# Patient Record
Sex: MALE | Race: BLACK OR AFRICAN AMERICAN
[De-identification: names, ages, dates, MRNs, and addresses within clinical notes are randomized per-mention and may not be internally consistent; named-entity substitution may affect disease eponyms.]

---

## 2017-01-02 ENCOUNTER — HOSPITAL ENCOUNTER (INPATIENT)
Dept: HOSPITAL 72 - EMR | Age: 47
LOS: 23 days | Discharge: HOME | DRG: 710 | End: 2017-01-25
Payer: MEDICAID

## 2017-01-02 VITALS — DIASTOLIC BLOOD PRESSURE: 71 MMHG | SYSTOLIC BLOOD PRESSURE: 154 MMHG

## 2017-01-02 VITALS — WEIGHT: 200 LBS | BODY MASS INDEX: 26.51 KG/M2 | HEIGHT: 73 IN

## 2017-01-02 DIAGNOSIS — F11.90: ICD-10-CM

## 2017-01-02 DIAGNOSIS — F15.90: ICD-10-CM

## 2017-01-02 DIAGNOSIS — R65.20: ICD-10-CM

## 2017-01-02 DIAGNOSIS — N17.0: ICD-10-CM

## 2017-01-02 DIAGNOSIS — F12.90: ICD-10-CM

## 2017-01-02 DIAGNOSIS — Z72.89: ICD-10-CM

## 2017-01-02 DIAGNOSIS — L03.116: ICD-10-CM

## 2017-01-02 DIAGNOSIS — I27.2: ICD-10-CM

## 2017-01-02 DIAGNOSIS — N39.0: ICD-10-CM

## 2017-01-02 DIAGNOSIS — N18.9: ICD-10-CM

## 2017-01-02 DIAGNOSIS — I12.9: ICD-10-CM

## 2017-01-02 DIAGNOSIS — D64.9: ICD-10-CM

## 2017-01-02 DIAGNOSIS — Z59.0: ICD-10-CM

## 2017-01-02 DIAGNOSIS — E88.09: ICD-10-CM

## 2017-01-02 DIAGNOSIS — A41.9: Primary | ICD-10-CM

## 2017-01-02 LAB
ALBUMIN/GLOB SERPL: 1 {RATIO} (ref 1–2.7)
ALT SERPL-CCNC: 13 U/L (ref 3–41)
ANION GAP SERPL CALC-SCNC: 17 MMOL/L (ref 5–15)
AST SERPL-CCNC: 23 U/L (ref 5–40)
BASOPHILS NFR BLD MANUAL: 0 % (ref 0–2)
CALCIUM SERPL-MCNC: 8.5 MG/DL (ref 8.6–10.2)
CHLORIDE SERPL-SCNC: 91 MEQ/L (ref 98–107)
CO2 SERPL-SCNC: 23 MEQ/L (ref 20–30)
CREAT SERPL-MCNC: 1.2 MG/DL (ref 0.7–1.2)
EOSINOPHIL NFR BLD MANUAL: 0 % (ref 0–3)
ERYTHROCYTE [DISTWIDTH] IN BLOOD BY AUTOMATED COUNT: 11.7 % (ref 11.6–14.8)
GFR SERPLBLD BASED ON 1.73 SQ M-ARVRAT: > 60 ML/MIN (ref 60–?)
GLOBULIN SER-MCNC: 3.4 G/DL
HEMOLYSIS: 7
MCH RBC QN AUTO: 29.7 PG (ref 27–31)
MCHC RBC AUTO-ENTMCNC: 32 G/DL (ref 32–36)
MCV RBC AUTO: 93 FL (ref 80–99)
NEUTS BAND NFR BLD MANUAL: 0 % (ref 0–8)
NEUTS BAND NFR BLD MANUAL: 92 % (ref 45–75)
PLAT MORPH BLD: NORMAL
PLATELET # BLD EST: ADEQUATE 10*3/UL
PLATELET # BLD: 211 K/UL (ref 150–450)
PMV BLD AUTO: 5.3 FL (ref 6.5–10.1)
POTASSIUM SERPL-SCNC: 3.5 MEQ/L (ref 3.4–4.9)
PROT SERPL-MCNC: 6.8 G/DL (ref 6.6–8.7)
RBC # BLD AUTO: 4.21 M/UL (ref 4.7–6.1)
RBC MORPH BLD: NORMAL
SODIUM SERPL-SCNC: 131 MEQ/L (ref 135–145)
TOTAL CELLS COUNTED BLD: 100
TROPONIN I SERPL-MCNC: < 0.3 NG/ML (ref ?–0.3)
VARIANT LYMPHS NFR BLD MANUAL: 7 % (ref 20–45)
WBC # BLD AUTO: 21.8 K/UL (ref 4.8–10.8)

## 2017-01-02 PROCEDURE — 76775 US EXAM ABDO BACK WALL LIM: CPT

## 2017-01-02 PROCEDURE — 36589 REMOVAL TUNNELED CV CATH: CPT

## 2017-01-02 PROCEDURE — 80150 ASSAY OF AMIKACIN: CPT

## 2017-01-02 PROCEDURE — 85025 COMPLETE CBC W/AUTO DIFF WBC: CPT

## 2017-01-02 PROCEDURE — 84300 ASSAY OF URINE SODIUM: CPT

## 2017-01-02 PROCEDURE — 84481 FREE ASSAY (FT-3): CPT

## 2017-01-02 PROCEDURE — 86705 HEP B CORE ANTIBODY IGM: CPT

## 2017-01-02 PROCEDURE — 85730 THROMBOPLASTIN TIME PARTIAL: CPT

## 2017-01-02 PROCEDURE — 80061 LIPID PANEL: CPT

## 2017-01-02 PROCEDURE — 87340 HEPATITIS B SURFACE AG IA: CPT

## 2017-01-02 PROCEDURE — 84484 ASSAY OF TROPONIN QUANT: CPT

## 2017-01-02 PROCEDURE — 82550 ASSAY OF CK (CPK): CPT

## 2017-01-02 PROCEDURE — 82728 ASSAY OF FERRITIN: CPT

## 2017-01-02 PROCEDURE — 86709 HEPATITIS A IGM ANTIBODY: CPT

## 2017-01-02 PROCEDURE — 93970 EXTREMITY STUDY: CPT

## 2017-01-02 PROCEDURE — 86803 HEPATITIS C AB TEST: CPT

## 2017-01-02 PROCEDURE — 87040 BLOOD CULTURE FOR BACTERIA: CPT

## 2017-01-02 PROCEDURE — 36569 INSJ PICC 5 YR+ W/O IMAGING: CPT

## 2017-01-02 PROCEDURE — 71010: CPT

## 2017-01-02 PROCEDURE — 83880 ASSAY OF NATRIURETIC PEPTIDE: CPT

## 2017-01-02 PROCEDURE — 85007 BL SMEAR W/DIFF WBC COUNT: CPT

## 2017-01-02 PROCEDURE — 84443 ASSAY THYROID STIM HORMONE: CPT

## 2017-01-02 PROCEDURE — 81050 URINALYSIS VOLUME MEASURE: CPT

## 2017-01-02 PROCEDURE — 86140 C-REACTIVE PROTEIN: CPT

## 2017-01-02 PROCEDURE — 84550 ASSAY OF BLOOD/URIC ACID: CPT

## 2017-01-02 PROCEDURE — 84100 ASSAY OF PHOSPHORUS: CPT

## 2017-01-02 PROCEDURE — 83930 ASSAY OF BLOOD OSMOLALITY: CPT

## 2017-01-02 PROCEDURE — 83550 IRON BINDING TEST: CPT

## 2017-01-02 PROCEDURE — 89050 BODY FLUID CELL COUNT: CPT

## 2017-01-02 PROCEDURE — 82607 VITAMIN B-12: CPT

## 2017-01-02 PROCEDURE — 84439 ASSAY OF FREE THYROXINE: CPT

## 2017-01-02 PROCEDURE — 76000 FLUOROSCOPY <1 HR PHYS/QHP: CPT

## 2017-01-02 PROCEDURE — 81001 URINALYSIS AUTO W/SCOPE: CPT

## 2017-01-02 PROCEDURE — 82746 ASSAY OF FOLIC ACID SERUM: CPT

## 2017-01-02 PROCEDURE — 83605 ASSAY OF LACTIC ACID: CPT

## 2017-01-02 PROCEDURE — 75827 VEIN X-RAY CHEST: CPT

## 2017-01-02 PROCEDURE — 82533 TOTAL CORTISOL: CPT

## 2017-01-02 PROCEDURE — 83735 ASSAY OF MAGNESIUM: CPT

## 2017-01-02 PROCEDURE — 83540 ASSAY OF IRON: CPT

## 2017-01-02 PROCEDURE — 85610 PROTHROMBIN TIME: CPT

## 2017-01-02 PROCEDURE — 84156 ASSAY OF PROTEIN URINE: CPT

## 2017-01-02 PROCEDURE — 76937 US GUIDE VASCULAR ACCESS: CPT

## 2017-01-02 PROCEDURE — 83036 HEMOGLOBIN GLYCOSYLATED A1C: CPT

## 2017-01-02 PROCEDURE — 80053 COMPREHEN METABOLIC PANEL: CPT

## 2017-01-02 PROCEDURE — 82977 ASSAY OF GGT: CPT

## 2017-01-02 PROCEDURE — 94760 N-INVAS EAR/PLS OXIMETRY 1: CPT

## 2017-01-02 PROCEDURE — 87086 URINE CULTURE/COLONY COUNT: CPT

## 2017-01-02 PROCEDURE — 36415 COLL VENOUS BLD VENIPUNCTURE: CPT

## 2017-01-02 PROCEDURE — 93306 TTE W/DOPPLER COMPLETE: CPT

## 2017-01-02 PROCEDURE — 80202 ASSAY OF VANCOMYCIN: CPT

## 2017-01-02 PROCEDURE — 80300: CPT

## 2017-01-02 PROCEDURE — 94664 DEMO&/EVAL PT USE INHALER: CPT

## 2017-01-02 PROCEDURE — 80048 BASIC METABOLIC PNL TOTAL CA: CPT

## 2017-01-02 SDOH — ECONOMIC STABILITY - HOUSING INSECURITY: HOMELESSNESS: Z59.0

## 2017-01-02 NOTE — EMERGENCY ROOM REPORT
History of Present Illness


General


Chief Complaint:  Pain


Source:  Patient





Present Illness


HPI


Patient is a 46-year-old male who presented after increased left leg pain and 

swelling.  The patient had noted to have increased fever.  He had been having 

increased redness and swelling to his left leg.  A gradual onset of symptoms.  

Patient states that he thought he had cellulitis.  The patient had not been 

vomiting.  He reported having pain with movement.


Allergies:  


Coded Allergies:  


     No Known Allergies (Unverified , 1/2/17)





Patient History


Past Medical History:  see triage record


Reviewed Nursing Documentation:  PMH: Agreed, PSxH: Agreed





Nursing Documentation-PMH


Past Medical History:  No Stated History





Review of Systems


All Other Systems:  negative except mentioned in HPI





Physical Exam





Vital Signs








  Date Time  Temp Pulse Resp B/P Pulse Ox O2 Delivery O2 Flow Rate FiO2


 


1/2/17 20:52 102.7 76 18 154/71 98 Room Air  








Sp02 EP Interpretation:  reviewed, normal


General Appearance:  normal inspection, well appearing, no apparent distress, 

alert, GCS 15, Chronically Ill


Head:  atraumatic


ENT:  normal ENT inspection, hearing grossly normal, normal voice


Neck:  normal inspection, full range of motion, supple, no bony tend


Respiratory:  normal inspection, lungs clear, normal breath sounds, no 

respiratory distress, no retraction, no wheezing


Cardiovascular #1:  regular rate, rhythm, no edema


Gastrointestinal:  normal inspection, normal bowel sounds, non tender, soft, no 

guarding, no hernia


Genitourinary:  no CVA tenderness


Musculoskeletal:  normal inspection, back normal, normal range of motion


Neurologic:  normal inspection, alert, oriented x3, responsive, CNs III-XII nml 

as tested, speech normal


Psychiatric:  normal inspection, judgement/insight normal, mood/affect normal


Skin:  rash - erythema warmth, swelling to left leg consistent with cellulitis.

      Right Leg with swelling without evident infection





Medical Decision Making


Diagnostic Impression:  


 Primary Impression:  


 Cellulitis


ER Course


Patient presented for extremity pain. Differential diagnosis included but was 

not limited to fracture, contusion, vascular insufficiency, aortic aneurysm, 

cellulitis.Because of complexity of patient's case laboratory testing and 

imaging studies were ordered.


The patient was noted to have fever and tenderness to his left leg.  This is 

consistent with cellulitis.  Laboratory testing was notable for elevated white 

blood count.  The patient was given IV antibiotics.  The patient was discussed 

with Dr. peters for inpatient management





Labs








Test


  1/2/17


22:30


 


White Blood Count


  21.8 K/UL


(4.8-10.8)


 


Red Blood Count


  4.21 M/UL


(4.70-6.10)


 


Hemoglobin


  12.5 G/DL


(14.2-18.0)


 


Hematocrit


  39.1 %


(42.0-52.0)


 


Mean Corpuscular Volume 93 FL (80-99) 


 


Mean Corpuscular Hemoglobin


  29.7 PG


(27.0-31.0)


 


Mean Corpuscular Hemoglobin


Concent 32.0 G/DL


(32.0-36.0)


 


Red Cell Distribution Width


  11.7 %


(11.6-14.8)


 


Platelet Count


  211 K/UL


(150-450)


 


Mean Platelet Volume


  5.3 FL


(6.5-10.1)


 


Neutrophils (%) (Auto)  % (45.0-75.0) 


 


Lymphocytes (%) (Auto)  % (20.0-45.0) 


 


Monocytes (%) (Auto)  % (1.0-10.0) 


 


Eosinophils (%) (Auto)  % (0.0-3.0) 


 


Basophils (%) (Auto)  % (0.0-2.0) 


 


Lactic Acid Level


  1.60 mmol/L


(0.66-2.22)


 


Troponin I


  < 0.30 ng/mL


(<=0.30)











Last Vital Signs








  Date Time  Temp Pulse Resp B/P Pulse Ox O2 Delivery O2 Flow Rate FiO2


 


1/2/17 21:32 102.7 79 18 154/71 98 Room Air  








Status:  unchanged


Disposition:  ADMITTED AS INPATIENT


Condition:  Serious


Referrals:  


NOT CHOSEN [facility]/MD,REFERRING (PCP)











Fabian Romero Jan 2, 2017 23:44

## 2017-01-03 VITALS — DIASTOLIC BLOOD PRESSURE: 54 MMHG | SYSTOLIC BLOOD PRESSURE: 110 MMHG

## 2017-01-03 VITALS — SYSTOLIC BLOOD PRESSURE: 105 MMHG | DIASTOLIC BLOOD PRESSURE: 64 MMHG

## 2017-01-03 VITALS — SYSTOLIC BLOOD PRESSURE: 129 MMHG | DIASTOLIC BLOOD PRESSURE: 65 MMHG

## 2017-01-03 VITALS — SYSTOLIC BLOOD PRESSURE: 90 MMHG | DIASTOLIC BLOOD PRESSURE: 69 MMHG

## 2017-01-03 VITALS — SYSTOLIC BLOOD PRESSURE: 91 MMHG | DIASTOLIC BLOOD PRESSURE: 55 MMHG

## 2017-01-03 VITALS — DIASTOLIC BLOOD PRESSURE: 62 MMHG | SYSTOLIC BLOOD PRESSURE: 117 MMHG

## 2017-01-03 LAB
%HYPO/RBC NFR BLD AUTO: (no result) %
ALBUMIN/GLOB SERPL: 0.8 {RATIO} (ref 1–2.7)
ALT SERPL-CCNC: 11 U/L (ref 3–41)
ANION GAP SERPL CALC-SCNC: 17 MMOL/L (ref 5–15)
AST SERPL-CCNC: 23 U/L (ref 5–40)
BASOPHILS NFR BLD MANUAL: 0 % (ref 0–2)
CALCIUM SERPL-MCNC: 8.2 MG/DL (ref 8.6–10.2)
CHLORIDE SERPL-SCNC: 94 MEQ/L (ref 98–107)
CHOLEST SERPL-MCNC: 88 MG/DL (ref ?–200)
CHOLEST/HDLC SERPL: 1.8 {RATIO} (ref 3.3–4.4)
CO2 SERPL-SCNC: 23 MEQ/L (ref 20–30)
CREAT SERPL-MCNC: 1.4 MG/DL (ref 0.7–1.2)
EOSINOPHIL NFR BLD MANUAL: 0 % (ref 0–3)
ERYTHROCYTE [DISTWIDTH] IN BLOOD BY AUTOMATED COUNT: 11.8 % (ref 11.6–14.8)
GFR SERPLBLD BASED ON 1.73 SQ M-ARVRAT: > 60 ML/MIN (ref 60–?)
GLOBULIN SER-MCNC: 3.4 G/DL
HBA1C MFR BLD: 5.6 % (ref ?–6)
HEMOLYSIS: 39
LDLC SERPL ELPH-MCNC: 25 MG/DL (ref 60–99)
MCH RBC QN AUTO: 29.2 PG (ref 27–31)
MCHC RBC AUTO-ENTMCNC: 31.4 G/DL (ref 32–36)
MCV RBC AUTO: 93 FL (ref 80–99)
NEUTS BAND NFR BLD MANUAL: 3 % (ref 0–8)
NEUTS BAND NFR BLD MANUAL: 79 % (ref 45–75)
PLAT MORPH BLD: NORMAL
PLATELET # BLD EST: ADEQUATE 10*3/UL
PLATELET # BLD: 236 K/UL (ref 150–450)
PMV BLD AUTO: 5.6 FL (ref 6.5–10.1)
POTASSIUM SERPL-SCNC: 4.1 MEQ/L (ref 3.4–4.9)
PROT SERPL-MCNC: 6.2 G/DL (ref 6.6–8.7)
RBC # BLD AUTO: 4.46 M/UL (ref 4.7–6.1)
SODIUM SERPL-SCNC: 134 MEQ/L (ref 135–145)
TOTAL CELLS COUNTED BLD: 100
TSH SERPL-ACNC: 0.25 UIU/ML (ref 0.3–4.5)
VARIANT LYMPHS NFR BLD MANUAL: 13 % (ref 20–45)
WBC # BLD AUTO: 16.6 K/UL (ref 4.8–10.8)

## 2017-01-03 RX ADMIN — MORPHINE SULFATE PRN MG: 4 INJECTION INTRAVENOUS at 16:13

## 2017-01-03 RX ADMIN — HUMAN INSULIN SCH MLS/HR: 100 INJECTION, SOLUTION SUBCUTANEOUS at 18:37

## 2017-01-03 RX ADMIN — SODIUM CHLORIDE SCH MLS/HR: 9 INJECTION, SOLUTION INTRAVENOUS at 16:44

## 2017-01-03 RX ADMIN — HEPARIN SODIUM SCH UNITS: 5000 INJECTION INTRAVENOUS; SUBCUTANEOUS at 21:22

## 2017-01-03 RX ADMIN — HEPARIN SODIUM SCH UNITS: 5000 INJECTION INTRAVENOUS; SUBCUTANEOUS at 08:22

## 2017-01-03 NOTE — CONSULTATION
DATE OF CONSULTATION:



INFECTIOUS DISEASE CONSULTATION



CONSULTING PHYSICIAN:  Shailesh Bell M.D.



REFERRING PHYSICIAN:  Benjamin Salazar M.D.



REASON FOR CONSULTATION:  Left lower extremity cellulitis and

antibiotic management.



HISTORY OF PRESENT ILLNESS:  The patient is a 46-year-old male with

admitting prior past medical history of anemia who came to the hospital

with chief complaint of left lower extremity swelling and erythema.  In

the last few days that has worsened.  The patient is febrile.  Infectious

consultation requested for further evaluation of the patient and

antibiotic management.



PAST MEDICAL HISTORY:  Anemia.



MEDICATIONS:  Vancomycin and cefepime.



ALLERGIES:  No known drug allergies.



SOCIAL HISTORY:  The patient smokes cigarettes and drinks alcohol

occasionally.  The patient also occasionally uses __________.



FAMILY HISTORY:  Noncontributory.



PHYSICAL EXAMINATION:

VITAL SIGNS:  Temperature 100.4 degrees, pulse 86, respiratory rate

18, and blood pressure 105/64.

HEENT:  Mild pale conjunctivae.  No icterus.

NECK:  No lymphadenopathy.

CHEST:  Coarse breathing sounds.

HEART:  S1 and S2.

ABDOMEN:  Soft.

EXTREMITIES:  The patient has erythema over the left leg that is

tender.  The patient has left lower extremity edema.



LABORATORY AND DIAGNOSTIC DATA:  White blood cells 16.6, hemoglobin

13, and platelets 236,000.  BUN 15 and creatinine 1.4.  ALT, AST and

alkaline phosphatase within normal range.



ASSESSMENT AND PLAN:  The patient is a 46-year-old male with past

medical history significant for anemia, who came with left lower extremity

cellulitis most likely strep.  The patient is afebrile with __________ of

possibility of bacteremia.  The patient also has leukocytosis.  At this

time, the patient may benefit from broad-spectrum antibiotics.  We get

further information from cultures.



PLAN:

1. We will continue the patient on vancomycin and cefepime.

2. Monitor CBC.

3. Monitor BMP.

4. Monitor blood culture.

5. Monitor laboratories.

6. Based on the patient's clinical course, laboratories and cultures, we

will do further recommendation.



Thank you, Dr. Salazar, for allowing me to participate in the care of

this patient.  I will follow the patient with you during this

hospitalization









  ______________________________________________

  Shailesh Bell M.D.





DR:  Edwardo

D:  01/03/2017 11:47

T:  01/03/2017 12:44

JOB#:  [id number]

CC:

## 2017-01-03 NOTE — HISTORY AND PHYSICAL
History of Present Illness


General


Date patient seen:  Delbert 3, 2017


Reason for Hospitalization:  Pain





Present Illness


HPI


 46-year-old male who presented after increased left leg pain and swelling.  

The patient had noted to have fever.  He had been having increased redness and 

swelling to his left leg.  He was diagnosed to have extensive cellulitis and 

admitted for further evaluation.


Allergies:  


Coded Allergies:  


     No Known Allergies (Unverified , 1/2/17)





Patient History


Healthcare decision maker





Resuscitation status


Full Code


Advanced Directive on File


No





Past Medical/Surgical History


Past Medical/Surgical History:  


(1) Cellulitis





Review of Systems


All Other Systems:  negative except mentioned in HPI





Physical Exam


Lines, tubes and drains:  peripheral, central line


HEENT:  normocephalic, atraumatic


Neck:  non-tender, normal alignment


Respiratory/Chest:  chest wall non-tender, lungs clear


Cardiovascular/Chest:  normal peripheral pulses, normal rate


Abdomen:  normal bowel sounds, non tender


Genitourinary/Rectal:  normal genital exam, normal rectal exam





Last 24 Hour Vital Signs








  Date Time  Temp Pulse Resp B/P Pulse Ox O2 Delivery O2 Flow Rate FiO2


 


1/3/17 12:00 101.7 109 22 110/54 97 Room Air  


 


1/3/17 11:42 101.7       


 


1/3/17 10:38 100.4       


 


1/3/17 08:00 98.4 100 20 105/64 100 Room Air  


 


1/3/17 04:00 99.0 89 18 129/65 100 Room Air  


 


1/3/17 01:40 99.3 87 18 90/52 97 Room Air  


 


1/3/17 01:00  83 16 138/98 100 Room Air  


 


1/2/17 21:32 102.7 79 18 154/71 98 Room Air  


 


1/2/17 20:52 102.7 76 18 154/71 98 Room Air  

















Intake and Output  


 


 1/2/17 1/3/17





 18:59 06:59


 


Intake Total  400 ml


 


Balance  400 ml


 


  


 


Intake Oral  0 ml


 


IV Total  400 ml


 


# Voids  1











Laboratory Tests








Test


  1/2/17


22:30 1/3/17


05:25


 


White Blood Count


  21.8 K/UL


(4.8-10.8)  H 16.6 K/UL


(4.8-10.8)  H


 


Red Blood Count


  4.21 M/UL


(4.70-6.10)  L 4.46 M/UL


(4.70-6.10)  L


 


Hemoglobin


  12.5 G/DL


(14.2-18.0)  L 13.0 G/DL


(14.2-18.0)  L


 


Hematocrit


  39.1 %


(42.0-52.0)  L 41.4 %


(42.0-52.0)  L


 


Mean Corpuscular Volume 93 FL (80-99)   93 FL (80-99)  


 


Mean Corpuscular Hemoglobin


  29.7 PG


(27.0-31.0) 29.2 PG


(27.0-31.0)


 


Mean Corpuscular Hemoglobin


Concent 32.0 G/DL


(32.0-36.0) 31.4 G/DL


(32.0-36.0)  L


 


Red Cell Distribution Width


  11.7 %


(11.6-14.8) 11.8 %


(11.6-14.8)


 


Platelet Count


  211 K/UL


(150-450) 236 K/UL


(150-450)


 


Mean Platelet Volume


  5.3 FL


(6.5-10.1)  L 5.6 FL


(6.5-10.1)  L


 


Neutrophils (%) (Auto)


  % (45.0-75.0)


  % (45.0-75.0)


 


 


Lymphocytes (%) (Auto)


  % (20.0-45.0)


  % (20.0-45.0)


 


 


Monocytes (%) (Auto)  % (1.0-10.0)    % (1.0-10.0)  


 


Eosinophils (%) (Auto)  % (0.0-3.0)    % (0.0-3.0)  


 


Basophils (%) (Auto)  % (0.0-2.0)    % (0.0-2.0)  


 


Differential Total Cells


Counted 100  


  100  


 


 


Neutrophils % (Manual) 92 % (45-75)  H 79 % (45-75)  H


 


Lymphocytes % (Manual) 7 % (20-45)  L 13 % (20-45)  L


 


Monocytes % (Manual) 1 % (1-10)   5 % (1-10)  


 


Eosinophils % (Manual) 0 % (0-3)   0 % (0-3)  


 


Basophils % (Manual) 0 % (0-2)   0 % (0-2)  


 


Band Neutrophils 0 % (0-8)   3 % (0-8)  


 


Platelet Estimate Adequate   Adequate  


 


Platelet Morphology Normal   Normal  


 


Red Blood Cell Morphology Normal   


 


Sodium Level


  131 mEQ/L


(135-145)  L 134 mEQ/L


(135-145)  L


 


Potassium Level


  3.5 mEQ/L


(3.4-4.9) 4.1 mEQ/L


(3.4-4.9)


 


Chloride Level


  91 mEQ/L


()  L 94 mEQ/L


()  L


 


Carbon Dioxide Level


  23 mEQ/L


(20-30) 23 mEQ/L


(20-30)


 


Anion Gap 17 (5-15)  H 17 (5-15)  H


 


Blood Urea Nitrogen


  13 mg/dL


(7-23) 15 mg/dL


(7-23)


 


Creatinine


  1.2 mg/dL


(0.7-1.2) 1.4 mg/dL


(0.7-1.2)  H


 


Estimat Glomerular Filtration


Rate > 60 mL/min


(>60) > 60 mL/min


(>60)


 


Glucose Level


  114 mg/dL


()  H 108 mg/dL


()  H


 


Lactic Acid Level


  1.60 mmol/L


(0.66-2.22) 


 


 


Calcium Level


  8.5 mg/dL


(8.6-10.2)  L 8.2 mg/dL


(8.6-10.2)  L


 


Total Bilirubin


  0.6 mg/dL


(0.0-1.2) 0.7 mg/dL


(0.0-1.2)


 


Aspartate Amino Transf


(AST/SGOT) 23 U/L (5-40)  


  23 U/L (5-40)  


 


 


Alanine Aminotransferase


(ALT/SGPT) 13 U/L (3-41)  


  11 U/L (3-41)  


 


 


Alkaline Phosphatase


  58 U/L


() 56 U/L


()


 


Troponin I


  < 0.30 ng/mL


(<=0.30) 


 


 


Total Protein


  6.8 g/dL


(6.6-8.7) 6.2 g/dL


(6.6-8.7)  L


 


Albumin


  3.4 g/dL


(3.5-5.2)  L 2.8 g/dL


(3.5-5.2)  L


 


Globulin 3.4 g/dL   3.4 g/dL  


 


Albumin/Globulin Ratio


  1.0 (1.0-2.7)  


  0.8 (1.0-2.7)


L


 


Hypochromasia  1+  


 


Hemoglobin A1c  5.6 % (< 6.0)  


 


Triglycerides Level


  


  65 mg/dL (<


150)


 


Cholesterol Level


  


  88 mg/dL (<


200)


 


LDL Cholesterol


  


  25 mg/dL


(60-99)  L


 


HDL Cholesterol


  


  50 mg/dL (>


60)


 


Cholesterol/HDL Ratio


  


  1.8 (3.3-4.4)


L


 


Thyroid Stimulating Hormone


(TSH) 


  0.248 uIU/mL


(0.300-4.500)








Height (Feet):  6


Height (Inches):  1.00


Weight (Pounds):  200


Medications





Current Medications








 Medications


  (Trade)  Dose


 Ordered  Sig/Kathleen


 Route


 PRN Reason  Start Time


 Stop Time Status Last Admin


Dose Admin


 


 Acetaminophen


  (Tylenol)  650 mg  Q4H  PRN


 ORAL


 fever  1/2/17 23:45


 2/1/17 23:44  1/3/17 10:43


 


 


 Al Hydroxide/Mg


 Hydroxide


  (Mylanta II)  30 ml  Q6H  PRN


 ORAL


 dyspepsia  1/2/17 23:45


 2/1/17 23:44   


 


 


 Cefepime HCl 1 gm/


 Sodium Chloride  100 ml @ 


 200 mls/hr  EVERY 8  HOURS


 IVPB


   1/3/17 14:00


 1/10/17 13:59  1/3/17 13:23


 


 


 Dextrose


  (Dextrose 50%)    STAT  PRN


 IV


 Hypoglycemia  1/2/17 23:45


 2/1/17 23:44   


 


 


 Heparin Sodium


  (Porcine)


  (Heparin 5000


 units/ml)  5,000 units  EVERY 12  HOURS


 SUBQ


   1/3/17 09:00


 2/2/17 08:59  1/3/17 08:22


 


 


 Lorazepam


  (Ativan 2mg/ml


 1ml)  0.5 mg  Q4H  PRN


 IV


 For Anxiety  1/2/17 23:45


 1/9/17 23:44   


 


 


 Morphine Sulfate


  (Morphine


 Sulfate)  1 mg  EVERY 4 HOURS  PRN


 IVP


 For Pain  1/2/17 23:45


 1/9/17 23:44   


 


 


 Ondansetron HCl


  (Zofran)  4 mg  Q6H  PRN


 IVP


 Nausea & Vomiting  1/2/17 23:45


 2/1/17 23:44   


 


 


 Polyethylene


 Glycol


  (Miralax)  17 gm  HSPRN  PRN


 ORAL


 Constipation  1/2/17 23:45


 2/1/17 23:44   


 


 


 Vancomycin HCl 1


 ea  1 ea  DAILY  PRN


 MISC


 Per rx protocol  1/2/17 23:45


 2/1/17 23:44   


 


 


 Vancomycin HCl/


 Dextrose


  (Vancomycin/D5W


 250ml)  250 ml @ 


 167 mls/hr  Q12HR@0400,1600


 IVPB


   1/3/17 16:00


 1/8/17 15:59   


 


 


 Zolpidem Tartrate


  (Ambien)  5 mg  HSPRN  PRN


 ORAL


 Insomnia  1/2/17 23:45


 2/1/17 23:44   


 











Assessment/Plan


Problem List:  


(1) Sepsis


ICD Codes:  A41.9 - Sepsis, unspecified organism


SNOMED:  68976489


(2) Cellulitis


ICD Codes:  L03.90 - Cellulitis, unspecified


SNOMED:  584241879


Qualifiers:  


   Qualified Codes:  L03.116 - Cellulitis of left lower limb


Assessment/Plan


IV antibiotics


f/u wbc


morphine for pain











GE WIN Delbert 3, 2017 15:57

## 2017-01-04 VITALS — DIASTOLIC BLOOD PRESSURE: 61 MMHG | SYSTOLIC BLOOD PRESSURE: 109 MMHG

## 2017-01-04 VITALS — DIASTOLIC BLOOD PRESSURE: 63 MMHG | SYSTOLIC BLOOD PRESSURE: 111 MMHG

## 2017-01-04 VITALS — DIASTOLIC BLOOD PRESSURE: 57 MMHG | SYSTOLIC BLOOD PRESSURE: 121 MMHG

## 2017-01-04 VITALS — SYSTOLIC BLOOD PRESSURE: 118 MMHG | DIASTOLIC BLOOD PRESSURE: 72 MMHG

## 2017-01-04 VITALS — DIASTOLIC BLOOD PRESSURE: 64 MMHG | SYSTOLIC BLOOD PRESSURE: 116 MMHG

## 2017-01-04 VITALS — DIASTOLIC BLOOD PRESSURE: 68 MMHG | SYSTOLIC BLOOD PRESSURE: 130 MMHG

## 2017-01-04 RX ADMIN — HEPARIN SODIUM SCH UNITS: 5000 INJECTION INTRAVENOUS; SUBCUTANEOUS at 20:56

## 2017-01-04 RX ADMIN — HEPARIN SODIUM SCH UNITS: 5000 INJECTION INTRAVENOUS; SUBCUTANEOUS at 08:15

## 2017-01-04 RX ADMIN — HUMAN INSULIN SCH MLS/HR: 100 INJECTION, SOLUTION SUBCUTANEOUS at 05:00

## 2017-01-04 RX ADMIN — SODIUM CHLORIDE SCH MLS/HR: 9 INJECTION, SOLUTION INTRAVENOUS at 03:38

## 2017-01-04 RX ADMIN — MORPHINE SULFATE PRN MG: 4 INJECTION INTRAVENOUS at 08:14

## 2017-01-04 RX ADMIN — SODIUM CHLORIDE SCH MLS/HR: 9 INJECTION, SOLUTION INTRAVENOUS at 16:24

## 2017-01-04 RX ADMIN — HUMAN INSULIN SCH MLS/HR: 100 INJECTION, SOLUTION SUBCUTANEOUS at 13:59

## 2017-01-04 RX ADMIN — MORPHINE SULFATE PRN MG: 4 INJECTION INTRAVENOUS at 17:33

## 2017-01-04 NOTE — PULMONOLOGY PROGRESS NOTE
Assessment/Plan


Problems:  


(1) Sepsis


(2) Cellulitis


Assessment/Plan


continue antibiotics


surgical evaluation


check cultures





Subjective


ROS Limited/Unobtainable:  No


Interval Events:  less pain, still febrile


Allergies:  


Coded Allergies:  


     No Known Allergies (Unverified , 1/2/17)





Objective





Last 24 Hour Vital Signs








  Date Time  Temp Pulse Resp B/P Pulse Ox O2 Delivery O2 Flow Rate FiO2


 


1/4/17 13:00 98.8 79 20 118/72 98 Room Air  


 


1/4/17 12:10 101.8 81 22 116/64 98 Room Air  


 


1/4/17 10:31 101.8       


 


1/4/17 08:00 100.2 96 22 121/57 98 Room Air  


 


1/4/17 04:00 99.1 87 20 111/63 97 Room Air  


 


1/4/17 00:00 100.9 96 20 109/61 99 Room Air  


 


1/3/17 19:31 102.6 111 17 117/62 97 Room Air  

















Intake and Output  


 


 1/3/17 1/4/17





 19:00 07:00


 


Intake Total 1040 ml 2194 ml


 


Output Total 600 ml 700 ml


 


Balance 440 ml 1494 ml


 


  


 


Intake Oral 790 ml 740 ml


 


IV Total 250 ml 1454 ml


 


Output Urine Total 600 ml 700 ml








General Appearance:  WD/WN


HEENT:  normocephalic


Respiratory/Chest:  chest wall non-tender, lungs clear


Cardiovascular:  normal peripheral pulses, normal rate


Abdomen:  normal bowel sounds


Skin:  rash


Musculoskeletal:  other - calf is less swollen





Microbiology








 Date/Time


Source Procedure


Growth Status


 


 


 1/2/17 22:35


Blood Blood Culture - Preliminary


NO GROWTH AFTER 24 HOURS Resulted


 


 1/2/17 22:30


Blood Blood Culture - Preliminary


NO GROWTH AFTER 24 HOURS Resulted








Laboratory Tests


1/4/17 09:50: Random Amikacin Level 3.0


1/4/17 15:00: Vancomycin Level Trough 10.2





Current Medications








 Medications


  (Trade)  Dose


 Ordered  Sig/Kathleen


 Route


 PRN Reason  Start Time


 Stop Time Status Last Admin


Dose Admin


 


 Acetaminophen


  (Tylenol)  650 mg  Q4H  PRN


 ORAL


 fever  1/2/17 23:45


 2/1/17 23:44  1/4/17 09:32


 


 


 Al Hydroxide/Mg


 Hydroxide


  (Mylanta II)  30 ml  Q6H  PRN


 ORAL


 dyspepsia  1/2/17 23:45


 2/1/17 23:44   


 


 


 Cefepime HCl 1 gm/


 Sodium Chloride  100 ml @ 


 200 mls/hr  EVERY 8  HOURS


 IVPB


   1/3/17 14:00


 1/10/17 13:59  1/4/17 13:59


 


 


 Dextrose


  (Dextrose 50%)    STAT  PRN


 IV


 Hypoglycemia  1/2/17 23:45


 2/1/17 23:44   


 


 


 Dextrose/Sodium


 Chloride


  (D5ns)  1,000 ml @ 


 100 mls/hr  Q10H


 IV


   1/3/17 19:00


 2/2/17 18:59  1/4/17 13:59


 


 


 Heparin Sodium


  (Porcine)


  (Heparin 5000


 units/ml)  5,000 units  EVERY 12  HOURS


 SUBQ


   1/3/17 09:00


 2/2/17 08:59  1/4/17 08:15


 


 


 Lorazepam


  (Ativan 2mg/ml


 1ml)  0.5 mg  Q4H  PRN


 IV


 For Anxiety  1/2/17 23:45


 1/9/17 23:44   


 


 


 Morphine Sulfate


 4 mg  4 mg  EVERY 4 HOURS  PRN


 IVP


 For Pain  1/3/17 16:00


 1/10/17 15:59  1/4/17 17:33


 


 


 Ondansetron HCl


  (Zofran)  4 mg  Q6H  PRN


 IVP


 Nausea & Vomiting  1/2/17 23:45


 2/1/17 23:44   


 


 


 Polyethylene


 Glycol


  (Miralax)  17 gm  HSPRN  PRN


 ORAL


 Constipation  1/2/17 23:45


 2/1/17 23:44   


 


 


 Vancomycin HCl 1


 ea  1 ea  DAILY  PRN


 MISC


 Per rx protocol  1/2/17 23:45


 2/1/17 23:44   


 


 


 Vancomycin HCl/


 Dextrose


  (Vancomycin/D5W


 250ml)  250 ml @ 


 167 mls/hr  Q12HR@0400,1600


 IVPB


   1/3/17 16:00


 1/8/17 15:59  1/4/17 16:24


 


 


 Zolpidem Tartrate


  (Ambien)  5 mg  HSPRN  PRN


 ORAL


 Insomnia  1/2/17 23:45


 2/1/17 23:44   


 

















GE WIN Jan 4, 2017 17:46

## 2017-01-04 NOTE — INFECTIOUS DISEASES PROG NOTE
Assessment/Plan


Assessment/Plan


A:








  The patient is a 46-year-old male with





Fever


leucocytosis 


Left lower extremity cellulitis 


   Doppler : NO DVT 


Ro bacteremia





Hx of Anemia.


smoker 














PLAN:





Cont  on vancomycin and cefepime # 3


Monitor CBC.


Monitor BMP.


Monitor blood culture.


Monitor laboratories.





Subjective


Allergies:  


Coded Allergies:  


     No Known Allergies (Unverified , 1/2/17)





Objective


Vital Signs





Last 24 Hour Vital Signs








  Date Time  Temp Pulse Resp B/P Pulse Ox O2 Delivery O2 Flow Rate FiO2


 


1/4/17 08:00 100.2 96 22 121/57 98 Room Air  


 


1/4/17 04:00 99.1 87 20 111/63 97 Room Air  


 


1/4/17 02:00 99.6       


 


1/4/17 00:00 100.9 96 20 109/61 99 Room Air  


 


1/3/17 19:31 102.6 111 17 117/62 97 Room Air  


 


1/3/17 15:55 99.9 44 17 91/55 90 Room Air  


 


1/3/17 12:00 101.7 109 22 110/54 97 Room Air  


 


1/3/17 10:38 100.4       








Height (Feet):  6


Height (Inches):  1.00


Weight (Pounds):  200





Microbiology








 Date/Time


Source Procedure


Growth Status


 


 


 1/2/17 22:35


Blood Blood Culture - Preliminary


NO GROWTH AFTER 24 HOURS Resulted


 


 1/2/17 22:30


Blood Blood Culture - Preliminary


NO GROWTH AFTER 24 HOURS Resulted











Current Medications








 Medications


  (Trade)  Dose


 Ordered  Sig/Kathleen


 Route


 PRN Reason  Start Time


 Stop Time Status Last Admin


Dose Admin


 


 Acetaminophen


  (Tylenol)  650 mg  Q4H  PRN


 ORAL


 fever  1/2/17 23:45


 2/1/17 23:44  1/4/17 01:00


 


 


 Al Hydroxide/Mg


 Hydroxide


  (Mylanta II)  30 ml  Q6H  PRN


 ORAL


 dyspepsia  1/2/17 23:45


 2/1/17 23:44   


 


 


 Amikacin Protocol


 1 ea  1 ea  DAILY  PRN


 MISC


 Per rx protocol  1/3/17 16:00


 2/2/17 15:59   


 


 


 Amikacin Sulfate


 1000 mg/Sodium


 Chloride  104 ml @ 


 200 mls/hr  Q24H


 IV


   1/3/17 18:00


 1/10/17 17:59  1/3/17 18:38


 


 


 Cefepime HCl 1 gm/


 Sodium Chloride  100 ml @ 


 200 mls/hr  EVERY 8  HOURS


 IVPB


   1/3/17 14:00


 1/10/17 13:59  1/4/17 05:00


 


 


 Dextrose


  (Dextrose 50%)    STAT  PRN


 IV


 Hypoglycemia  1/2/17 23:45


 2/1/17 23:44   


 


 


 Dextrose/Sodium


 Chloride


  (D5ns)  1,000 ml @ 


 100 mls/hr  Q10H


 IV


   1/3/17 19:00


 2/2/17 18:59  1/4/17 05:00


 


 


 Heparin Sodium


  (Porcine)


  (Heparin 5000


 units/ml)  5,000 units  EVERY 12  HOURS


 SUBQ


   1/3/17 09:00


 2/2/17 08:59  1/4/17 08:15


 


 


 Lorazepam


  (Ativan 2mg/ml


 1ml)  0.5 mg  Q4H  PRN


 IV


 For Anxiety  1/2/17 23:45


 1/9/17 23:44   


 


 


 Morphine Sulfate


  (Morphine


 Sulfate)  4 mg  EVERY 4 HOURS  PRN


 IVP


 For Pain  1/3/17 16:00


 1/10/17 15:59  1/4/17 08:14


 


 


 Ondansetron HCl


  (Zofran)  4 mg  Q6H  PRN


 IVP


 Nausea & Vomiting  1/2/17 23:45


 2/1/17 23:44   


 


 


 Polyethylene


 Glycol


  (Miralax)  17 gm  HSPRN  PRN


 ORAL


 Constipation  1/2/17 23:45


 2/1/17 23:44   


 


 


 Vancomycin HCl 1


 ea  1 ea  DAILY  PRN


 MISC


 Per rx protocol  1/2/17 23:45


 2/1/17 23:44   


 


 


 Vancomycin HCl/


 Dextrose


  (Vancomycin/D5W


 250ml)  250 ml @ 


 167 mls/hr  Q12HR@0400,1600


 IVPB


   1/3/17 16:00


 1/8/17 15:59  1/4/17 03:38


 


 


 Zolpidem Tartrate


  (Ambien)  5 mg  HSPRN  PRN


 ORAL


 Insomnia  1/2/17 23:45


 2/1/17 23:44   


 

















SKYLAR RICH M.D. Jan 4, 2017 08:58

## 2017-01-05 VITALS — DIASTOLIC BLOOD PRESSURE: 67 MMHG | SYSTOLIC BLOOD PRESSURE: 127 MMHG

## 2017-01-05 VITALS — DIASTOLIC BLOOD PRESSURE: 70 MMHG | SYSTOLIC BLOOD PRESSURE: 136 MMHG

## 2017-01-05 VITALS — SYSTOLIC BLOOD PRESSURE: 122 MMHG | DIASTOLIC BLOOD PRESSURE: 72 MMHG

## 2017-01-05 VITALS — DIASTOLIC BLOOD PRESSURE: 60 MMHG | SYSTOLIC BLOOD PRESSURE: 106 MMHG

## 2017-01-05 LAB
ALBUMIN/GLOB SERPL: 0.6 {RATIO} (ref 1–2.7)
ALBUMIN/GLOB SERPL: 0.6 {RATIO} (ref 1–2.7)
ALT SERPL-CCNC: 8 U/L (ref 3–41)
ALT SERPL-CCNC: 8 U/L (ref 3–41)
ANION GAP SERPL CALC-SCNC: 13 MMOL/L (ref 5–15)
ANION GAP SERPL CALC-SCNC: 9 MMOL/L (ref 5–15)
AST SERPL-CCNC: 14 U/L (ref 5–40)
AST SERPL-CCNC: 14 U/L (ref 5–40)
BASOPHILS NFR BLD AUTO: 0.3 % (ref 0–2)
CALCIUM SERPL-MCNC: 7.6 MG/DL (ref 8.6–10.2)
CALCIUM SERPL-MCNC: 7.8 MG/DL (ref 8.6–10.2)
CHLORIDE SERPL-SCNC: 102 MEQ/L (ref 98–107)
CHLORIDE SERPL-SCNC: 104 MEQ/L (ref 98–107)
CO2 SERPL-SCNC: 23 MEQ/L (ref 20–30)
CO2 SERPL-SCNC: 24 MEQ/L (ref 20–30)
CORTISOL: 16.4 UG/DL
CREAT SERPL-MCNC: 2.1 MG/DL (ref 0.7–1.2)
CREAT SERPL-MCNC: 2.5 MG/DL (ref 0.7–1.2)
EOSINOPHIL NFR BLD AUTO: 0.5 % (ref 0–3)
ERYTHROCYTE [DISTWIDTH] IN BLOOD BY AUTOMATED COUNT: 11.6 % (ref 11.6–14.8)
GFR SERPLBLD BASED ON 1.73 SQ M-ARVRAT: 33.8 ML/MIN (ref 60–?)
GFR SERPLBLD BASED ON 1.73 SQ M-ARVRAT: 41.5 ML/MIN (ref 60–?)
GLOBULIN SER-MCNC: 3.4 G/DL
GLOBULIN SER-MCNC: 3.4 G/DL
HEMOLYSIS: 0
HEMOLYSIS: 2
LYMPHOCYTES NFR BLD AUTO: 9.5 % (ref 20–45)
MAGNESIUM SERPL-MCNC: 1.7 MG/DL (ref 1.7–2.5)
MCH RBC QN AUTO: 30.6 PG (ref 27–31)
MCHC RBC AUTO-ENTMCNC: 32.9 G/DL (ref 32–36)
MCV RBC AUTO: 93 FL (ref 80–99)
MONOCYTES NFR BLD AUTO: 9.5 % (ref 1–10)
NEUTROPHILS NFR BLD AUTO: 80.2 % (ref 45–75)
PHOSPHATE SERPL-MCNC: 2.2 MG/DL (ref 2.5–4.8)
PLATELET # BLD: 162 K/UL (ref 150–450)
PMV BLD AUTO: 6 FL (ref 6.5–10.1)
POTASSIUM SERPL-SCNC: 3.6 MEQ/L (ref 3.4–4.9)
POTASSIUM SERPL-SCNC: 3.7 MEQ/L (ref 3.4–4.9)
PROT SERPL-MCNC: 5.6 G/DL (ref 6.6–8.7)
PROT SERPL-MCNC: 5.7 G/DL (ref 6.6–8.7)
RBC # BLD AUTO: 3.53 M/UL (ref 4.7–6.1)
SODIUM SERPL-SCNC: 137 MEQ/L (ref 135–145)
SODIUM SERPL-SCNC: 138 MEQ/L (ref 135–145)
T3FREE SERPL-MCNC: 2.1 PG/ML (ref 2.3–4.2)
TSH SERPL-ACNC: 0.95 UIU/ML (ref 0.3–4.5)
URATE SERPL-MCNC: 6.5 MG/DL (ref 3–7.5)
WBC # BLD AUTO: 12.2 K/UL (ref 4.8–10.8)

## 2017-01-05 PROCEDURE — B548ZZA ULTRASONOGRAPHY OF SUPERIOR VENA CAVA, GUIDANCE: ICD-10-PCS

## 2017-01-05 PROCEDURE — 02HV33Z INSERTION OF INFUSION DEVICE INTO SUPERIOR VENA CAVA, PERCUTANEOUS APPROACH: ICD-10-PCS

## 2017-01-05 RX ADMIN — HEPARIN SODIUM SCH UNITS: 5000 INJECTION INTRAVENOUS; SUBCUTANEOUS at 10:12

## 2017-01-05 RX ADMIN — DEXTROSE MONOHYDRATE SCH MLS/HR: 50 INJECTION, SOLUTION INTRAVENOUS at 21:00

## 2017-01-05 RX ADMIN — SODIUM CHLORIDE SCH MLS/HR: 9 INJECTION, SOLUTION INTRAVENOUS at 16:00

## 2017-01-05 RX ADMIN — DEXTROSE AND SODIUM CHLORIDE SCH MLS/HR: 5; .45 INJECTION, SOLUTION INTRAVENOUS at 22:19

## 2017-01-05 RX ADMIN — DEXTROSE AND SODIUM CHLORIDE SCH MLS/HR: 5; .45 INJECTION, SOLUTION INTRAVENOUS at 10:12

## 2017-01-05 RX ADMIN — HUMAN INSULIN SCH MLS/HR: 100 INJECTION, SOLUTION SUBCUTANEOUS at 03:08

## 2017-01-05 RX ADMIN — SODIUM CHLORIDE SCH MLS/HR: 9 INJECTION, SOLUTION INTRAVENOUS at 03:58

## 2017-01-05 RX ADMIN — HEPARIN SODIUM SCH UNITS: 5000 INJECTION INTRAVENOUS; SUBCUTANEOUS at 21:00

## 2017-01-05 RX ADMIN — DEXTROSE AND SODIUM CHLORIDE SCH MLS/HR: 5; .45 INJECTION, SOLUTION INTRAVENOUS at 16:25

## 2017-01-05 NOTE — DIAGNOSTIC IMAGING REPORT
Indications:  Pain, swelling, erythema left lower leg



Technique:  Coronal, sagittal, and axial T1 weighted and STIR sequences of the left

leg and ankle performed without IV gadolinium administration. Skin marker placed

over area of greatest clinical concern. No IV gadolinium administered due to

elevated creatinine level.



Findings:



Comparison:  None



The subcutaneous soft tissues of the left leg are diffusely edematous, most

prominent anteromedially. A circumscribed plaque-like area of fluid signal cyst atop

or between layers of the skin of the anteromedial distal leg, atop which the glide

skin marker sits. This measures approximately 4 x 4 cm in cross-sectional diameter

and 2 mm in thickness. No subcutaneous circumscribed mass or fluid collection

identified. There is suggestion of minimal edema within the intramuscular fascial

planes. No intra-muscular edema, mass or fluid collection identified. Tibia and

fibula demonstrate normal configuration and marrow signal characteristics. No

evidence of marrow edema or overlying cortical destruction.



IMPRESSION:



Soft tissue edema largely confined to the subcutaneous soft tissues of the leg 

as

described, with overlying blister. No evidence of associated abscess.



Questionable minimal edema within intramuscular fascial planes without overt

evidence of fasciitis or myositis



No evidence of underlying osteomyelitis

## 2017-01-05 NOTE — PULMONOLOGY PROGRESS NOTE
Assessment/Plan


Problems:  


(1) Sepsis


(2) Cellulitis


(3) ATN (acute tubular necrosis)


Assessment/Plan


continue antibiotics


surgical evaluation appreciated, 


MRI reviewed


pt doens't have and line, wanted to take a brake, will order a PICC line for am


Renal consult called


check cultures





Subjective


ROS Limited/Unobtainable:  No


Constitutional:  Reports: no symptoms


HEENT:  Repors: no symptoms


Respiratory:  Reports: no symptoms


Allergies:  


Coded Allergies:  


     No Known Allergies (Unverified , 1/2/17)





Objective





Last 24 Hour Vital Signs








  Date Time  Temp Pulse Resp B/P Pulse Ox O2 Delivery O2 Flow Rate FiO2


 


1/5/17 11:44 98.2 61 21 122/72 97 Room Air  


 


1/5/17 07:13 98.4       


 


1/5/17 04:00 99.5 79 18 127/67 96 Room Air  


 


1/4/17 21:07 99.1       


 


1/4/17 19:59 102.0 91 20 130/68 98 Room Air  

















Intake and Output  


 


 1/4/17 1/5/17





 19:00 07:00


 


Intake Total 1150 ml 1657 ml


 


Output Total  475 ml


 


Balance 1150 ml 1182 ml


 


  


 


Intake Oral  340 ml


 


IV Total 1150 ml 1317 ml


 


Output Urine Total  475 ml


 


# Voids 2 3








General Appearance:  WD/WN, no acute distress


Respiratory/Chest:  chest wall non-tender


Cardiovascular:  normal peripheral pulses, regular rhythm


Skin:  rash - improving


Neurologic/Psychiatric:  CNs II-XII grossly normal





Microbiology








 Date/Time


Source Procedure


Growth Status


 


 


 1/3/17 14:30


Blood Blood Culture - Preliminary


NO GROWTH AFTER 24 HOURS Resulted


 


 1/3/17 14:15


Blood Blood Culture - Preliminary


NO GROWTH AFTER 24 HOURS Resulted


 


 1/2/17 22:35


Blood Blood Culture - Preliminary


NO GROWTH AFTER 48 HOURS Resulted


 


 1/2/17 22:30


Blood Blood Culture - Preliminary


NO GROWTH AFTER 48 HOURS Resulted








Laboratory Tests


1/5/17 06:10: 


White Blood Count 12.2H, Red Blood Count 3.53L, Hemoglobin 10.8L, Hematocrit 

32.8L, Mean Corpuscular Volume 93, Mean Corpuscular Hemoglobin 30.6, Mean 

Corpuscular Hemoglobin Concent 32.9, Red Cell Distribution Width 11.6, Platelet 

Count 162, Mean Platelet Volume 6.0L, Neutrophils (%) (Auto) 80.2H, Lymphocytes 

(%) (Auto) 9.5L, Monocytes (%) (Auto) 9.5, Eosinophils (%) (Auto) 0.5, 

Basophils (%) (Auto) 0.3, Sodium Level 137, Potassium Level 3.7, Chloride Level 

104, Carbon Dioxide Level 24, Anion Gap 9, Blood Urea Nitrogen 22, Creatinine 

2.1H, Estimat Glomerular Filtration Rate 41.5, Glucose Level 110H, Calcium 

Level 7.6L, Total Bilirubin 0.3, Aspartate Amino Transf (AST/SGOT) 14, Alanine 

Aminotransferase (ALT/SGPT) 8, Alkaline Phosphatase 68, Total Creatine Kinase 70

, Total Protein 5.6L, Albumin 2.2L, Globulin 3.4, Albumin/Globulin Ratio 0.6L





Current Medications








 Medications


  (Trade)  Dose


 Ordered  Sig/Kathleen


 Route


 PRN Reason  Start Time


 Stop Time Status Last Admin


Dose Admin


 


 Acetaminophen


  (Tylenol)  650 mg  Q4H  PRN


 ORAL


 fever  1/2/17 23:45


 2/1/17 23:44  1/5/17 16:26


 


 


 Al Hydroxide/Mg


 Hydroxide


  (Mylanta II)  30 ml  Q6H  PRN


 ORAL


 dyspepsia  1/2/17 23:45


 2/1/17 23:44   


 


 


 Cefepime HCl 1 gm/


 Sodium Chloride  100 ml @ 


 200 mls/hr  EVERY 8  HOURS


 IVPB


   1/3/17 14:00


 1/10/17 13:59  1/5/17 05:28


 


 


 Dextrose


  (Dextrose 50%)    STAT  PRN


 IV


 Hypoglycemia  1/2/17 23:45


 2/1/17 23:44   


 


 


 Dextrose/Sodium


 Chloride


  (D5 0.45% NS)  1,000 ml @ 


 150 mls/hr  Q6H40M


 IV


   1/5/17 09:45


 2/4/17 09:44  1/5/17 10:12


 


 


 Heparin Sodium


  (Porcine)


  (Heparin 5000


 units/ml)  5,000 units  EVERY 12  HOURS


 SUBQ


   1/3/17 09:00


 2/2/17 08:59  1/5/17 10:12


 


 


 Heparin Sodium/


 Sodium Chloride


  (Heparin 2000


 units/Ns 1000ml


 premix)  2,000 unit  ONCE  ONCE


 INJ


   1/5/17 17:15


 1/5/17 17:16 UNV  


 


 


 Lidocaine HCl


  (Xylocaine 1%


 30ml)  30 ml  ONCE  ONCE


 INJ


   1/5/17 17:15


 1/5/17 17:16 UNV  


 


 


 Lorazepam


  (Ativan 2mg/ml


 1ml)  0.5 mg  Q4H  PRN


 IV


 For Anxiety  1/2/17 23:45


 1/9/17 23:44   


 


 


 Morphine Sulfate


 4 mg  4 mg  EVERY 4 HOURS  PRN


 IVP


 For Pain  1/3/17 16:00


 1/10/17 15:59  1/4/17 17:33


 


 


 Ondansetron HCl


  (Zofran)  4 mg  Q6H  PRN


 IVP


 Nausea & Vomiting  1/2/17 23:45


 2/1/17 23:44   


 


 


 Polyethylene


 Glycol


  (Miralax)  17 gm  HSPRN  PRN


 ORAL


 Constipation  1/2/17 23:45


 2/1/17 23:44   


 


 


 Sodium Bicarbonate


  (Sodium


 Bicarbonate)  50 ml  ONCE  ONCE


 IV


   1/5/17 17:15


 1/5/17 17:16 UNV  


 


 


 Vancomycin HCl 1


 ea  1 ea  DAILY  PRN


 MISC


 Per rx protocol  1/2/17 23:45


 2/1/17 23:44   


 


 


 Vancomycin HCl/


 Dextrose


  (Vancomycin/D5W


 250ml)  250 ml @ 


 167 mls/hr  Q12HR@0400,1600


 IVPB


   1/3/17 16:00


 1/8/17 15:59  1/5/17 03:58


 


 


 Zolpidem Tartrate


  (Ambien)  5 mg  HSPRN  PRN


 ORAL


 Insomnia  1/2/17 23:45


 2/1/17 23:44   


 

















GE WIN Jan 5, 2017 17:31

## 2017-01-05 NOTE — CONSULTATION
History of Present Illness


General


Date patient seen:  Jan 5, 2017


Chief Complaint:  Left lower extremity cellulitis and pain


Reason for Consultation:  LLE Cellulitis





Present Illness


HPI


46 year old male with left lower extremity cellulitis.  states that he was in 

his normal state of health until day of admission when he had acute onset of 

left lower extremity pain.  he does not recall prior trauma or incident.  

states he had just left the [place] when he noted it.  called EMS because of pain 

which restricted ambulation.  presented with edema, erythema, tenderness, fever

, and leukocytosis.  was admitted and placed on IV Abx.  since has been stable 

but given pain and edema in extremity there were some concerns for potential 

fascitis.  Surgery called for evaluation.  when seen patient states that edema 

is similar but erythema improved a bit.  has some skin changes overlying the 

area of edema.  no blisters or evidence of trauma noted.  compartments soft.


Allergies:  


Coded Allergies:  


     No Known Allergies (Unverified , 1/2/17)





Patient History


History Provided By:  Patient


Healthcare decision maker





Resuscitation status


Full Code


Advanced Directive on File


No





Review of Systems


Constitutional:  Denies: chills, fever, malaise, no symptoms, other, see HPI, 

sweats, weakness


Eye:  Denies: acuity changes, blurred vision, discharge, double vision, eye pain

, no symptoms, nose congestion, nose pain, other, see HPI, tearing


ENT:  Denies: ear discharge, ear pain, hearing loss, mouth pain, nasal discharge

, no symptoms, nose congestion, nose pain, other, see HPI, throat pain, throat 

swelling


Respiratory:  Denies: WING, cough, no symptoms, orthopnea, other, see HPI, 

shortness of breath, sputum, stridor, wheezing


Cardiovascular:  Denies: PND, chest pain, edema, no symptoms, other, 

palpitations, see HPI, syncope


Gastrointestinal:  Denies: abdominal pain, constipation, diarrhea, hematemesis, 

melena, nausea, no symptoms, other, see HPI, vomiting


Genitourinary:  Denies: discharge, dysuria, frequency, hematuria, incontinence, 

no symptoms, other, pain, retention, see HPI, urgency, vag bleed/dc


Musculoskeletal:  Denies: back pain, gout, joint pain, joint swelling, muscle 

pain, muscle stiffness, no symptoms, other - lower extermity pain , see HPI


Skin:  Denies: change in color, change in hair/nails, dryness, lesions, no 

symptoms, other, rash, see HPI


Psychiatric:  Denies: HI, SI, anxiety, depressed feelings, emotional problems, 

hallucinations, no symptoms, other, prior hx, see HPI


Neurological:  Denies: dizziness, focal weakness, headache, no symptoms, 

numbness, other, paresthesia, see HPI, seizure, syncope, tingling, tremors


Endocrine:  Denies: excessive sweating, flushing, increased thirst, increased 

urine, intolerance to temperature, no symptoms, other, see HPI, unexplained 

weight loss


Hematologic/Lymphatic:  Denies: anemia, blood clots, diathesis, easy bleeding, 

easy bruising, no symptoms, other, see HPI, swollen glands


All Other Systems:  negative except mentioned in HPI





Physical Exam


General Appearance:  WD/WN, no apparent distress, alert


Lines, tubes and drains:  peripheral


HEENT:  normocephalic, atraumatic


Neck:  non-tender, normal alignment


Respiratory/Chest:  chest wall non-tender, lungs clear, normal breath sounds, 

no respiratory distress


Cardiovascular/Chest:  normal peripheral pulses, normal rate, regular rhythm


Abdomen:  normal bowel sounds, non tender, soft, no organomegaly


Extremities:  normal range of motion, other - left foot and distal calf with 

edema and erythema.  compartments soft.  pulses present.  range of montion in 

ankle limited from edema.


Neurologic:  CNs II-XII grossly normal, no motor/sensory deficits, alert, 

oriented x 3, responsive





Last 24 Hour Vital Signs








  Date Time  Temp Pulse Resp B/P Pulse Ox O2 Delivery O2 Flow Rate FiO2


 


1/5/17 07:13 98.4       


 


1/5/17 04:00 99.5 79 18 127/67 96 Room Air  


 


1/4/17 21:07 99.1       


 


1/4/17 19:59 102.0 91 20 130/68 98 Room Air  


 


1/4/17 13:00 98.8 79 20 118/72 98 Room Air  


 


1/4/17 12:10 101.8 81 22 116/64 98 Room Air  

















Intake and Output  


 


 1/4/17 1/5/17





 19:00 07:00


 


Intake Total 1150 ml 1657 ml


 


Output Total  475 ml


 


Balance 1150 ml 1182 ml


 


  


 


Intake Oral  340 ml


 


IV Total 1150 ml 1317 ml


 


Output Urine Total  475 ml


 


# Voids 2 3











Laboratory Tests








Test


  1/4/17


09:50 1/4/17


15:00 1/5/17


06:10


 


Random Amikacin Level 3.0 ug/mL    


 


Vancomycin Level Trough


  


  10.2 ug/mL


(5.0-12.0) 


 


 


White Blood Count


  


  


  12.2 K/UL


(4.8-10.8)  H


 


Red Blood Count


  


  


  3.53 M/UL


(4.70-6.10)  L


 


Hemoglobin


  


  


  10.8 G/DL


(14.2-18.0)  L


 


Hematocrit


  


  


  32.8 %


(42.0-52.0)  L


 


Mean Corpuscular Volume   93 FL (80-99)  


 


Mean Corpuscular Hemoglobin


  


  


  30.6 PG


(27.0-31.0)


 


Mean Corpuscular Hemoglobin


Concent 


  


  32.9 G/DL


(32.0-36.0)


 


Red Cell Distribution Width


  


  


  11.6 %


(11.6-14.8)


 


Platelet Count


  


  


  162 K/UL


(150-450)


 


Mean Platelet Volume


  


  


  6.0 FL


(6.5-10.1)  L


 


Neutrophils (%) (Auto)


  


  


  80.2 %


(45.0-75.0)  H


 


Lymphocytes (%) (Auto)


  


  


  9.5 %


(20.0-45.0)  L


 


Monocytes (%) (Auto)


  


  


  9.5 %


(1.0-10.0)


 


Eosinophils (%) (Auto)


  


  


  0.5 %


(0.0-3.0)


 


Basophils (%) (Auto)


  


  


  0.3 %


(0.0-2.0)


 


Sodium Level


  


  


  137 mEQ/L


(135-145)


 


Potassium Level


  


  


  3.7 mEQ/L


(3.4-4.9)


 


Chloride Level


  


  


  104 mEQ/L


()


 


Carbon Dioxide Level


  


  


  24 mEQ/L


(20-30)


 


Anion Gap   9 (5-15)  


 


Blood Urea Nitrogen


  


  


  22 mg/dL


(7-23)


 


Creatinine


  


  


  2.1 mg/dL


(0.7-1.2)  H


 


Estimat Glomerular Filtration


Rate 


  


  41.5 mL/min


(>60)


 


Glucose Level


  


  


  110 mg/dL


()  H


 


Calcium Level


  


  


  7.6 mg/dL


(8.6-10.2)  L


 


Total Bilirubin


  


  


  0.3 mg/dL


(0.0-1.2)


 


Aspartate Amino Transf


(AST/SGOT) 


  


  14 U/L (5-40)  


 


 


Alanine Aminotransferase


(ALT/SGPT) 


  


  8 U/L (3-41)  


 


 


Alkaline Phosphatase


  


  


  68 U/L


()


 


Total Creatine Kinase


  


  


  70 U/L


()


 


Total Protein


  


  


  5.6 g/dL


(6.6-8.7)  L


 


Albumin


  


  


  2.2 g/dL


(3.5-5.2)  L


 


Globulin   3.4 g/dL  


 


Albumin/Globulin Ratio


  


  


  0.6 (1.0-2.7)


L








Height (Feet):  6


Height (Inches):  1.00


Weight (Pounds):  200


Medications





Current Medications








 Medications


  (Trade)  Dose


 Ordered  Sig/Kathleen


 Route


 PRN Reason  Start Time


 Stop Time Status Last Admin


Dose Admin


 


 Acetaminophen


  (Tylenol)  650 mg  Q4H  PRN


 ORAL


 fever  1/2/17 23:45


 2/1/17 23:44  1/5/17 06:14


 


 


 Al Hydroxide/Mg


 Hydroxide


  (Mylanta II)  30 ml  Q6H  PRN


 ORAL


 dyspepsia  1/2/17 23:45


 2/1/17 23:44   


 


 


 Cefepime HCl 1 gm/


 Sodium Chloride  100 ml @ 


 200 mls/hr  EVERY 8  HOURS


 IVPB


   1/3/17 14:00


 1/10/17 13:59  1/5/17 05:28


 


 


 Dextrose


  (Dextrose 50%)    STAT  PRN


 IV


 Hypoglycemia  1/2/17 23:45


 2/1/17 23:44   


 


 


 Dextrose/Sodium


 Chloride


  (D5ns)  1,000 ml @ 


 100 mls/hr  Q10H


 IV


   1/3/17 19:00


 2/2/17 18:59  1/5/17 03:08


 


 


 Heparin Sodium


  (Porcine)


  (Heparin 5000


 units/ml)  5,000 units  EVERY 12  HOURS


 SUBQ


   1/3/17 09:00


 2/2/17 08:59  1/4/17 20:56


 


 


 Lorazepam


  (Ativan 2mg/ml


 1ml)  0.5 mg  Q4H  PRN


 IV


 For Anxiety  1/2/17 23:45


 1/9/17 23:44   


 


 


 Morphine Sulfate


 4 mg  4 mg  EVERY 4 HOURS  PRN


 IVP


 For Pain  1/3/17 16:00


 1/10/17 15:59  1/4/17 17:33


 


 


 Ondansetron HCl


  (Zofran)  4 mg  Q6H  PRN


 IVP


 Nausea & Vomiting  1/2/17 23:45


 2/1/17 23:44   


 


 


 Polyethylene


 Glycol


  (Miralax)  17 gm  HSPRN  PRN


 ORAL


 Constipation  1/2/17 23:45


 2/1/17 23:44   


 


 


 Vancomycin HCl 1


 ea  1 ea  DAILY  PRN


 MISC


 Per rx protocol  1/2/17 23:45


 2/1/17 23:44   


 


 


 Vancomycin HCl/


 Dextrose


  (Vancomycin/D5W


 250ml)  250 ml @ 


 167 mls/hr  Q12HR@0400,1600


 IVPB


   1/3/17 16:00


 1/8/17 15:59  1/5/17 03:58


 


 


 Zolpidem Tartrate


  (Ambien)  5 mg  HSPRN  PRN


 ORAL


 Insomnia  1/2/17 23:45


 2/1/17 23:44   


 











Assessment/Plan


Problem List:  


(1) Cellulitis


ICD Codes:  L03.90 - Cellulitis, unspecified


SNOMED:  777467099


Qualifiers:  


   Qualified Codes:  L03.116 - Cellulitis of left lower limb


(2) Sepsis


ICD Codes:  A41.9 - Sepsis, unspecified organism


SNOMED:  23933649


Assessment/Plan


46 year old male with left lower extremity cellulitis.  Febrile T max 102, 

leukocytosis improved to 12k from 22k, exam stable.  No clinical signs of 

compartment.  





Continue current care and management 


IV Abx


Elevated affected extremity 


Will monitor with serial exams


Recommend CT of Left lower extremity (below knee).











Jaiden Patel MD Jan 5, 2017 09:21

## 2017-01-05 NOTE — INFECTIOUS DISEASES PROG NOTE
Assessment/Plan


Assessment/Plan


A:








  The patient is a 46-year-old male with





Fever  improving 


leucocytosis  improving 


Left lower extremity cellulitis 


   MRI : no evidence of associated abscess.


   Doppler : NO DVT 


Ro bacteremia








ALINE   worsen ( ? Vanco contributing )


Hx of Anemia.


smoker 














PLAN:





Cont  on  cefepime # 4  and change vancomycin d# 4  to Dapto d# 1


Monitor CBC.


Monitor BMP.


Monitor blood culture.


Monitor laboratories.





Subjective


Allergies:  


Coded Allergies:  


     No Known Allergies (Unverified , 1/2/17)


Subjective


feeling  better





Objective


Vital Signs





Last 24 Hour Vital Signs








  Date Time  Temp Pulse Resp B/P Pulse Ox O2 Delivery O2 Flow Rate FiO2


 


1/5/17 16:00 97.7 83 18 106/60 96 Room Air  


 


1/5/17 11:44 98.2 61 21 122/72 97 Room Air  


 


1/5/17 07:13 98.4       


 


1/5/17 04:00 99.5 79 18 127/67 96 Room Air  


 


1/4/17 21:07 99.1       


 


1/4/17 19:59 102.0 91 20 130/68 98 Room Air  








Height (Feet):  6


Height (Inches):  1.00


Weight (Pounds):  200


HEENT:  anicteric


Respiratory/Chest:  normal breath sounds


Cardiovascular:  regularly irregular


Abdomen:  soft, non tender





Microbiology








 Date/Time


Source Procedure


Growth Status


 


 


 1/3/17 14:30


Blood Blood Culture - Preliminary


NO GROWTH AFTER 24 HOURS Resulted


 


 1/3/17 14:15


Blood Blood Culture - Preliminary


NO GROWTH AFTER 24 HOURS Resulted


 


 1/2/17 22:35


Blood Blood Culture - Preliminary


NO GROWTH AFTER 48 HOURS Resulted


 


 1/2/17 22:30


Blood Blood Culture - Preliminary


NO GROWTH AFTER 48 HOURS Resulted











Laboratory Tests








Test


  1/5/17


06:10 1/5/17


17:45


 


White Blood Count


  12.2 K/UL


(4.8-10.8)  H 


 


 


Red Blood Count


  3.53 M/UL


(4.70-6.10)  L 


 


 


Hemoglobin


  10.8 G/DL


(14.2-18.0)  L 


 


 


Hematocrit


  32.8 %


(42.0-52.0)  L 


 


 


Mean Corpuscular Volume 93 FL (80-99)   


 


Mean Corpuscular Hemoglobin


  30.6 PG


(27.0-31.0) 


 


 


Mean Corpuscular Hemoglobin


Concent 32.9 G/DL


(32.0-36.0) 


 


 


Red Cell Distribution Width


  11.6 %


(11.6-14.8) 


 


 


Platelet Count


  162 K/UL


(150-450) 


 


 


Mean Platelet Volume


  6.0 FL


(6.5-10.1)  L 


 


 


Neutrophils (%) (Auto)


  80.2 %


(45.0-75.0)  H 


 


 


Lymphocytes (%) (Auto)


  9.5 %


(20.0-45.0)  L 


 


 


Monocytes (%) (Auto)


  9.5 %


(1.0-10.0) 


 


 


Eosinophils (%) (Auto)


  0.5 %


(0.0-3.0) 


 


 


Basophils (%) (Auto)


  0.3 %


(0.0-2.0) 


 


 


Sodium Level


  137 mEQ/L


(135-145) 138 mEQ/L


(135-145)


 


Potassium Level


  3.7 mEQ/L


(3.4-4.9) 3.6 mEQ/L


(3.4-4.9)


 


Chloride Level


  104 mEQ/L


() 102 mEQ/L


()


 


Carbon Dioxide Level


  24 mEQ/L


(20-30) 23 mEQ/L


(20-30)


 


Anion Gap 9 (5-15)   13 (5-15)  


 


Blood Urea Nitrogen


  22 mg/dL


(7-23) 27 mg/dL


(7-23)  H


 


Creatinine


  2.1 mg/dL


(0.7-1.2)  H 2.5 mg/dL


(0.7-1.2)  H


 


Estimat Glomerular Filtration


Rate 41.5 mL/min


(>60) 33.8 mL/min


(>60)


 


Glucose Level


  110 mg/dL


()  H 149 mg/dL


()  H


 


Calcium Level


  7.6 mg/dL


(8.6-10.2)  L 7.8 mg/dL


(8.6-10.2)  L


 


Total Bilirubin


  0.3 mg/dL


(0.0-1.2) 0.3 mg/dL


(0.0-1.2)


 


Aspartate Amino Transf


(AST/SGOT) 14 U/L (5-40)  


  14 U/L (5-40)  


 


 


Alanine Aminotransferase


(ALT/SGPT) 8 U/L (3-41)  


  8 U/L (3-41)  


 


 


Alkaline Phosphatase


  68 U/L


() 50 U/L


()


 


Total Creatine Kinase


  70 U/L


() 68 U/L


()


 


Total Protein


  5.6 g/dL


(6.6-8.7)  L 5.7 g/dL


(6.6-8.7)  L


 


Albumin


  2.2 g/dL


(3.5-5.2)  L 2.3 g/dL


(3.5-5.2)  L


 


Globulin 3.4 g/dL   3.4 g/dL  


 


Albumin/Globulin Ratio


  0.6 (1.0-2.7)


L 0.6 (1.0-2.7)


L


 


Osmolality  Pending  


 


Uric Acid


  


  6.5 mg/dL


(3.0-7.5)


 


Phosphorus Level


  


  2.2 mg/dL


(2.5-4.8)  L


 


Magnesium Level


  


  1.7 mg/dL


(1.7-2.5)


 


Thyroid Stimulating Hormone


(TSH) 


  0.955 uIU/mL


(0.300-4.500)


 


Free Thyroxine


  


  0.74 ng/dL


(0.86-1.85)  L


 


Free Triiodothyronine


  


  2.1 pg/mL


(2.3-4.2)  L


 


Cortisol  16.4 ug/dL  











Current Medications








 Medications


  (Trade)  Dose


 Ordered  Sig/Kathleen


 Route


 PRN Reason  Start Time


 Stop Time Status Last Admin


Dose Admin


 


 Acetaminophen


  (Tylenol)  650 mg  Q4H  PRN


 ORAL


 fever  1/2/17 23:45


 2/1/17 23:44  1/5/17 16:26


 


 


 Al Hydroxide/Mg


 Hydroxide


  (Mylanta II)  30 ml  Q6H  PRN


 ORAL


 dyspepsia  1/2/17 23:45


 2/1/17 23:44   


 


 


 Cefepime HCl 1 gm/


 Sodium Chloride  100 ml @ 


 200 mls/hr  EVERY 8  HOURS


 IVPB


   1/3/17 14:00


 1/10/17 13:59  1/5/17 05:28


 


 


 Dextrose


  (Dextrose 50%)    STAT  PRN


 IV


 Hypoglycemia  1/2/17 23:45


 2/1/17 23:44   


 


 


 Dextrose/Sodium


 Chloride


  (D5 0.45% NS)  1,000 ml @ 


 150 mls/hr  Q6H40M


 IV


   1/5/17 09:45


 2/4/17 09:44  1/5/17 10:12


 


 


 Heparin Sodium


  (Porcine)


  (Heparin 5000


 units/ml)  5,000 units  EVERY 12  HOURS


 SUBQ


   1/3/17 09:00


 2/2/17 08:59  1/5/17 10:12


 


 


 Heparin Sodium/


 Sodium Chloride


  (Heparin 2000


 units/Ns 1000ml


 premix)  2,000 unit  ONCE


 INJ


   1/5/17 17:15


 1/6/17 23:59   


 


 


 Lidocaine HCl


  (Xylocaine 1%


 30ml)  30 ml  ONCE


 INJ


   1/5/17 17:15


 1/6/17 23:59   


 


 


 Lorazepam


  (Ativan 2mg/ml


 1ml)  0.5 mg  Q4H  PRN


 IV


 For Anxiety  1/2/17 23:45


 1/9/17 23:44   


 


 


 Morphine Sulfate


 4 mg  4 mg  EVERY 4 HOURS  PRN


 IVP


 For Pain  1/3/17 16:00


 1/10/17 15:59  1/4/17 17:33


 


 


 Ondansetron HCl


  (Zofran)  4 mg  Q6H  PRN


 IVP


 Nausea & Vomiting  1/2/17 23:45


 2/1/17 23:44   


 


 


 Polyethylene


 Glycol


  (Miralax)  17 gm  HSPRN  PRN


 ORAL


 Constipation  1/2/17 23:45


 2/1/17 23:44   


 


 


 Sodium Bicarbonate


  (Sodium


 Bicarbonate)  50 ml  ONCE


 IV


   1/5/17 17:15


 1/6/17 23:59   


 


 


 Vancomycin HCl 1


 ea  1 ea  DAILY  PRN


 MISC


 Per rx protocol  1/2/17 23:45


 2/1/17 23:44   


 


 


 Vancomycin HCl/


 Dextrose


  (Vancomycin/D5W


 250ml)  250 ml @ 


 167 mls/hr  Q12HR@0400,1600


 IVPB


   1/3/17 16:00


 1/8/17 15:59  1/5/17 03:58


 


 


 Zolpidem Tartrate


  (Ambien)  5 mg  HSPRN  PRN


 ORAL


 Insomnia  1/2/17 23:45


 2/1/17 23:44   


 

















SKYLAR RICH M.D. Jan 5, 2017 19:37

## 2017-01-06 VITALS — DIASTOLIC BLOOD PRESSURE: 71 MMHG | SYSTOLIC BLOOD PRESSURE: 154 MMHG

## 2017-01-06 VITALS — DIASTOLIC BLOOD PRESSURE: 72 MMHG | SYSTOLIC BLOOD PRESSURE: 138 MMHG

## 2017-01-06 VITALS — DIASTOLIC BLOOD PRESSURE: 87 MMHG | SYSTOLIC BLOOD PRESSURE: 139 MMHG

## 2017-01-06 VITALS — DIASTOLIC BLOOD PRESSURE: 66 MMHG | SYSTOLIC BLOOD PRESSURE: 125 MMHG

## 2017-01-06 VITALS — DIASTOLIC BLOOD PRESSURE: 73 MMHG | SYSTOLIC BLOOD PRESSURE: 131 MMHG

## 2017-01-06 VITALS — DIASTOLIC BLOOD PRESSURE: 77 MMHG | SYSTOLIC BLOOD PRESSURE: 150 MMHG

## 2017-01-06 LAB
ANION GAP SERPL CALC-SCNC: 11 MMOL/L (ref 5–15)
APPEARANCE UR: (no result)
BACTERIA #/AREA URNS HPF: (no result) /HPF
BASOPHILS NFR BLD AUTO: 0.3 % (ref 0–2)
CALCIUM SERPL-MCNC: 7.8 MG/DL (ref 8.6–10.2)
CHLORIDE SERPL-SCNC: 101 MEQ/L (ref 98–107)
CO2 SERPL-SCNC: 24 MEQ/L (ref 20–30)
CREAT SERPL-MCNC: 3.2 MG/DL (ref 0.7–1.2)
EOSINOPHIL NFR BLD AUTO: 1 % (ref 0–3)
ERYTHROCYTE [DISTWIDTH] IN BLOOD BY AUTOMATED COUNT: 11.7 % (ref 11.6–14.8)
GFR SERPLBLD BASED ON 1.73 SQ M-ARVRAT: 25.5 ML/MIN (ref 60–?)
HEMOLYSIS: 0
KETONES UR QL STRIP: (no result)
LEUKOCYTE ESTERASE UR QL STRIP: (no result)
LYMPHOCYTES NFR BLD AUTO: 11.7 % (ref 20–45)
MCH RBC QN AUTO: 29.1 PG (ref 27–31)
MCHC RBC AUTO-ENTMCNC: 32.4 G/DL (ref 32–36)
MCV RBC AUTO: 90 FL (ref 80–99)
MONOCYTES NFR BLD AUTO: 10.3 % (ref 1–10)
NEUTROPHILS NFR BLD AUTO: 76.7 % (ref 45–75)
NITRITE UR QL STRIP: NEGATIVE
PH UR STRIP: 6.5 [PH] (ref 4.5–8)
PLATELET # BLD: 214 K/UL (ref 150–450)
PMV BLD AUTO: 6.2 FL (ref 6.5–10.1)
POTASSIUM SERPL-SCNC: 3.9 MEQ/L (ref 3.4–4.9)
PROT UR QL STRIP: (no result)
RBC # BLD AUTO: 4.3 M/UL (ref 4.7–6.1)
RBC #/AREA URNS HPF: (no result) /HPF (ref 0–0)
SODIUM SERPL-SCNC: 136 MEQ/L (ref 135–145)
SP GR UR STRIP: 1.01 (ref 1–1.03)
SQUAMOUS #/AREA URNS LPF: (no result) /LPF
UROBILINOGEN UR-MCNC: 1 MG/DL (ref 0–1)
WBC # BLD AUTO: 10.9 K/UL (ref 4.8–10.8)
WBC #/AREA URNS HPF: (no result) /HPF (ref 0–0)

## 2017-01-06 RX ADMIN — DEXTROSE AND SODIUM CHLORIDE SCH MLS/HR: 5; .45 INJECTION, SOLUTION INTRAVENOUS at 19:24

## 2017-01-06 RX ADMIN — DEXTROSE AND SODIUM CHLORIDE SCH MLS/HR: 5; .45 INJECTION, SOLUTION INTRAVENOUS at 05:45

## 2017-01-06 RX ADMIN — HEPARIN SODIUM SCH UNIT: 200 INJECTION, SOLUTION INTRAVENOUS at 09:00

## 2017-01-06 RX ADMIN — HEPARIN SODIUM SCH UNITS: 5000 INJECTION INTRAVENOUS; SUBCUTANEOUS at 21:00

## 2017-01-06 RX ADMIN — HEPARIN SODIUM SCH UNITS: 5000 INJECTION INTRAVENOUS; SUBCUTANEOUS at 08:49

## 2017-01-06 RX ADMIN — DEXTROSE MONOHYDRATE SCH MLS/HR: 50 INJECTION, SOLUTION INTRAVENOUS at 08:49

## 2017-01-06 RX ADMIN — LIDOCAINE HYDROCHLORIDE SCH ML: 10 INJECTION, SOLUTION EPIDURAL; INFILTRATION; INTRACAUDAL; PERINEURAL at 09:00

## 2017-01-06 RX ADMIN — DEXTROSE MONOHYDRATE SCH MLS/HR: 50 INJECTION, SOLUTION INTRAVENOUS at 21:47

## 2017-01-06 RX ADMIN — DEXTROSE AND SODIUM CHLORIDE SCH MLS/HR: 5; .45 INJECTION, SOLUTION INTRAVENOUS at 14:10

## 2017-01-06 NOTE — DIAGNOSTIC IMAGING REPORT
Indications: Needs long-term IV access



Technique: Ultrasound confirms patent compressible left basilic vein. Total 

sterile

technique, including  sterile probe cover and sterile gel, hat, mask,, sterile 

gown,

large sterile drape, and preparation with 2% chlorhexidine utilized. Local

anesthesia with 1% lidocaine. Under real-time ultrasound guidance, puncture basilic

vein using 21-gauge needle, documented and archived, passage 0.018 guidewire under

direct fluoroscopy, which was used to determine appropriate catheter length,

exchange for 5 Danish peel-away sheath. 5 Danish Bard  dual-lumen power PICC cut to

49 cm. It was inserted through the peel-away sheath. Peel-away sheath and 

guidewire

removed. Catheter fixed to the skin. Both catheter ports aspirated and flushed.

Patient tolerated procedure well, without immediate complication. Digital 

radiograph

documents satisfactory catheter tip position, at the cavoatrial junction.



Total fluoroscopy time 0.2 minutes.

Total dose area product  4.1 dGycm2





Impression: Successful placement of     PICC under sonographic and fluoroscopic

guidance, as described above.

## 2017-01-06 NOTE — CONSULTATION
Consult Note


Consult Note


asked to eval for renal failure-





Patient is a 46-year-old male who presented after increased left leg pain and 

swelling.  The patient had noted to have increased fever.  He had been having 

increased redness and swelling to his left leg.  A gradual onset of symptoms.  

Patient states that he thought he had cellulitis.  The patient had not been 

vomiting.  He reported having pain with movement.





Patient uncoapporative when interviewed and examined


Assessment/Plan





Status:





Acute renal failure:


Sepsis , Amikacin , Vancomycin....


Cellulitis Left leg


Now on Ceftaroline


Anemia


Smoker





Plan:





Hydrate-


Vanco level-


Adjust Cetaroline dose for CrCl 25


Urine studies- Urine Tox screen-


Monitor renal parameters-


Per orders











ALISON ROTHMAN Jan 6, 2017 14:49

## 2017-01-06 NOTE — PULMONOLOGY PROGRESS NOTE
Assessment/Plan


Assessment/Plan


ASSESSMENT


cellulitis LLE 


pain LLE 


anemia 


ATN





PLAN OF CARE  


[facility] floor  


abx, ID follows , blood cx preliminary negative 


MRI  tibia/fibula - no evidence of OM or abscess, no evidence of fasciitis or 

myositis, 


surgery eval appreciated  


no need for surgical intervention  


venous Duplex bLE negative  


pain management  


IVF at 150


labs pending for this am 


Vanco level stable 


renal US pending 


nephro consult today 


ATN possibly 2 to SE of abx- per ID to change abx? 


DVT prophylaxis


PT/OT 


bowel regimen  


case discussed and evaluated by supervising physician





Subjective


Allergies:  


Coded Allergies:  


     No Known Allergies (Unverified , 1/2/17)


Subjective


afebrile, no leukocytosis


no labs for today yet 


creat up to 2. 5 yesterday





Objective





Last 24 Hour Vital Signs








  Date Time  Temp Pulse Resp B/P Pulse Ox O2 Delivery O2 Flow Rate FiO2


 


1/6/17 08:34 98.4 68 14 139/87 98 Room Air  


 


1/6/17 07:01 98.5       


 


1/6/17 04:00 99.5 65 20 125/66 95 Room Air  


 


1/6/17 00:00 98.6 72 20 138/72 96 Room Air  


 


1/5/17 20:00 98.1 80 18 136/70 97 Room Air  


 


1/5/17 16:00 97.7 83 18 106/60 96 Room Air  


 


1/5/17 11:44 98.2 61 21 122/72 97 Room Air  

















Intake and Output  


 


 1/5/17 1/6/17





 19:00 07:00


 


Intake Total 200 ml 300 ml


 


Output Total  400 ml


 


Balance 200 ml -100 ml


 


  


 


Intake Oral  300 ml


 


IV Total 200 ml 


 


Output Urine Total  400 ml








Respiratory/Chest:  chest wall non-tender, lungs clear, no respiratory distress

, no accessory muscle use


Cardiovascular:  normal peripheral pulses, normal rate, regular rhythm


Abdomen:  normal bowel sounds, soft, non tender, non distended


Extremities:  no cyanosis, no clubbing, other -  left leg edema and erythema 

improved.  blister drained.


Skin:  other -  left leg edema and erythema improved.  blister drained., 

multiple all over the body tattoos


Neurologic/Psychiatric:  CNs II-XII grossly normal, no motor/sensory deficits, 

alert, oriented x 3, responsive


Lymphatic:  no neck adenopathy


Musculoskeletal:  normal muscle bulk





Microbiology








 Date/Time


Source Procedure


Growth Status


 


 


 1/3/17 14:30


Blood Blood Culture - Preliminary


NO GROWTH AFTER 48 HOURS Resulted


 


 1/3/17 14:15


Blood Blood Culture - Preliminary


NO GROWTH AFTER 48 HOURS Resulted








Laboratory Tests


1/5/17 17:45: 


Sodium Level 138, Potassium Level 3.6, Chloride Level 102, Carbon Dioxide Level 

23, Anion Gap 13, Blood Urea Nitrogen 27H, Creatinine 2.5H, Estimat Glomerular 

Filtration Rate 33.8, Glucose Level 149H, Osmolality [Pending], Uric Acid 6.5, 

Calcium Level 7.8L, Phosphorus Level 2.2L, Magnesium Level 1.7, Total Bilirubin 

0.3, Aspartate Amino Transf (AST/SGOT) 14, Alanine Aminotransferase (ALT/SGPT) 8

, Alkaline Phosphatase 50, Total Creatine Kinase 68, Total Protein 5.7L, 

Albumin 2.3L, Globulin 3.4, Albumin/Globulin Ratio 0.6L, Thyroid Stimulating 

Hormone (TSH) 0.955, Free Thyroxine 0.74L, Free Triiodothyronine 2.1L, Cortisol 

16.4


1/6/17 05:00: 


Urine Color Red, Urine Appearance Turbid, Urine pH 6.5, Urine Specific Gravity 

1.015, Urine Protein 4+H, Urine Glucose (UA) Negative, Urine Ketones 1+H, Urine 

Occult Blood 5+H, Urine Nitrite Negative, Urine Bilirubin Negative, Urine 

Urobilinogen 1H, Urine Leukocyte Esterase 2+H, Urine RBC TntcH, Urine WBC TntcH

, Urine Squamous Epithelial Cells Few, Urine Bacteria ModerateH, Urine 

Eosinophils None seen, Urine Random Sodium 11





Current Medications








 Medications


  (Trade)  Dose


 Ordered  Sig/Kathleen


 Route


 PRN Reason  Start Time


 Stop Time Status Last Admin


Dose Admin


 


 Acetaminophen


  (Tylenol)  650 mg  Q4H  PRN


 ORAL


 fever  1/2/17 23:45


 2/1/17 23:44  1/6/17 06:02


 


 


 Al Hydroxide/Mg


 Hydroxide


  (Mylanta II)  30 ml  Q6H  PRN


 ORAL


 dyspepsia  1/2/17 23:45


 2/1/17 23:44   


 


 


 Ceftaroline


 Fosamil/Sodium


 Chloride


  (Teflaro/Sodium


 Chloride 50ml bag)  50 ml @ 50


 mls/hr  Q12HR


 IV


   1/5/17 21:00


 1/12/17 20:59   


 


 


 Dextrose


  (Dextrose 50%)    STAT  PRN


 IV


 Hypoglycemia  1/2/17 23:45


 2/1/17 23:44   


 


 


 Dextrose/Sodium


 Chloride  1,000 ml @ 


 150 mls/hr  Q6H40M


 IV


   1/5/17 09:45


 2/4/17 09:44  1/5/17 10:12


 


 


 Heparin Sodium


  (Porcine)


  (Heparin 5000


 units/ml)  5,000 units  EVERY 12  HOURS


 SUBQ


   1/3/17 09:00


 2/2/17 08:59  1/5/17 10:12


 


 


 Heparin Sodium/


 Sodium Chloride


  (Heparin 2000


 units/Ns 1000ml


 premix)  2,000 unit  ONCE


 INJ


   1/6/17 09:00


 2/5/17 08:59   


 


 


 Lidocaine HCl


  (Xylocaine 1%


 30ml)  30 ml  ONCE


 INJ


   1/6/17 09:00


 2/5/17 08:59   


 


 


 Lorazepam


  (Ativan 2mg/ml


 1ml)  0.5 mg  Q4H  PRN


 IV


 For Anxiety  1/2/17 23:45


 1/9/17 23:44   


 


 


 Morphine Sulfate


 4 mg  4 mg  EVERY 4 HOURS  PRN


 IVP


 For Pain  1/3/17 16:00


 1/10/17 15:59  1/4/17 17:33


 


 


 Ondansetron HCl


  (Zofran)  4 mg  Q6H  PRN


 IVP


 Nausea & Vomiting  1/2/17 23:45


 2/1/17 23:44   


 


 


 Polyethylene


 Glycol


  (Miralax)  17 gm  HSPRN  PRN


 ORAL


 Constipation  1/2/17 23:45


 2/1/17 23:44   


 


 


 Sodium Bicarbonate


  (Sodium


 Bicarbonate)  50 ml  ONCE


 IV


   1/6/17 09:00


 2/5/17 08:59   


 


 


 Zolpidem Tartrate


  (Ambien)  5 mg  HSPRN  PRN


 ORAL


 Insomnia  1/2/17 23:45


 2/1/17 23:44   


 

















Margarette Miranda NP (Vanchtein) Jan 6, 2017 08:44

## 2017-01-06 NOTE — GENERAL SURGERY PROGRESS NOTE
General Surgery-Progress Note


Subjective


Reason for Consult


left lower extremity cellulitis


Chief Complaint:  left leg pain


Symptoms:  improved


Additional Comments


patient seen and examined at bedside.  doing well.  pain improved.  edema 

improved.  states he feels better.  is somewhat non compliant with care (leg 

not elevated when seen this AM and refused to have labs drawn this AM).





Objective





Last 24 Hour Vital Signs








  Date Time  Temp Pulse Resp B/P Pulse Ox O2 Delivery O2 Flow Rate FiO2


 


1/6/17 08:34 98.4 68 14 139/87 98 Room Air  


 


1/6/17 07:01 98.5       


 


1/6/17 04:00 99.5 65 20 125/66 95 Room Air  


 


1/6/17 00:00 98.6 72 20 138/72 96 Room Air  


 


1/5/17 20:00 98.1 80 18 136/70 97 Room Air  


 


1/5/17 16:00 97.7 83 18 106/60 96 Room Air  


 


1/5/17 11:44 98.2 61 21 122/72 97 Room Air  








I&O











Intake and Output  


 


 1/5/17 1/6/17





 19:00 07:00


 


Intake Total 200 ml 300 ml


 


Output Total  400 ml


 


Balance 200 ml -100 ml


 


  


 


Intake Oral  300 ml


 


IV Total 200 ml 


 


Output Urine Total  400 ml








Dressing:  dry


Wound:  clean


Drains:  none


Cardiovascular:  RSR


Respiratory:  clear


Abdomen:  soft, non-tender


Extremities:  no edema, no tenderness, other - left leg edema and erythema 

improved.  blister drained.  much improved from prior exam





Laboratory Tests








Test


  1/5/17


17:45 1/6/17


05:00


 


Sodium Level


  138 mEQ/L


(135-145) 


 


 


Potassium Level


  3.6 mEQ/L


(3.4-4.9) 


 


 


Chloride Level


  102 mEQ/L


() 


 


 


Carbon Dioxide Level


  23 mEQ/L


(20-30) 


 


 


Anion Gap 13 (5-15)   


 


Blood Urea Nitrogen


  27 mg/dL


(7-23)  H 


 


 


Creatinine


  2.5 mg/dL


(0.7-1.2)  H 


 


 


Estimat Glomerular Filtration


Rate 33.8 mL/min


(>60) 


 


 


Glucose Level


  149 mg/dL


()  H 


 


 


Osmolality Pending   


 


Uric Acid


  6.5 mg/dL


(3.0-7.5) 


 


 


Calcium Level


  7.8 mg/dL


(8.6-10.2)  L 


 


 


Phosphorus Level


  2.2 mg/dL


(2.5-4.8)  L 


 


 


Magnesium Level


  1.7 mg/dL


(1.7-2.5) 


 


 


Total Bilirubin


  0.3 mg/dL


(0.0-1.2) 


 


 


Aspartate Amino Transf


(AST/SGOT) 14 U/L (5-40)  


  


 


 


Alanine Aminotransferase


(ALT/SGPT) 8 U/L (3-41)  


  


 


 


Alkaline Phosphatase


  50 U/L


() 


 


 


Total Creatine Kinase


  68 U/L


() 


 


 


Total Protein


  5.7 g/dL


(6.6-8.7)  L 


 


 


Albumin


  2.3 g/dL


(3.5-5.2)  L 


 


 


Globulin 3.4 g/dL   


 


Albumin/Globulin Ratio


  0.6 (1.0-2.7)


L 


 


 


Thyroid Stimulating Hormone


(TSH) 0.955 uIU/mL


(0.300-4.500) 


 


 


Free Thyroxine


  0.74 ng/dL


(0.86-1.85)  L 


 


 


Free Triiodothyronine


  2.1 pg/mL


(2.3-4.2)  L 


 


 


Cortisol 16.4 ug/dL   


 


Urine Color  Red  


 


Urine Appearance  Turbid  


 


Urine pH  6.5 (4.5-8.0)  


 


Urine Specific Gravity


  


  1.015


(1.005-1.035)


 


Urine Protein


  


  4+ (NEGATIVE)


H


 


Urine Glucose (UA)


  


  Negative


(NEGATIVE)


 


Urine Ketones


  


  1+ (NEGATIVE)


H


 


Urine Occult Blood


  


  5+ (NEGATIVE)


H


 


Urine Nitrite


  


  Negative


(NEGATIVE)


 


Urine Bilirubin


  


  Negative


(NEGATIVE)


 


Urine Urobilinogen


  


  1 MG/DL


(0.0-1.0)  H


 


Urine Leukocyte Esterase


  


  2+ (NEGATIVE)


H


 


Urine RBC


  


  Tntc /HPF (0 -


0)  H


 


Urine WBC


  


  Tntc /HPF (0 -


0)  H


 


Urine Squamous Epithelial


Cells 


  Few /LPF


(NONE/OCC)


 


Urine Bacteria


  


  Moderate /HPF


(NONE)  H


 


Urine Eosinophils  None seen  


 


Urine Random Sodium  11 mmol/L  











Assessment


Post-op Diagnosis


46 year old male with left lower extremity cellulitis.  currently afebrile, HD 

stable, and exam much improved.  patient refused labs this morning so cannot 

assess renal function or leukocytosis.  i spoke to him about this and he will 

have labs drawn now.  will follow up labs to ensure renal function improved and 

as well leukocytosis.  MRI demonstrated soft tissue edema but no abscess or 

significant fascial distortion.





Plan


Problems:  


(1) Cellulitis


(2) Sepsis


Additional Comments


continue with IV Abx


diet as tolerated


ambulate and oob


left leg elevated when in bed or chair


follow up labs (trend wbc/renal)











Jaiden Patel MD Jan 6, 2017 10:46

## 2017-01-06 NOTE — INFECTIOUS DISEASES PROG NOTE
Assessment/Plan


Assessment/Plan


A:








  The patient is a 46-year-old male with





Fever  improving 


leucocytosis  improving 


Left lower extremity cellulitis 


   MRI : no evidence of associated abscess.


   Doppler : NO DVT 


Ro bacteremia








ALINE   worsen ( ? Vanco contributing )


Hx of Anemia.


smoker 














PLAN:





Cont  Ceftaroline  d# 1


 ( 1/5 SP on  cefepime and  vancomycin d# 4 ) 


Monitor CBC.  ( refused labs)


Monitor BMP. ( refused labs ) 


Monitor blood culture.


US of Kid





Subjective


Constitutional:  Denies: anorexia, chills, drenching sweats, fatigue, fever, no 

symptoms, other


Allergies:  


Coded Allergies:  


     No Known Allergies (Unverified , 1/2/17)





Objective


Vital Signs





Last 24 Hour Vital Signs








  Date Time  Temp Pulse Resp B/P Pulse Ox O2 Delivery O2 Flow Rate FiO2


 


1/6/17 08:34 98.4 68 14 139/87 98 Room Air  


 


1/6/17 07:01 98.5       


 


1/6/17 04:00 99.5 65 20 125/66 95 Room Air  


 


1/6/17 00:00 98.6 72 20 138/72 96 Room Air  


 


1/5/17 20:00 98.1 80 18 136/70 97 Room Air  


 


1/5/17 16:00 97.7 83 18 106/60 96 Room Air  


 


1/5/17 11:44 98.2 61 21 122/72 97 Room Air  








Height (Feet):  6


Height (Inches):  1.00


Weight (Pounds):  200


HEENT:  atraumatic


Respiratory/Chest:  lungs clear


Cardiovascular:  regular rhythm


Abdomen:  soft, non tender, no mass





Microbiology








 Date/Time


Source Procedure


Growth Status


 


 


 1/3/17 14:30


Blood Blood Culture - Preliminary


NO GROWTH AFTER 48 HOURS Resulted


 


 1/3/17 14:15


Blood Blood Culture - Preliminary


NO GROWTH AFTER 48 HOURS Resulted











Laboratory Tests








Test


  1/5/17


17:45 1/6/17


05:00


 


Sodium Level


  138 mEQ/L


(135-145) 


 


 


Potassium Level


  3.6 mEQ/L


(3.4-4.9) 


 


 


Chloride Level


  102 mEQ/L


() 


 


 


Carbon Dioxide Level


  23 mEQ/L


(20-30) 


 


 


Anion Gap 13 (5-15)   


 


Blood Urea Nitrogen


  27 mg/dL


(7-23)  H 


 


 


Creatinine


  2.5 mg/dL


(0.7-1.2)  H 


 


 


Estimat Glomerular Filtration


Rate 33.8 mL/min


(>60) 


 


 


Glucose Level


  149 mg/dL


()  H 


 


 


Osmolality Pending   


 


Uric Acid


  6.5 mg/dL


(3.0-7.5) 


 


 


Calcium Level


  7.8 mg/dL


(8.6-10.2)  L 


 


 


Phosphorus Level


  2.2 mg/dL


(2.5-4.8)  L 


 


 


Magnesium Level


  1.7 mg/dL


(1.7-2.5) 


 


 


Total Bilirubin


  0.3 mg/dL


(0.0-1.2) 


 


 


Aspartate Amino Transf


(AST/SGOT) 14 U/L (5-40)  


  


 


 


Alanine Aminotransferase


(ALT/SGPT) 8 U/L (3-41)  


  


 


 


Alkaline Phosphatase


  50 U/L


() 


 


 


Total Creatine Kinase


  68 U/L


() 


 


 


Total Protein


  5.7 g/dL


(6.6-8.7)  L 


 


 


Albumin


  2.3 g/dL


(3.5-5.2)  L 


 


 


Globulin 3.4 g/dL   


 


Albumin/Globulin Ratio


  0.6 (1.0-2.7)


L 


 


 


Thyroid Stimulating Hormone


(TSH) 0.955 uIU/mL


(0.300-4.500) 


 


 


Free Thyroxine


  0.74 ng/dL


(0.86-1.85)  L 


 


 


Free Triiodothyronine


  2.1 pg/mL


(2.3-4.2)  L 


 


 


Cortisol 16.4 ug/dL   


 


Urine Color  Red  


 


Urine Appearance  Turbid  


 


Urine pH  6.5 (4.5-8.0)  


 


Urine Specific Gravity


  


  1.015


(1.005-1.035)


 


Urine Protein


  


  4+ (NEGATIVE)


H


 


Urine Glucose (UA)


  


  Negative


(NEGATIVE)


 


Urine Ketones


  


  1+ (NEGATIVE)


H


 


Urine Occult Blood


  


  5+ (NEGATIVE)


H


 


Urine Nitrite


  


  Negative


(NEGATIVE)


 


Urine Bilirubin


  


  Negative


(NEGATIVE)


 


Urine Urobilinogen


  


  1 MG/DL


(0.0-1.0)  H


 


Urine Leukocyte Esterase


  


  2+ (NEGATIVE)


H


 


Urine RBC


  


  Tntc /HPF (0 -


0)  H


 


Urine WBC


  


  Tntc /HPF (0 -


0)  H


 


Urine Squamous Epithelial


Cells 


  Few /LPF


(NONE/OCC)


 


Urine Bacteria


  


  Moderate /HPF


(NONE)  H


 


Urine Eosinophils  None seen  


 


Urine Random Sodium  11 mmol/L  











Current Medications








 Medications


  (Trade)  Dose


 Ordered  Sig/Kathleen


 Route


 PRN Reason  Start Time


 Stop Time Status Last Admin


Dose Admin


 


 Acetaminophen


  (Tylenol)  650 mg  Q4H  PRN


 ORAL


 fever  1/2/17 23:45


 2/1/17 23:44  1/6/17 06:02


 


 


 Al Hydroxide/Mg


 Hydroxide


  (Mylanta II)  30 ml  Q6H  PRN


 ORAL


 dyspepsia  1/2/17 23:45


 2/1/17 23:44   


 


 


 Ceftaroline


 Fosamil/Sodium


 Chloride


  (Teflaro/Sodium


 Chloride 50ml bag)  50 ml @ 50


 mls/hr  Q12HR


 IV


   1/5/17 21:00


 1/12/17 20:59   


 


 


 Dextrose


  (Dextrose 50%)    STAT  PRN


 IV


 Hypoglycemia  1/2/17 23:45


 2/1/17 23:44   


 


 


 Dextrose/Sodium


 Chloride  1,000 ml @ 


 150 mls/hr  Q6H40M


 IV


   1/5/17 09:45


 2/4/17 09:44  1/5/17 10:12


 


 


 Heparin Sodium


  (Porcine)


  (Heparin 5000


 units/ml)  5,000 units  EVERY 12  HOURS


 SUBQ


   1/3/17 09:00


 2/2/17 08:59  1/5/17 10:12


 


 


 Heparin Sodium/


 Sodium Chloride


  (Heparin 2000


 units/Ns 1000ml


 premix)  2,000 unit  ONCE


 INJ


   1/6/17 09:00


 2/5/17 08:59   


 


 


 Lidocaine HCl


  (Xylocaine 1%


 30ml)  30 ml  ONCE


 INJ


   1/6/17 09:00


 2/5/17 08:59   


 


 


 Lorazepam


  (Ativan 2mg/ml


 1ml)  0.5 mg  Q4H  PRN


 IV


 For Anxiety  1/2/17 23:45


 1/9/17 23:44   


 


 


 Morphine Sulfate


 4 mg  4 mg  EVERY 4 HOURS  PRN


 IVP


 For Pain  1/3/17 16:00


 1/10/17 15:59  1/4/17 17:33


 


 


 Ondansetron HCl


  (Zofran)  4 mg  Q6H  PRN


 IVP


 Nausea & Vomiting  1/2/17 23:45


 2/1/17 23:44   


 


 


 Polyethylene


 Glycol


  (Miralax)  17 gm  HSPRN  PRN


 ORAL


 Constipation  1/2/17 23:45


 2/1/17 23:44   


 


 


 Sodium Bicarbonate


  (Sodium


 Bicarbonate)  50 ml  ONCE


 IV


   1/6/17 09:00


 2/5/17 08:59   


 


 


 Zolpidem Tartrate


  (Ambien)  5 mg  HSPRN  PRN


 ORAL


 Insomnia  1/2/17 23:45


 2/1/17 23:44   


 

















SKYLAR RICH M.D. Jan 6, 2017 11:00

## 2017-01-07 VITALS — DIASTOLIC BLOOD PRESSURE: 80 MMHG | SYSTOLIC BLOOD PRESSURE: 139 MMHG

## 2017-01-07 VITALS — SYSTOLIC BLOOD PRESSURE: 134 MMHG | DIASTOLIC BLOOD PRESSURE: 70 MMHG

## 2017-01-07 VITALS — SYSTOLIC BLOOD PRESSURE: 151 MMHG | DIASTOLIC BLOOD PRESSURE: 78 MMHG

## 2017-01-07 LAB
ALBUMIN/GLOB SERPL: 0.6 {RATIO} (ref 1–2.7)
ALT SERPL-CCNC: 10 U/L (ref 3–41)
ANION GAP SERPL CALC-SCNC: 15 MMOL/L (ref 5–15)
AST SERPL-CCNC: 20 U/L (ref 5–40)
BASOPHILS NFR BLD AUTO: 0.8 % (ref 0–2)
CALCIUM SERPL-MCNC: 7.7 MG/DL (ref 8.6–10.2)
CHLORIDE SERPL-SCNC: 101 MEQ/L (ref 98–107)
CHOLEST SERPL-MCNC: 113 MG/DL (ref ?–200)
CHOLEST/HDLC SERPL: 6.6 {RATIO} (ref 3.3–4.4)
CO2 SERPL-SCNC: 22 MEQ/L (ref 20–30)
CREAT SERPL-MCNC: 4.2 MG/DL (ref 0.7–1.2)
CRP SERPL-MCNC: 8.5 MG/DL (ref ?–0.5)
EOSINOPHIL NFR BLD AUTO: 1.7 % (ref 0–3)
ERYTHROCYTE [DISTWIDTH] IN BLOOD BY AUTOMATED COUNT: 11.2 % (ref 11.6–14.8)
GFR SERPLBLD BASED ON 1.73 SQ M-ARVRAT: 18.7 ML/MIN (ref 60–?)
GLOBULIN SER-MCNC: 3.4 G/DL
HBA1C MFR BLD: 6 % (ref ?–6)
HEMOLYSIS: 3
HEMOLYSIS: 4
LDLC SERPL ELPH-MCNC: 67 MG/DL (ref 60–99)
LYMPHOCYTES NFR BLD AUTO: 12.9 % (ref 20–45)
MAGNESIUM SERPL-MCNC: 1.9 MG/DL (ref 1.7–2.5)
MCH RBC QN AUTO: 29.4 PG (ref 27–31)
MCHC RBC AUTO-ENTMCNC: 32.1 G/DL (ref 32–36)
MCV RBC AUTO: 91 FL (ref 80–99)
MONOCYTES NFR BLD AUTO: 13.3 % (ref 1–10)
NEUTROPHILS NFR BLD AUTO: 71.3 % (ref 45–75)
PHOSPHATE SERPL-MCNC: 2.8 MG/DL (ref 2.5–4.8)
PLATELET # BLD: 213 K/UL (ref 150–450)
PMV BLD AUTO: 6 FL (ref 6.5–10.1)
POTASSIUM SERPL-SCNC: 3.9 MEQ/L (ref 3.4–4.9)
PROT SERPL-MCNC: 5.6 G/DL (ref 6.6–8.7)
RBC # BLD AUTO: 3.71 M/UL (ref 4.7–6.1)
SODIUM SERPL-SCNC: 138 MEQ/L (ref 135–145)
TSH SERPL-ACNC: 0.78 UIU/ML (ref 0.3–4.5)
URATE SERPL-MCNC: 8 MG/DL (ref 3–7.5)
WBC # BLD AUTO: 12 K/UL (ref 4.8–10.8)

## 2017-01-07 RX ADMIN — TAMSULOSIN HYDROCHLORIDE SCH MG: 0.4 CAPSULE ORAL at 17:21

## 2017-01-07 RX ADMIN — TAMSULOSIN HYDROCHLORIDE SCH MG: 0.4 CAPSULE ORAL at 11:52

## 2017-01-07 RX ADMIN — DEXTROSE AND SODIUM CHLORIDE SCH MLS/HR: 5; .45 INJECTION, SOLUTION INTRAVENOUS at 01:21

## 2017-01-07 RX ADMIN — DEXTROSE AND SODIUM CHLORIDE SCH MLS/HR: 5; .45 INJECTION, SOLUTION INTRAVENOUS at 08:33

## 2017-01-07 RX ADMIN — DEXTROSE AND SODIUM CHLORIDE SCH MLS/HR: 5; .45 INJECTION, SOLUTION INTRAVENOUS at 14:56

## 2017-01-07 RX ADMIN — HEPARIN SODIUM SCH UNITS: 5000 INJECTION INTRAVENOUS; SUBCUTANEOUS at 20:08

## 2017-01-07 RX ADMIN — LIDOCAINE HYDROCHLORIDE SCH ML: 10 INJECTION, SOLUTION EPIDURAL; INFILTRATION; INTRACAUDAL; PERINEURAL at 08:20

## 2017-01-07 RX ADMIN — DEXTROSE AND SODIUM CHLORIDE SCH MLS/HR: 5; .45 INJECTION, SOLUTION INTRAVENOUS at 21:47

## 2017-01-07 RX ADMIN — HEPARIN SODIUM SCH UNIT: 200 INJECTION, SOLUTION INTRAVENOUS at 08:19

## 2017-01-07 RX ADMIN — DEXTROSE MONOHYDRATE SCH MLS/HR: 50 INJECTION, SOLUTION INTRAVENOUS at 08:35

## 2017-01-07 RX ADMIN — HEPARIN SODIUM SCH UNITS: 5000 INJECTION INTRAVENOUS; SUBCUTANEOUS at 08:35

## 2017-01-07 RX ADMIN — SODIUM CHLORIDE SCH MLS/HR: 0.9 INJECTION INTRAVENOUS at 20:14

## 2017-01-07 NOTE — DIAGNOSTIC IMAGING REPORT
Indication:Elevated Bun and Creatinine.



Technique: Grayscale and duplex Doppler imaging of the kidneys performed.



Comparison: None



Findings:



Both kidneys are echogenic but normal in size. There is no hydronephrosis. The 

right

kidney measures between 12 and 13 cm and the left between 13 and 14 cm in length.

IVC is unremarkable. Bladder is unremarkable.



Impression:



Echogenic kidneys. Medical renal disease suspected.

## 2017-01-07 NOTE — GENERAL PROGRESS NOTE
Assessment/Plan


Status:  unchanged, deteriorating


Status Narrative


Cr higher


Assessment/Plan


Status:





Cellulitis left leg- Sepsis, received Amikacin and Vanco in the past.


Acute renal failure-


HypoAlbuminemia , Proteinuria , ? NS


Multi drug abuse








Plan:





Hydrate-


check 24 h urine for protein


Add flomax


Vanco level- noted


DC antibiotics until dose adjusted


Urine studies- Urine Tox screen- MJ and Amphetamines


Monitor renal parameters- worsening


Per orders





Subjective


ROS Limited/Unobtainable:  No


Constitutional:  Reports: malaise, weakness


Allergies:  


Coded Allergies:  


     SULFUR (Unverified  Allergy, Unknown, unk, 1/7/17)


 Per patient allergic to sulfur





Objective





Last 24 Hour Vital Signs








  Date Time  Temp Pulse Resp B/P Pulse Ox O2 Delivery O2 Flow Rate FiO2


 


1/6/17 20:00 99.7 81 18 150/77 95 Room Air  


 


1/6/17 16:00 98.1 68 18 131/73 98 Room Air  


 


1/6/17 11:56 97.9 65 16 154/71 99 Room Air  

















Intake and Output  


 


 1/6/17 1/7/17





 19:00 07:00


 


Intake Total 1600 ml 1750 ml


 


Output Total 500 ml 


 


Balance 1100 ml 1750 ml


 


  


 


Intake Oral 1000 ml 250 ml


 


IV Total 600 ml 1500 ml


 


Output Urine Total 500 ml 








Laboratory Tests


1/6/17 11:40: 


White Blood Count 10.9H, Red Blood Count 4.30L, Hemoglobin 12.5L, Hematocrit 

38.7L, Mean Corpuscular Volume 90, Mean Corpuscular Hemoglobin 29.1, Mean 

Corpuscular Hemoglobin Concent 32.4, Red Cell Distribution Width 11.7, Platelet 

Count 214, Mean Platelet Volume 6.2L, Neutrophils (%) (Auto) 76.7H, Lymphocytes 

(%) (Auto) 11.7L, Monocytes (%) (Auto) 10.3H, Eosinophils (%) (Auto) 1.0, 

Basophils (%) (Auto) 0.3, Sodium Level 136, Potassium Level 3.9, Chloride Level 

101, Carbon Dioxide Level 24, Anion Gap 11, Blood Urea Nitrogen 34H, Creatinine 

3.2H, Estimat Glomerular Filtration Rate 25.5, Glucose Level 107H, Calcium 

Level 7.8L, Random Vancomycin Level 9.6


1/6/17 15:00: Vancomycin Level Trough 8.4


1/6/17 19:00: 


Urine Eosinophils None seen, Urine Opiates Screen PositiveH, Urine Barbiturates 

Screen Negative, Phencyclidine (PCP) Screen Negative, Urine Amphetamines Screen 

PositiveH, Urine Benzodiazepines Screen Negative, Urine Cocaine Screen Negative

, Urine Marijuana (THC) Screen PositiveH


1/7/17 06:12: 


White Blood Count 12.0H, Red Blood Count 3.71L, Hemoglobin 10.9L, Hematocrit 

33.9L, Mean Corpuscular Volume 91, Mean Corpuscular Hemoglobin 29.4, Mean 

Corpuscular Hemoglobin Concent 32.1, Red Cell Distribution Width 11.2L, 

Platelet Count 213, Mean Platelet Volume 6.0L, Neutrophils (%) (Auto) 71.3, 

Lymphocytes (%) (Auto) 12.9L, Monocytes (%) (Auto) 13.3H, Eosinophils (%) (Auto

) 1.7, Basophils (%) (Auto) 0.8, Sodium Level 138, Potassium Level 3.9, 

Chloride Level 101, Carbon Dioxide Level 22, Anion Gap 15, Blood Urea Nitrogen 

41H, Creatinine 4.2H, Estimat Glomerular Filtration Rate 18.7, Glucose Level 

109H, Calcium Level 7.7L, Random Vancomycin Level 7.7, Hemoglobin A1c 6.0, Uric 

Acid 8.0H, Phosphorus Level 2.8, Magnesium Level 1.9, Total Bilirubin 0.4, 

Gamma Glutamyl Transpeptidase 24, Aspartate Amino Transf (AST/SGOT) 20, Alanine 

Aminotransferase (ALT/SGPT) 10, Alkaline Phosphatase 55, Total Creatine Kinase 

44, C-Reactive Protein, Quantitative 8.5H, Pro-B-Type Natriuretic Peptide 38435T

, Total Protein 5.6L, Albumin 2.2L, Globulin 3.4, Albumin/Globulin Ratio 0.6L, 

Triglycerides Level 143, Cholesterol Level 113, LDL Cholesterol 67, HDL 

Cholesterol 17, Cholesterol/HDL Ratio 6.6H, Thyroid Stimulating Hormone (TSH) 

0.780


Height (Feet):  6


Height (Inches):  1.00


Weight (Pounds):  200


General Appearance:  no apparent distress, lethargic


Cardiovascular:  regular rhythm


Abdomen:  soft


Objective


no change in PE











ALISON ROTHMAN Jan 7, 2017 10:06

## 2017-01-07 NOTE — PULMONOLOGY PROGRESS NOTE
Assessment/Plan


Assessment/Plan


ASSESSMENT


cellulitis LLE 


pain LLE 


anemia 


ATN on chronic renal disease 


possible UTI 


elevated pro BNP 





PLAN OF CARE  


[facility] floor  


abx, ID follows , blood cx preliminary negative , get urine cx stat 


MRI  tibia/fibula - no evidence of OM or abscess, no evidence of fasciitis or 

myositis, 


surgery  follows


no need for surgical intervention  


venous Duplex bLE negative  


pain management  


IVF at 150


nephro consult appreciated, avoid nephrotoxic 


abx regimen to be adjusted as per ID for renal function 


Vanco level stable 


renal US  - no hydro, echogenic kidneys, c/w medical renal disease 


urine tox screen + marijuana, amphetamines, opiates  


recheck pro BNP in am 


ECHO  


DVT prophylaxis


PT/OT 


bowel regimen  





case discussed and evaluated by supervising physician





Subjective


Allergies:  


Coded Allergies:  


     SULFUR (Unverified  Allergy, Unknown, unk, 1/7/17)


 Per patient allergic to sulfur


Subjective


afebrile, leukocytosis today 


creat up to 4.2 


pro [id number]





Objective





Last 24 Hour Vital Signs








  Date Time  Temp Pulse Resp B/P Pulse Ox O2 Delivery O2 Flow Rate FiO2


 


1/7/17 12:05 98.7 66 18 151/78 94 Room Air  


 


1/6/17 20:00 99.7 81 18 150/77 95 Room Air  


 


1/6/17 16:00 98.1 68 18 131/73 98 Room Air  

















Intake and Output  


 


 1/6/17 1/7/17





 19:00 07:00


 


Intake Total 1600 ml 1750 ml


 


Output Total 500 ml 


 


Balance 1100 ml 1750 ml


 


  


 


Intake Oral 1000 ml 250 ml


 


IV Total 600 ml 1500 ml


 


Output Urine Total 500 ml 








Objective


General: NAD, A/A/O x 3 male 


HEENT: NC/AT, EOMI PERRLA, OP moist


Respiratory/Chest:  chest wall non-tender, lungs clear, no respiratory distress

, no accessory muscle use


Cardiovascular:  normal peripheral pulses, normal rate, regular rhythm


Abdomen:  normal bowel sounds, soft, non tender, non distended


Extremities:  no cyanosis, no clubbing, other -  left leg edema and erythema 

improved.  blister drained.


Skin: left leg edema and erythema improved.  blister drained., multiple all 

over the body tattoos


Neurologic/Psychiatric:  CNs II-XII grossly normal, no motor/sensory deficits, 

alert, oriented x 3, responsive


Lymphatic:  no neck adenopathy


Musculoskeletal:  normal muscle bulk


Laboratory Tests


1/6/17 19:00: 


Urine Eosinophils None seen, Urine Opiates Screen PositiveH, Urine Barbiturates 

Screen Negative, Phencyclidine (PCP) Screen Negative, Urine Amphetamines Screen 

PositiveH, Urine Benzodiazepines Screen Negative, Urine Cocaine Screen Negative

, Urine Marijuana (THC) Screen PositiveH


1/7/17 06:12: 


White Blood Count 12.0H, Red Blood Count 3.71L, Hemoglobin 10.9L, Hematocrit 

33.9L, Mean Corpuscular Volume 91, Mean Corpuscular Hemoglobin 29.4, Mean 

Corpuscular Hemoglobin Concent 32.1, Red Cell Distribution Width 11.2L, 

Platelet Count 213, Mean Platelet Volume 6.0L, Neutrophils (%) (Auto) 71.3, 

Lymphocytes (%) (Auto) 12.9L, Monocytes (%) (Auto) 13.3H, Eosinophils (%) (Auto

) 1.7, Basophils (%) (Auto) 0.8, Sodium Level 138, Potassium Level 3.9, 

Chloride Level 101, Carbon Dioxide Level 22, Anion Gap 15, Blood Urea Nitrogen 

41H, Creatinine 4.2H, Estimat Glomerular Filtration Rate 18.7, Glucose Level 

109H, Hemoglobin A1c 6.0, Uric Acid 8.0H, Calcium Level 7.7L, Phosphorus Level 

2.8, Magnesium Level 1.9, Total Bilirubin 0.4, Gamma Glutamyl Transpeptidase 24

, Aspartate Amino Transf (AST/SGOT) 20, Alanine Aminotransferase (ALT/SGPT) 10, 

Alkaline Phosphatase 55, Total Creatine Kinase 44, C-Reactive Protein, 

Quantitative 8.5H, Pro-B-Type Natriuretic Peptide 26732A, Total Protein 5.6L, 

Albumin 2.2L, Globulin 3.4, Albumin/Globulin Ratio 0.6L, Triglycerides Level 143

, Cholesterol Level 113, LDL Cholesterol 67, HDL Cholesterol 17, Cholesterol/

HDL Ratio 6.6H, Thyroid Stimulating Hormone (TSH) 0.780, Random Vancomycin 

Level 7.7





Current Medications








 Medications


  (Trade)  Dose


 Ordered  Sig/Kathleen


 Route


 PRN Reason  Start Time


 Stop Time Status Last Admin


Dose Admin


 


 Acetaminophen


  (Tylenol)  650 mg  Q4H  PRN


 ORAL


 fever  1/2/17 23:45


 2/1/17 23:44  1/7/17 02:46


 


 


 Ceftaroline


 Fosamil/Sodium


 Chloride


  (Teflaro/Sodium


 Chloride 50ml bag)  50 ml @ 50


 mls/hr  Q12HR


 IV


   1/7/17 21:00


 1/14/17 20:59   


 


 


 Dextrose


  (Dextrose 50%)    STAT  PRN


 IV


 Hypoglycemia  1/2/17 23:45


 2/1/17 23:44   


 


 


 Dextrose/Sodium


 Chloride


  (D5 0.45% NS)  1,000 ml @ 


 150 mls/hr  Q6H40M


 IV


   1/5/17 09:45


 2/4/17 09:44  1/7/17 14:56


 


 


 Heparin Sodium


  (Porcine)


  (Heparin 5000


 units/ml)  5,000 units  EVERY 12  HOURS


 SUBQ


   1/3/17 09:00


 2/2/17 08:59  1/5/17 10:12


 


 


 Heparin Sodium/


 Sodium Chloride


  (Heparin 2000


 units/Ns 1000ml


 premix)  2,000 unit  ONCE


 INJ


   1/6/17 09:00


 2/5/17 08:59   


 


 


 Lidocaine HCl


  (Xylocaine 1%


 30ml)  30 ml  ONCE


 INJ


   1/6/17 09:00


 2/5/17 08:59   


 


 


 Lorazepam


  (Ativan 2mg/ml


 1ml)  0.5 mg  Q4H  PRN


 IV


 For Anxiety  1/2/17 23:45


 1/9/17 23:44   


 


 


 Morphine Sulfate


 4 mg  4 mg  EVERY 4 HOURS  PRN


 IVP


 For Pain  1/3/17 16:00


 1/10/17 15:59  1/4/17 17:33


 


 


 Ondansetron HCl


  (Zofran)  4 mg  Q6H  PRN


 IVP


 Nausea & Vomiting  1/2/17 23:45


 2/1/17 23:44   


 


 


 Polyethylene


 Glycol


  (Miralax)  17 gm  HSPRN  PRN


 ORAL


 Constipation  1/2/17 23:45


 2/1/17 23:44   


 


 


 Tamsulosin HCl


 0.4 mg  0.4 mg  BID


 ORAL


   1/7/17 11:30


 2/6/17 11:29  1/7/17 11:52


 


 


 Zolpidem Tartrate


  (Ambien)  5 mg  HSPRN  PRN


 ORAL


 Insomnia  1/2/17 23:45


 2/1/17 23:44   


 

















Margarette Miranda NP (Vanchtein) Jan 7, 2017 15:07

## 2017-01-07 NOTE — GENERAL SURGERY PROGRESS NOTE
General Surgery-Progress Note


Subjective


Chief Complaint:  feels better, less pain and leg inflammation, urine less 

dark. Hx UTIs


Symptoms:  worse, improved


Additional Comments


BUN/Creatinine rising!!  UA: 2+ leukocyte esterase, proteinuria, TNTC bacteria, 

(Frequent anal sex without protection)





Objective





Last 24 Hour Vital Signs








  Date Time  Temp Pulse Resp B/P Pulse Ox O2 Delivery O2 Flow Rate FiO2


 


1/7/17 12:05 98.7 66 18 151/78 94 Room Air  


 


1/6/17 20:00 99.7 81 18 150/77 95 Room Air  


 


1/6/17 16:00 98.1 68 18 131/73 98 Room Air  








I&O











Intake and Output  


 


 1/6/17 1/7/17





 19:00 07:00


 


Intake Total 1600 ml 1750 ml


 


Output Total 500 ml 


 


Balance 1100 ml 1750 ml


 


  


 


Intake Oral 1000 ml 250 ml


 


IV Total 600 ml 1500 ml


 


Output Urine Total 500 ml 








Wound:  clean - some bleisters medial lower leg, much less inflammation and 

erythema


Cardiovascular:  RSR


Respiratory:  clear


Abdomen:  soft, non-tender


Extremities:  edema - decreased in left lower leg, clean ruptured bulla





Laboratory Tests








Test


  1/6/17


15:00 1/6/17


19:00 1/7/17


06:12


 


Vancomycin Level Trough


  8.4 ug/mL


(5.0-12.0) 


  


 


 


Urine Eosinophils  None seen   


 


Urine Opiates Screen


  


  Positive


(NEGATIVE)  H 


 


 


Urine Barbiturates Screen


  


  Negative


(NEGATIVE) 


 


 


Phencyclidine (PCP) Screen


  


  Negative


(NEGATIVE) 


 


 


Urine Amphetamines Screen


  


  Positive


(NEGATIVE)  H 


 


 


Urine Benzodiazepines Screen


  


  Negative


(NEGATIVE) 


 


 


Urine Cocaine Screen


  


  Negative


(NEGATIVE) 


 


 


Urine Marijuana (THC) Screen


  


  Positive


(NEGATIVE)  H 


 


 


White Blood Count


  


  


  12.0 K/UL


(4.8-10.8)  H


 


Red Blood Count


  


  


  3.71 M/UL


(4.70-6.10)  L


 


Hemoglobin


  


  


  10.9 G/DL


(14.2-18.0)  L


 


Hematocrit


  


  


  33.9 %


(42.0-52.0)  L


 


Mean Corpuscular Volume   91 FL (80-99)  


 


Mean Corpuscular Hemoglobin


  


  


  29.4 PG


(27.0-31.0)


 


Mean Corpuscular Hemoglobin


Concent 


  


  32.1 G/DL


(32.0-36.0)


 


Red Cell Distribution Width


  


  


  11.2 %


(11.6-14.8)  L


 


Platelet Count


  


  


  213 K/UL


(150-450)


 


Mean Platelet Volume


  


  


  6.0 FL


(6.5-10.1)  L


 


Neutrophils (%) (Auto)


  


  


  71.3 %


(45.0-75.0)


 


Lymphocytes (%) (Auto)


  


  


  12.9 %


(20.0-45.0)  L


 


Monocytes (%) (Auto)


  


  


  13.3 %


(1.0-10.0)  H


 


Eosinophils (%) (Auto)


  


  


  1.7 %


(0.0-3.0)


 


Basophils (%) (Auto)


  


  


  0.8 %


(0.0-2.0)


 


Sodium Level


  


  


  138 mEQ/L


(135-145)


 


Potassium Level


  


  


  3.9 mEQ/L


(3.4-4.9)


 


Chloride Level


  


  


  101 mEQ/L


()


 


Carbon Dioxide Level


  


  


  22 mEQ/L


(20-30)


 


Anion Gap   15 (5-15)  


 


Blood Urea Nitrogen


  


  


  41 mg/dL


(7-23)  H


 


Creatinine


  


  


  4.2 mg/dL


(0.7-1.2)  H


 


Estimat Glomerular Filtration


Rate 


  


  18.7 mL/min


(>60)


 


Glucose Level


  


  


  109 mg/dL


()  H


 


Hemoglobin A1c   6.0 % (< 6.0)  


 


Uric Acid


  


  


  8.0 mg/dL


(3.0-7.5)  H


 


Calcium Level


  


  


  7.7 mg/dL


(8.6-10.2)  L


 


Phosphorus Level


  


  


  2.8 mg/dL


(2.5-4.8)


 


Magnesium Level


  


  


  1.9 mg/dL


(1.7-2.5)


 


Total Bilirubin


  


  


  0.4 mg/dL


(0.0-1.2)


 


Gamma Glutamyl Transpeptidase   24 U/L (8-61)  


 


Aspartate Amino Transf


(AST/SGOT) 


  


  20 U/L (5-40)  


 


 


Alanine Aminotransferase


(ALT/SGPT) 


  


  10 U/L (3-41)  


 


 


Alkaline Phosphatase


  


  


  55 U/L


()


 


Total Creatine Kinase


  


  


  44 U/L


()


 


C-Reactive Protein,


Quantitative 


  


  8.5 mg/dL (<


0.5)  H


 


Pro-B-Type Natriuretic Peptide


  


  


  19523 pg/mL


(0-125)  H


 


Total Protein


  


  


  5.6 g/dL


(6.6-8.7)  L


 


Albumin


  


  


  2.2 g/dL


(3.5-5.2)  L


 


Globulin   3.4 g/dL  


 


Albumin/Globulin Ratio


  


  


  0.6 (1.0-2.7)


L


 


Triglycerides Level


  


  


  143 mg/dL (<


150)


 


Cholesterol Level


  


  


  113 mg/dL (<


200)


 


LDL Cholesterol


  


  


  67 mg/dL


(60-99)


 


HDL Cholesterol


  


  


  17 mg/dL (>


60)


 


Cholesterol/HDL Ratio


  


  


  6.6 (3.3-4.4)


H


 


Thyroid Stimulating Hormone


(TSH) 


  


  0.780 uIU/mL


(0.300-4.500)


 


Random Vancomycin Level   7.7 ug/mL  








Imaging


MRI.lower left leg: soft tissue and skin edema, no obvious compartment syndrome 

suggestion.


Additional Comments


Very concerning acute renal function, Elevated beta natruiretic peptide, hx 

dark urine.  ARF, cause undetermined:  infection?, cardiac malfunction, toxic 

injury?





Assessment


Additional Comments


needs cardiac eval, nephrology in case, check urine C&S.


Continue leg elevation and antibiotics for leg cellulitis.











CARLI EWING Jan 7, 2017 14:37

## 2017-01-08 VITALS — DIASTOLIC BLOOD PRESSURE: 78 MMHG | SYSTOLIC BLOOD PRESSURE: 146 MMHG

## 2017-01-08 VITALS — DIASTOLIC BLOOD PRESSURE: 64 MMHG | SYSTOLIC BLOOD PRESSURE: 109 MMHG

## 2017-01-08 VITALS — DIASTOLIC BLOOD PRESSURE: 76 MMHG | SYSTOLIC BLOOD PRESSURE: 137 MMHG

## 2017-01-08 VITALS — DIASTOLIC BLOOD PRESSURE: 75 MMHG | SYSTOLIC BLOOD PRESSURE: 150 MMHG

## 2017-01-08 VITALS — SYSTOLIC BLOOD PRESSURE: 160 MMHG | DIASTOLIC BLOOD PRESSURE: 78 MMHG

## 2017-01-08 VITALS — SYSTOLIC BLOOD PRESSURE: 145 MMHG | DIASTOLIC BLOOD PRESSURE: 80 MMHG

## 2017-01-08 LAB
ALBUMIN/GLOB SERPL: 0.6 {RATIO} (ref 1–2.7)
ALT SERPL-CCNC: 12 U/L (ref 3–41)
ANION GAP SERPL CALC-SCNC: 14 MMOL/L (ref 5–15)
AST SERPL-CCNC: 22 U/L (ref 5–40)
BASOPHILS NFR BLD AUTO: 1.4 % (ref 0–2)
CALCIUM SERPL-MCNC: 7.5 MG/DL (ref 8.6–10.2)
CHLORIDE SERPL-SCNC: 96 MEQ/L (ref 98–107)
CO2 SERPL-SCNC: 21 MEQ/L (ref 20–30)
CREAT SERPL-MCNC: 5.1 MG/DL (ref 0.7–1.2)
CRP SERPL-MCNC: 6.5 MG/DL (ref ?–0.5)
EOSINOPHIL NFR BLD AUTO: 3 % (ref 0–3)
ERYTHROCYTE [DISTWIDTH] IN BLOOD BY AUTOMATED COUNT: 11.3 % (ref 11.6–14.8)
GFR SERPLBLD BASED ON 1.73 SQ M-ARVRAT: 14.9 ML/MIN (ref 60–?)
GLOBULIN SER-MCNC: 3.2 G/DL
HEMOLYSIS: 2
LYMPHOCYTES NFR BLD AUTO: 12.3 % (ref 20–45)
MAGNESIUM SERPL-MCNC: 1.9 MG/DL (ref 1.7–2.5)
MCH RBC QN AUTO: 28.9 PG (ref 27–31)
MCHC RBC AUTO-ENTMCNC: 32.3 G/DL (ref 32–36)
MCV RBC AUTO: 90 FL (ref 80–99)
MONOCYTES NFR BLD AUTO: 12.3 % (ref 1–10)
NEUTROPHILS NFR BLD AUTO: 70.9 % (ref 45–75)
PHOSPHATE SERPL-MCNC: 3.6 MG/DL (ref 2.5–4.8)
PLATELET # BLD: 252 K/UL (ref 150–450)
PMV BLD AUTO: 6.1 FL (ref 6.5–10.1)
POTASSIUM SERPL-SCNC: 3.6 MEQ/L (ref 3.4–4.9)
PROT 24H UR-MCNC: 959.5 MG/24HR (ref ?–150)
PROT SERPL-MCNC: 5.3 G/DL (ref 6.6–8.7)
PROT UR STRIP.AUTO-MCNC: 202 MG/DL
RBC # BLD AUTO: 3.89 M/UL (ref 4.7–6.1)
SODIUM SERPL-SCNC: 131 MEQ/L (ref 135–145)
URATE SERPL-MCNC: 8.3 MG/DL (ref 3–7.5)
WBC # BLD AUTO: 10.5 K/UL (ref 4.8–10.8)

## 2017-01-08 RX ADMIN — HEPARIN SODIUM SCH UNIT: 200 INJECTION, SOLUTION INTRAVENOUS at 09:00

## 2017-01-08 RX ADMIN — LIDOCAINE HYDROCHLORIDE SCH ML: 10 INJECTION, SOLUTION EPIDURAL; INFILTRATION; INTRACAUDAL; PERINEURAL at 09:00

## 2017-01-08 RX ADMIN — TAMSULOSIN HYDROCHLORIDE SCH MG: 0.4 CAPSULE ORAL at 17:08

## 2017-01-08 RX ADMIN — HEPARIN SODIUM SCH UNITS: 5000 INJECTION INTRAVENOUS; SUBCUTANEOUS at 20:30

## 2017-01-08 RX ADMIN — SODIUM CHLORIDE SCH MLS/HR: 0.9 INJECTION INTRAVENOUS at 09:45

## 2017-01-08 RX ADMIN — DEXTROSE AND SODIUM CHLORIDE SCH MLS/HR: 5; .45 INJECTION, SOLUTION INTRAVENOUS at 03:17

## 2017-01-08 RX ADMIN — SODIUM CHLORIDE SCH MLS/HR: 0.9 INJECTION INTRAVENOUS at 20:30

## 2017-01-08 RX ADMIN — TAMSULOSIN HYDROCHLORIDE SCH MG: 0.4 CAPSULE ORAL at 09:44

## 2017-01-08 RX ADMIN — HEPARIN SODIUM SCH UNITS: 5000 INJECTION INTRAVENOUS; SUBCUTANEOUS at 09:00

## 2017-01-08 NOTE — INFECTIOUS DISEASES PROG NOTE
Assessment/Plan


Assessment/Plan


A:








  The patient is a 46-year-old male with





Fever  imSP 


leucocytosis  SP 


Left lower extremity cellulitis   improved significantly 


   MRI : no evidence of associated abscess.


   Doppler : NO DVT 











Urine Tox screen: +Ve  MJ and Amphetamines


ALINE   worsen   less urine out put( ? Vanco contributing )  need HD as per 

Nephro 


   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 Medical renal disease suspected.


Hx of Anemia.


smoker 














PLAN:





Cont  Ceftaroline  d# 3 / 7


 ( 1/5 SP on  cefepime and  vancomycin d# 4 ) 


Monitor CBC. 


Monitor BMP.


Monitor blood culture.


HD access on monday





Subjective


Allergies:  


Coded Allergies:  


     SULFUR (Unverified  Allergy, Unknown, unk, 1/7/17)


 Per patient allergic to sulfur





Objective


Vital Signs





Last 24 Hour Vital Signs








  Date Time  Temp Pulse Resp B/P Pulse Ox O2 Delivery O2 Flow Rate FiO2


 


1/8/17 08:00 98.2 63 16 146/78 97 Room Air  


 


1/8/17 00:00 97.9 71 18 109/64 94 Room Air  


 


1/7/17 19:00 98.2 70 20 134/70 97 Room Air  


 


1/7/17 15:47 98.8 65 20 139/80 98 Room Air  


 


1/7/17 12:05 98.7 66 18 151/78 94 Room Air  








Height (Feet):  6


Height (Inches):  1.00


Weight (Pounds):  200





Microbiology








 Date/Time


Source Procedure


Growth Status


 


 


 1/7/17 17:05


Urine,Clean Catch Urine Culture - Preliminary


NO GROWTH Resulted


 


 1/6/17 05:00


Urine,Clean Catch Urine Culture - Preliminary


NO GROWTH AFTER 24 HOURS Resulted











Laboratory Tests








Test


  1/8/17


08:15


 


White Blood Count


  10.5 K/UL


(4.8-10.8)


 


Red Blood Count


  3.89 M/UL


(4.70-6.10)  L


 


Hemoglobin


  11.3 G/DL


(14.2-18.0)  L


 


Hematocrit


  34.8 %


(42.0-52.0)  L


 


Mean Corpuscular Volume 90 FL (80-99)  


 


Mean Corpuscular Hemoglobin


  28.9 PG


(27.0-31.0)


 


Mean Corpuscular Hemoglobin


Concent 32.3 G/DL


(32.0-36.0)


 


Red Cell Distribution Width


  11.3 %


(11.6-14.8)  L


 


Platelet Count


  252 K/UL


(150-450)


 


Mean Platelet Volume


  6.1 FL


(6.5-10.1)  L


 


Neutrophils (%) (Auto)


  70.9 %


(45.0-75.0)


 


Lymphocytes (%) (Auto)


  12.3 %


(20.0-45.0)  L


 


Monocytes (%) (Auto)


  12.3 %


(1.0-10.0)  H


 


Eosinophils (%) (Auto)


  3.0 %


(0.0-3.0)


 


Basophils (%) (Auto)


  1.4 %


(0.0-2.0)


 


Sodium Level


  131 mEQ/L


(135-145)  L


 


Potassium Level


  3.6 mEQ/L


(3.4-4.9)


 


Chloride Level


  96 mEQ/L


()  L


 


Carbon Dioxide Level


  21 mEQ/L


(20-30)


 


Anion Gap 14 (5-15)  


 


Blood Urea Nitrogen


  43 mg/dL


(7-23)  H


 


Creatinine


  5.1 mg/dL


(0.7-1.2)  H


 


Estimat Glomerular Filtration


Rate 14.9 mL/min


(>60)


 


Glucose Level


  146 mg/dL


()  H


 


Uric Acid


  8.3 mg/dL


(3.0-7.5)  H


 


Calcium Level


  7.5 mg/dL


(8.6-10.2)  L


 


Phosphorus Level


  3.6 mg/dL


(2.5-4.8)


 


Magnesium Level


  1.9 mg/dL


(1.7-2.5)


 


Total Bilirubin


  0.3 mg/dL


(0.0-1.2)


 


Aspartate Amino Transf


(AST/SGOT) 22 U/L (5-40)  


 


 


Alanine Aminotransferase


(ALT/SGPT) 12 U/L (3-41)  


 


 


Alkaline Phosphatase


  48 U/L


()


 


Total Creatine Kinase


  43 U/L


()


 


C-Reactive Protein,


Quantitative 6.5 mg/dL (<


0.5)  H


 


Pro-B-Type Natriuretic Peptide


  33656 pg/mL


(0-125)  H


 


Total Protein


  5.3 g/dL


(6.6-8.7)  L


 


Albumin


  2.1 g/dL


(3.5-5.2)  L


 


Globulin 3.2 g/dL  


 


Albumin/Globulin Ratio


  0.6 (1.0-2.7)


L











Current Medications








 Medications


  (Trade)  Dose


 Ordered  Sig/Kathleen


 Route


 PRN Reason  Start Time


 Stop Time Status Last Admin


Dose Admin


 


 Acetaminophen


  (Tylenol)  650 mg  Q4H  PRN


 ORAL


 fever  1/2/17 23:45


 2/1/17 23:44  1/7/17 02:46


 


 


 Ceftaroline


 Fosamil 300 mg/


 Sodium Chloride  50 ml @ 50


 mls/hr  Q12HR


 IV


   1/7/17 21:00


 1/14/17 20:59  1/8/17 09:45


 


 


 Dextrose


  (Dextrose 50%)    STAT  PRN


 IV


 Hypoglycemia  1/2/17 23:45


 2/1/17 23:44   


 


 


 Heparin Sodium


  (Porcine)


  (Heparin 5000


 units/ml)  5,000 units  EVERY 12  HOURS


 SUBQ


   1/3/17 09:00


 2/2/17 08:59  1/5/17 10:12


 


 


 Heparin Sodium/


 Sodium Chloride


  (Heparin 2000


 units/Ns 1000ml


 premix)  2,000 unit  ONCE


 INJ


   1/6/17 09:00


 2/5/17 08:59   


 


 


 Lidocaine HCl


  (Xylocaine 1%


 30ml)  30 ml  ONCE


 INJ


   1/6/17 09:00


 2/5/17 08:59   


 


 


 Lorazepam


  (Ativan 2mg/ml


 1ml)  0.5 mg  Q4H  PRN


 IV


 For Anxiety  1/2/17 23:45


 1/9/17 23:44   


 


 


 Morphine Sulfate


  (Morphine


 Sulfate)  4 mg  EVERY 4 HOURS  PRN


 IVP


 For Pain  1/3/17 16:00


 1/10/17 15:59  1/4/17 17:33


 


 


 Ondansetron HCl


  (Zofran)  4 mg  Q6H  PRN


 IVP


 Nausea & Vomiting  1/2/17 23:45


 2/1/17 23:44   


 


 


 Polyethylene


 Glycol


  (Miralax)  17 gm  HSPRN  PRN


 ORAL


 Constipation  1/2/17 23:45


 2/1/17 23:44   


 


 


 Sodium Chloride


  (Sodium Chloride


 1000ml bag)  1,000 ml @ 


 100 mls/hr  Q10H


 IV


   1/8/17 10:00


 2/7/17 09:59  1/8/17 11:09


 


 


 Tamsulosin HCl


 0.4 mg  0.4 mg  BID


 ORAL


   1/7/17 11:30


 2/6/17 11:29  1/8/17 09:44


 


 


 Zolpidem Tartrate


  (Ambien)  5 mg  HSPRN  PRN


 ORAL


 Insomnia  1/2/17 23:45


 2/1/17 23:44   


 

















SKYLAR RICH M.D. Jan 8, 2017 12:01

## 2017-01-08 NOTE — GENERAL PROGRESS NOTE
Assessment/Plan


Status:  unchanged, deteriorating


Status Narrative


Cr rising


Assessment/Plan


Status:





Cellulitis left leg- Sepsis, received Amikacin and Vanco in the past.


Acute renal failure-


HypoAlbuminemia , Proteinuria , ? NS


Multi drug abuse








Plan:





Hydrate- change IV to NS-


check 24 h urine for protein


Add flomax


Vanco level- noted


DC antibiotics until dose adjusted


Urine studies- Urine Tox screen- MJ and Amphetamines


Monitor renal parameters- worsening


placement of dialysis catheter, patinet not agreeable as of now-


Per orders





Subjective


ROS Limited/Unobtainable:  No


Constitutional:  Reports: malaise, weakness


Allergies:  


Coded Allergies:  


     SULFUR (Unverified  Allergy, Unknown, unk, 1/7/17)


 Per patient allergic to sulfur





Objective





Last 24 Hour Vital Signs








  Date Time  Temp Pulse Resp B/P Pulse Ox O2 Delivery O2 Flow Rate FiO2


 


1/8/17 08:00 98.2 63 16 146/78 97 Room Air  


 


1/8/17 00:00 97.9 71 18 109/64 94 Room Air  


 


1/7/17 19:00 98.2 70 20 134/70 97 Room Air  


 


1/7/17 15:47 98.8 65 20 139/80 98 Room Air  


 


1/7/17 12:05 98.7 66 18 151/78 94 Room Air  

















Intake and Output  


 


 1/7/17 1/8/17





 19:00 07:00


 


Intake Total 1250 ml 1220 ml


 


Output Total 250 ml 300 ml


 


Balance 1000 ml 920 ml


 


  


 


Intake Oral 50 ml 320 ml


 


IV Total 1200 ml 900 ml


 


Output Urine Total 250 ml 300 ml


 


# Voids  3








Laboratory Tests


1/8/17 08:15: 


White Blood Count 10.5, Red Blood Count 3.89L, Hemoglobin 11.3L, Hematocrit 

34.8L, Mean Corpuscular Volume 90, Mean Corpuscular Hemoglobin 28.9, Mean 

Corpuscular Hemoglobin Concent 32.3, Red Cell Distribution Width 11.3L, 

Platelet Count 252, Mean Platelet Volume 6.1L, Neutrophils (%) (Auto) 70.9, 

Lymphocytes (%) (Auto) 12.3L, Monocytes (%) (Auto) 12.3H, Eosinophils (%) (Auto

) 3.0, Basophils (%) (Auto) 1.4, Sodium Level 131L, Potassium Level 3.6, 

Chloride Level 96L, Carbon Dioxide Level 21, Anion Gap 14, Blood Urea Nitrogen 

43H, Creatinine 5.1H, Estimat Glomerular Filtration Rate 14.9, Glucose Level 

146H, Uric Acid 8.3H, Calcium Level 7.5L, Phosphorus Level 3.6, Magnesium Level 

1.9, Total Bilirubin 0.3, Aspartate Amino Transf (AST/SGOT) 22, Alanine 

Aminotransferase (ALT/SGPT) 12, Alkaline Phosphatase 48, Total Creatine Kinase 

43, C-Reactive Protein, Quantitative 6.5H, Pro-B-Type Natriuretic Peptide 48941P

, Total Protein 5.3L, Albumin 2.1L, Globulin 3.2, Albumin/Globulin Ratio 0.6L


Height (Feet):  6


Height (Inches):  1.00


Weight (Pounds):  200


General Appearance:  no apparent distress


Objective


no change in PE











ALISON ROTHMAN Jan 8, 2017 09:55

## 2017-01-08 NOTE — GENERAL PROGRESS NOTE
Progress Note


Progress Note


Afebrile, pain decreased, no pus in wound.  Bullae ruptured, crust present.


Renal failure worsening.  May need HD.











CARLI EWING Jan 8, 2017 12:25

## 2017-01-08 NOTE — PULMONOLOGY PROGRESS NOTE
Assessment/Plan


Assessment/Plan


ASSESSMENT


cellulitis LLE 


pain LLE 


anemia 


ATN on chronic renal disease -worsening 


possible UTI 


elevated pro BNP   


drug abuse ( amphetamine) 





PLAN OF CARE  


[facility] floor  


abx, ID follows , blood cx preliminary negative , urine cx preliminary negative


MRI  tibia/fibula - no evidence of OM or abscess, no evidence of fasciitis or 

myositis, 


surgery  follows


no need for surgical intervention  


venous Duplex bLE negative  


pain management  


IVF  


nephro follows 


renal parameters worse, creat trending up


nephro ordered placement of HD catheter, likely will need HD if no change in 

renal parameters in 1-2 days 


avoid nephrotoxic 


abx regimen  adjusted   for renal function 


Vanco level stable 


renal US  - no hydro, echogenic kidneys, c/w medical renal disease 


urine tox screen + marijuana, amphetamines, opiates  


ECHO  


DVT prophylaxis


PT/OT 


bowel regimen  





case discussed and evaluated by supervising physician





Subjective


Allergies:  


Coded Allergies:  


     SULFUR (Unverified  Allergy, Unknown, unk, 1/7/17)


 Per patient allergic to sulfur


Subjective


afebrile, leukocytosis resolved 


creat up to 5.1





Objective





Last 24 Hour Vital Signs








  Date Time  Temp Pulse Resp B/P Pulse Ox O2 Delivery O2 Flow Rate FiO2


 


1/8/17 12:00 98.4 63 18 160/78 98 Room Air  


 


1/8/17 08:00 98.2 63 16 146/78 97 Room Air  


 


1/8/17 00:00 97.9 71 18 109/64 94 Room Air  


 


1/7/17 19:00 98.2 70 20 134/70 97 Room Air  


 


1/7/17 15:47 98.8 65 20 139/80 98 Room Air  

















Intake and Output  


 


 1/7/17 1/8/17





 19:00 07:00


 


Intake Total 1250 ml 1220 ml


 


Output Total 250 ml 300 ml


 


Balance 1000 ml 920 ml


 


  


 


Intake Oral 50 ml 320 ml


 


IV Total 1200 ml 900 ml


 


Output Urine Total 250 ml 300 ml


 


# Voids  3








Objective


General: NAD, A/A/O x 3 male 


HEENT: NC/AT, EOMI PERRLA, OP moist


Respiratory/Chest:  chest wall non-tender, lungs clear, no respiratory distress

, no accessory muscle use


Cardiovascular:  normal peripheral pulses, normal rate, regular rhythm


Abdomen:  normal bowel sounds, soft, non tender, non distended


Extremities:  no cyanosis, no clubbing, other -  left leg edema and erythema 

improved.  blister drained.


Skin:   left leg edema and erythema improved.  blister drained., multiple all 

over the body tattoos


Neurologic/Psychiatric:  CNs II-XII grossly normal, no motor/sensory deficits, 

alert, oriented x 3, responsive


Lymphatic:  no neck adenopathy


Musculoskeletal:  normal muscle bulk





Microbiology








 Date/Time


Source Procedure


Growth Status


 


 


 1/7/17 17:05


Urine,Clean Catch Urine Culture - Preliminary


NO GROWTH Resulted


 


 1/6/17 05:00


Urine,Clean Catch Urine Culture - Preliminary


NO GROWTH AFTER 24 HOURS Resulted








Laboratory Tests


1/8/17 08:15: 


White Blood Count 10.5, Red Blood Count 3.89L, Hemoglobin 11.3L, Hematocrit 

34.8L, Mean Corpuscular Volume 90, Mean Corpuscular Hemoglobin 28.9, Mean 

Corpuscular Hemoglobin Concent 32.3, Red Cell Distribution Width 11.3L, 

Platelet Count 252, Mean Platelet Volume 6.1L, Neutrophils (%) (Auto) 70.9, 

Lymphocytes (%) (Auto) 12.3L, Monocytes (%) (Auto) 12.3H, Eosinophils (%) (Auto

) 3.0, Basophils (%) (Auto) 1.4, Sodium Level 131L, Potassium Level 3.6, 

Chloride Level 96L, Carbon Dioxide Level 21, Anion Gap 14, Blood Urea Nitrogen 

43H, Creatinine 5.1H, Estimat Glomerular Filtration Rate 14.9, Glucose Level 

146H, Uric Acid 8.3H, Calcium Level 7.5L, Phosphorus Level 3.6, Magnesium Level 

1.9, Total Bilirubin 0.3, Aspartate Amino Transf (AST/SGOT) 22, Alanine 

Aminotransferase (ALT/SGPT) 12, Alkaline Phosphatase 48, Total Creatine Kinase 

43, C-Reactive Protein, Quantitative 6.5H, Pro-B-Type Natriuretic Peptide 99890A

, Total Protein 5.3L, Albumin 2.1L, Globulin 3.2, Albumin/Globulin Ratio 0.6L





Current Medications








 Medications


  (Trade)  Dose


 Ordered  Sig/Kathleen


 Route


 PRN Reason  Start Time


 Stop Time Status Last Admin


Dose Admin


 


 Acetaminophen


  (Tylenol)  650 mg  Q4H  PRN


 ORAL


 fever  1/2/17 23:45


 2/1/17 23:44  1/7/17 02:46


 


 


 Ceftaroline


 Fosamil 300 mg/


 Sodium Chloride  50 ml @ 50


 mls/hr  Q12HR


 IV


   1/7/17 21:00


 1/14/17 20:59  1/8/17 09:45


 


 


 Dextrose


  (Dextrose 50%)    STAT  PRN


 IV


 Hypoglycemia  1/2/17 23:45


 2/1/17 23:44   


 


 


 Heparin Sodium


  (Porcine)


  (Heparin 5000


 units/ml)  5,000 units  EVERY 12  HOURS


 SUBQ


   1/3/17 09:00


 2/2/17 08:59  1/5/17 10:12


 


 


 Heparin Sodium/


 Sodium Chloride


  (Heparin 2000


 units/Ns 1000ml


 premix)  2,000 unit  ONCE


 INJ


   1/6/17 09:00


 2/5/17 08:59   


 


 


 Lidocaine HCl


  (Xylocaine 1%


 30ml)  30 ml  ONCE


 INJ


   1/6/17 09:00


 2/5/17 08:59   


 


 


 Lorazepam


  (Ativan 2mg/ml


 1ml)  0.5 mg  Q4H  PRN


 IV


 For Anxiety  1/2/17 23:45


 1/9/17 23:44   


 


 


 Morphine Sulfate


  (Morphine


 Sulfate)  4 mg  EVERY 4 HOURS  PRN


 IVP


 For Pain  1/3/17 16:00


 1/10/17 15:59  1/4/17 17:33


 


 


 Ondansetron HCl


  (Zofran)  4 mg  Q6H  PRN


 IVP


 Nausea & Vomiting  1/2/17 23:45


 2/1/17 23:44   


 


 


 Polyethylene


 Glycol


  (Miralax)  17 gm  HSPRN  PRN


 ORAL


 Constipation  1/2/17 23:45


 2/1/17 23:44   


 


 


 Sodium Chloride


  (Sodium Chloride


 1000ml bag)  1,000 ml @ 


 100 mls/hr  Q10H


 IV


   1/8/17 10:00


 2/7/17 09:59  1/8/17 11:09


 


 


 Tamsulosin HCl


 0.4 mg  0.4 mg  BID


 ORAL


   1/7/17 11:30


 2/6/17 11:29  1/8/17 09:44


 


 


 Zolpidem Tartrate


  (Ambien)  5 mg  HSPRN  PRN


 ORAL


 Insomnia  1/2/17 23:45


 2/1/17 23:44   


 

















Margarette Miranda NP ([facility]anchtein) Jan 8, 2017 12:43

## 2017-01-08 NOTE — GENERAL PROGRESS NOTE
Progress Note


Progress Note


Afebrile, no pain.  all wounds  on exam yesterday.  Ready for Rehab.











CARLI EWING Jan 8, 2017 12:04

## 2017-01-09 VITALS — DIASTOLIC BLOOD PRESSURE: 88 MMHG | SYSTOLIC BLOOD PRESSURE: 160 MMHG

## 2017-01-09 VITALS — DIASTOLIC BLOOD PRESSURE: 83 MMHG | SYSTOLIC BLOOD PRESSURE: 147 MMHG

## 2017-01-09 VITALS — SYSTOLIC BLOOD PRESSURE: 160 MMHG | DIASTOLIC BLOOD PRESSURE: 78 MMHG

## 2017-01-09 VITALS — DIASTOLIC BLOOD PRESSURE: 81 MMHG | SYSTOLIC BLOOD PRESSURE: 163 MMHG

## 2017-01-09 VITALS — SYSTOLIC BLOOD PRESSURE: 141 MMHG | DIASTOLIC BLOOD PRESSURE: 80 MMHG

## 2017-01-09 VITALS — SYSTOLIC BLOOD PRESSURE: 160 MMHG | DIASTOLIC BLOOD PRESSURE: 87 MMHG

## 2017-01-09 VITALS — DIASTOLIC BLOOD PRESSURE: 80 MMHG | SYSTOLIC BLOOD PRESSURE: 135 MMHG

## 2017-01-09 VITALS — DIASTOLIC BLOOD PRESSURE: 78 MMHG | SYSTOLIC BLOOD PRESSURE: 149 MMHG

## 2017-01-09 VITALS — SYSTOLIC BLOOD PRESSURE: 145 MMHG | DIASTOLIC BLOOD PRESSURE: 82 MMHG

## 2017-01-09 VITALS — SYSTOLIC BLOOD PRESSURE: 156 MMHG | DIASTOLIC BLOOD PRESSURE: 91 MMHG

## 2017-01-09 VITALS — SYSTOLIC BLOOD PRESSURE: 153 MMHG | DIASTOLIC BLOOD PRESSURE: 87 MMHG

## 2017-01-09 LAB
ALBUMIN/GLOB SERPL: 0.7 {RATIO} (ref 1–2.7)
ALT SERPL-CCNC: 16 U/L (ref 3–41)
AMORPH SED URNS QL MICRO: (no result) /LPF
ANION GAP SERPL CALC-SCNC: 17 MMOL/L (ref 5–15)
APPEARANCE UR: (no result)
APTT BLD: 34 SEC (ref 23–33)
AST SERPL-CCNC: 23 U/L (ref 5–40)
BACTERIA #/AREA URNS HPF: (no result) /HPF
BASOPHILS NFR BLD AUTO: 1.8 % (ref 0–2)
CALCIUM SERPL-MCNC: 7.7 MG/DL (ref 8.6–10.2)
CHLORIDE SERPL-SCNC: 98 MEQ/L (ref 98–107)
CO2 SERPL-SCNC: 19 MEQ/L (ref 20–30)
COARSE GRAN CASTS #/AREA URNS LPF: (no result) /LPF
CREAT SERPL-MCNC: 5.9 MG/DL (ref 0.7–1.2)
EOSINOPHIL NFR BLD AUTO: 4.8 % (ref 0–3)
ERYTHROCYTE [DISTWIDTH] IN BLOOD BY AUTOMATED COUNT: 11.3 % (ref 11.6–14.8)
GFR SERPLBLD BASED ON 1.73 SQ M-ARVRAT: 12.6 ML/MIN (ref 60–?)
GLOBULIN SER-MCNC: 3.1 G/DL
HEMOLYSIS: 5
INR PPP: 1.1 (ref 0.9–1.1)
KETONES UR QL STRIP: NEGATIVE
LEUKOCYTE ESTERASE UR QL STRIP: (no result)
LYMPHOCYTES NFR BLD AUTO: 15 % (ref 20–45)
MAGNESIUM SERPL-MCNC: 1.9 MG/DL (ref 1.7–2.5)
MCH RBC QN AUTO: 29.2 PG (ref 27–31)
MCHC RBC AUTO-ENTMCNC: 32.1 G/DL (ref 32–36)
MCV RBC AUTO: 91 FL (ref 80–99)
MONOCYTES NFR BLD AUTO: 15.2 % (ref 1–10)
NEUTROPHILS NFR BLD AUTO: 63.2 % (ref 45–75)
NITRITE UR QL STRIP: NEGATIVE
PH UR STRIP: 5 [PH] (ref 4.5–8)
PHOSPHATE SERPL-MCNC: 4.7 MG/DL (ref 2.5–4.8)
PLATELET # BLD: 321 K/UL (ref 150–450)
PMV BLD AUTO: 5.5 FL (ref 6.5–10.1)
POTASSIUM SERPL-SCNC: 3.6 MEQ/L (ref 3.4–4.9)
PROT SERPL-MCNC: 5.3 G/DL (ref 6.6–8.7)
PROT UR QL STRIP: (no result)
PROTHROMBIN TIME: 10.7 SEC (ref 9.3–11.5)
RBC # BLD AUTO: 3.53 M/UL (ref 4.7–6.1)
RBC #/AREA URNS HPF: (no result) /HPF (ref 0–0)
SODIUM SERPL-SCNC: 134 MEQ/L (ref 135–145)
SP GR UR STRIP: 1.01 (ref 1–1.03)
SQUAMOUS #/AREA URNS LPF: (no result) /LPF
UROBILINOGEN UR-MCNC: NORMAL MG/DL (ref 0–1)
WBC # BLD AUTO: 9.5 K/UL (ref 4.8–10.8)
WBC #/AREA URNS HPF: (no result) /HPF (ref 0–0)

## 2017-01-09 PROCEDURE — B518ZZA FLUOROSCOPY OF SUPERIOR VENA CAVA, GUIDANCE: ICD-10-PCS

## 2017-01-09 PROCEDURE — 02HV33Z INSERTION OF INFUSION DEVICE INTO SUPERIOR VENA CAVA, PERCUTANEOUS APPROACH: ICD-10-PCS

## 2017-01-09 PROCEDURE — 5A1D60Z: ICD-10-PCS

## 2017-01-09 RX ADMIN — HEPARIN SODIUM SCH UNITS: 5000 INJECTION INTRAVENOUS; SUBCUTANEOUS at 21:00

## 2017-01-09 RX ADMIN — TAMSULOSIN HYDROCHLORIDE SCH MG: 0.4 CAPSULE ORAL at 18:00

## 2017-01-09 RX ADMIN — SODIUM CHLORIDE SCH MLS/HR: 0.9 INJECTION INTRAVENOUS at 22:31

## 2017-01-09 RX ADMIN — SODIUM CHLORIDE SCH MLS/HR: 0.9 INJECTION INTRAVENOUS at 09:34

## 2017-01-09 RX ADMIN — TAMSULOSIN HYDROCHLORIDE SCH MG: 0.4 CAPSULE ORAL at 09:35

## 2017-01-09 RX ADMIN — HEPARIN SODIUM SCH UNITS: 5000 INJECTION INTRAVENOUS; SUBCUTANEOUS at 09:00

## 2017-01-09 NOTE — DIAGNOSTIC IMAGING REPORT
Indication: Patient requires hemodialysis.



Findings: After the indications, procedure, risks, complications, and alternatives

of the procedure were explained, written informed consent was obtained.



The neck was prepped with alcohol.  All elements of maximal sterile barrier

technique were followed including usage of a cap, mask, sterile gown, sterile

gloves, hand hygiene and a large sterile sheet.



1% lidocaine was used to anesthetize the skin.  Sonographic evaluation of the neck

was performed demonstrating a patent and compressible jugular vein.  Access was

obtained under real-time ultrasound guidance using an 18 gauge needle and a digital

image was saved in archive. An 035 wire was then advanced into the vein.  

Needle

exchanged or a dilator. A temporary hemodialysis catheter (12.5 [place]) was then

advanced over the wire such that the tip resides in the SVC. Wire was removed.

Catheter was secured to the skin using 2-0 Prolene suture. Both ports aspirate and

flush easily.



Fluoroscopic imaging was utilized to negotiate the 035 wire into the IVC.  Final

position of the hemodialysis catheter was confirmed by fluoroscopy.  Fluoroscopic

time 0.5 minutes.



The catheter is cleared for use.



Impression: Successful placement of right  jugular hemodialysis catheter.

## 2017-01-09 NOTE — GENERAL PROGRESS NOTE
Progress Note


Progress Note


Afebrile. Left leg wound is healing, ROM in left foot is intact. Creatine was 

elevated to 5.9. He underwent placement of a dialysis catheter today. He is 

stable from a surgical standpoint.











Josafat Rain MD Jan 9, 2017 12:33

## 2017-01-09 NOTE — PULMONOLOGY PROGRESS NOTE
Assessment/Plan


Problems:  


(1) Sepsis


(2) Cellulitis


(3) ATN (acute tubular necrosis)


Assessment/Plan


being dialyzed


legs looks much better


continue antibiotics


surgical evaluation appreciated,





Subjective


ROS Limited/Unobtainable:  No


Constitutional:  Reports: no symptoms


HEENT:  Repors: no symptoms


Respiratory:  Reports: no symptoms


Allergies:  


Coded Allergies:  


     SULFUR (Unverified  Allergy, Unknown, unk, 1/7/17)


 Per patient allergic to sulfur





Objective





Last 24 Hour Vital Signs








  Date Time  Temp Pulse Resp B/P Pulse Ox O2 Delivery O2 Flow Rate FiO2


 


1/9/17 13:00      Room Air  


 


1/9/17 12:45 98.6 71 20 163/81 96 Room Air  


 


1/9/17 11:39 97.5 63 19 149/78 96 Room Air  


 


1/9/17 10:45  66 13 153/87 98 Room Air  


 


1/9/17 10:40  67 14 145/82 94 Room Air  


 


1/9/17 10:35  67 16 160/88 98 Room Air  


 


1/9/17 10:31  65 16     


 


1/9/17 08:39 97.0 66 19 141/80 97 Room Air  


 


1/9/17 04:00 98.2 72 21 147/83 96 Room Air  


 


1/9/17 00:00 97.7 72 20 135/80 95 Room Air  


 


1/8/17 19:00 97.7 77 20 145/80 97 Room Air  

















Intake and Output  


 


 1/8/17 1/9/17





 19:00 07:00


 


Intake Total 250 ml 920 ml


 


Output Total  250 ml


 


Balance 250 ml 670 ml


 


  


 


Intake Oral 100 ml 120 ml


 


IV Total 150 ml 800 ml


 


Output Urine Total  250 ml


 


# Voids 1 3


 


# Bowel Movements 1 








General Appearance:  WD/WN


HEENT:  normocephalic


Respiratory/Chest:  chest wall non-tender, lungs clear


Cardiovascular:  normal peripheral pulses, normal rate


Abdomen:  normal bowel sounds


Extremities:  no cyanosis


Skin:  no rash


Lymphatic:  no neck adenopathy





Microbiology








 Date/Time


Source Procedure


Growth Status


 


 


 1/7/17 17:05


Urine,Clean Catch Urine Culture - Preliminary


NO GROWTH AFTER 24 HOURS Resulted








Laboratory Tests


1/9/17 06:00: 


White Blood Count 9.5, Red Blood Count 3.53L, Hemoglobin 10.3L, Hematocrit 32.1L

, Mean Corpuscular Volume 91, Mean Corpuscular Hemoglobin 29.2, Mean 

Corpuscular Hemoglobin Concent 32.1, Red Cell Distribution Width 11.3L, 

Platelet Count 321, Mean Platelet Volume 5.5L, Neutrophils (%) (Auto) 63.2, 

Lymphocytes (%) (Auto) 15.0L, Monocytes (%) (Auto) 15.2H, Eosinophils (%) (Auto

) 4.8H, Basophils (%) (Auto) 1.8, Prothrombin Time 10.7, Prothromb Time 

International Ratio 1.1, Activated Partial Thromboplast Time 34H, Sodium Level 

134L, Potassium Level 3.6, Chloride Level 98, Carbon Dioxide Level 19L, Anion 

Gap 17H, Blood Urea Nitrogen 46H, Creatinine 5.9H, Estimat Glomerular 

Filtration Rate 12.6, Glucose Level 98, Calcium Level 7.7L, Phosphorus Level 4.7

, Magnesium Level 1.9, Total Bilirubin 0.2, Aspartate Amino Transf (AST/SGOT) 23

, Alanine Aminotransferase (ALT/SGPT) 16, Alkaline Phosphatase 50, Pro-B-Type 

Natriuretic Peptide 9182H, Total Protein 5.3L, Albumin 2.2L, Globulin 3.1, 

Albumin/Globulin Ratio 0.7L





Current Medications








 Medications


  (Trade)  Dose


 Ordered  Sig/Kathleen


 Route


 PRN Reason  Start Time


 Stop Time Status Last Admin


Dose Admin


 


 Acetaminophen


  (Tylenol)  650 mg  Q4H  PRN


 ORAL


 fever  1/2/17 23:45


 2/1/17 23:44  1/7/17 02:46


 


 


 Ceftaroline


 Fosamil 300 mg/


 Sodium Chloride  50 ml @ 50


 mls/hr  Q12HR


 IV


   1/7/17 21:00


 1/14/17 20:59  1/9/17 09:34


 


 


 Dextrose


  (Dextrose 50%)    STAT  PRN


 IV


 Hypoglycemia  1/2/17 23:45


 2/1/17 23:44   


 


 


 Heparin Sodium


  (Porcine)


  (Heparin 5000


 units/ml)  5,000 units  EVERY 12  HOURS


 SUBQ


   1/3/17 09:00


 2/2/17 08:59  1/5/17 10:12


 


 


 Heparin Sodium


  (Porcine)


  (Heparin Sod


 1000 units/ml


 10ml)  1,000 unit  ONCE


 INJ


   1/9/17 10:30


 1/9/17 23:59   


 


 


 Heparin Sodium/


 Sodium Chloride


  (Heparin 2000


 units/Ns 1000ml


 premix)  2,000 unit  ONCE


 INJ


   1/9/17 10:30


 1/9/17 23:59   


 


 


 Lidocaine HCl


  (Xylocaine 1%


 30ml)  30 ml  ONCE


 INJ


   1/9/17 10:30


 1/9/17 23:59   


 


 


 Lorazepam


  (Ativan 2mg/ml


 1ml)  0.5 mg  Q4H  PRN


 IV


 For Anxiety  1/2/17 23:45


 1/9/17 23:44   


 


 


 Morphine Sulfate


  (Morphine


 Sulfate)  4 mg  EVERY 4 HOURS  PRN


 IVP


 For Pain  1/3/17 16:00


 1/10/17 15:59  1/4/17 17:33


 


 


 Ondansetron HCl


  (Zofran)  4 mg  Q6H  PRN


 IVP


 Nausea & Vomiting  1/2/17 23:45


 2/1/17 23:44   


 


 


 Polyethylene


 Glycol


  (Miralax)  17 gm  HSPRN  PRN


 ORAL


 Constipation  1/2/17 23:45


 2/1/17 23:44   


 


 


 Sodium Bicarbonate


  (Sodium


 Bicarbonate)  50 ml  ONCE


 IV


   1/9/17 10:30


 1/9/17 23:59   


 


 


 Sodium Chloride


  (Sodium Chloride


 1000ml bag)  1,000 ml @ 


 100 mls/hr  Q10H


 IV


   1/8/17 10:00


 2/7/17 09:59  1/9/17 05:53


 


 


 Tamsulosin HCl


 0.4 mg  0.4 mg  BID


 ORAL


   1/7/17 11:30


 2/6/17 11:29  1/9/17 09:35


 


 


 Zolpidem Tartrate


  (Ambien)  5 mg  HSPRN  PRN


 ORAL


 Insomnia  1/2/17 23:45


 2/1/17 23:44   


 

















GE WIN Jan 9, 2017 16:03

## 2017-01-09 NOTE — INFECTIOUS DISEASES PROG NOTE
Assessment/Plan


Assessment/Plan





ASSESSMENT:   46-year-old male with:





Left lower extremity cellulitis - improved significantly 


   MRI : no evidence of associated abscess, fasciitis or osteomyelitis


   Doppler : No DVT 


Fever - resolved 


Leucocytosis - resolved 








ARF SP rupinder 1/9, initiated HD


   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 Medical renal disease suspected.


Polysubstance abuse - UDS(+) Amphetamines, opiates, marijuana, tobacco


Sulfa allergy


Full Code








PLAN:





Continue  Ceftaroline  d# 4 / 7


 ( 1/5 SP on  cefepime and  vancomycin d# 4 ) 


Monitor CBC, temperatures


Monitor BMP.


HD prn





Subjective


Allergies:  


Coded Allergies:  


     SULFUR (Unverified  Allergy, Unknown, unk, 1/7/17)


 Per patient allergic to sulfur


Subjective





remains afebrile. improved


HD cath placed





Objective


Vital Signs





Last 24 Hour Vital Signs








  Date Time  Temp Pulse Resp B/P Pulse Ox O2 Delivery O2 Flow Rate FiO2


 


1/9/17 16:17      Room Air  


 


1/9/17 16:16 98.6 92 18 160/78 94 Room Air  


 


1/9/17 16:06 98.4 70 18 160/87 90 Room Air  


 


1/9/17 13:00      Room Air  


 


1/9/17 12:45 98.6 71 20 163/81 96 Room Air  


 


1/9/17 11:39 97.5 63 19 149/78 96 Room Air  


 


1/9/17 10:45  66 13 153/87 98 Room Air  


 


1/9/17 10:40  67 14 145/82 94 Room Air  


 


1/9/17 10:35  67 16 160/88 98 Room Air  


 


1/9/17 10:31  65 16     


 


1/9/17 08:39 97.0 66 19 141/80 97 Room Air  


 


1/9/17 04:00 98.2 72 21 147/83 96 Room Air  


 


1/9/17 00:00 97.7 72 20 135/80 95 Room Air  


 


1/8/17 19:00 97.7 77 20 145/80 97 Room Air  








Height (Feet):  6


Height (Inches):  1.00


Weight (Pounds):  200


General Appearance:  no acute distress


Respiratory/Chest:  no respiratory distress


Cardiovascular:  normal rate, regular rhythm


Abdomen:  normal bowel sounds, soft, non tender, non distended





Microbiology








 Date/Time


Source Procedure


Growth Status


 


 


 1/7/17 17:05


Urine,Clean Catch Urine Culture - Preliminary


NO GROWTH AFTER 24 HOURS Resulted











Laboratory Tests








Test


  1/9/17


06:00


 


White Blood Count


  9.5 K/UL


(4.8-10.8)


 


Red Blood Count


  3.53 M/UL


(4.70-6.10)  L


 


Hemoglobin


  10.3 G/DL


(14.2-18.0)  L


 


Hematocrit


  32.1 %


(42.0-52.0)  L


 


Mean Corpuscular Volume 91 FL (80-99)  


 


Mean Corpuscular Hemoglobin


  29.2 PG


(27.0-31.0)


 


Mean Corpuscular Hemoglobin


Concent 32.1 G/DL


(32.0-36.0)


 


Red Cell Distribution Width


  11.3 %


(11.6-14.8)  L


 


Platelet Count


  321 K/UL


(150-450)


 


Mean Platelet Volume


  5.5 FL


(6.5-10.1)  L


 


Neutrophils (%) (Auto)


  63.2 %


(45.0-75.0)


 


Lymphocytes (%) (Auto)


  15.0 %


(20.0-45.0)  L


 


Monocytes (%) (Auto)


  15.2 %


(1.0-10.0)  H


 


Eosinophils (%) (Auto)


  4.8 %


(0.0-3.0)  H


 


Basophils (%) (Auto)


  1.8 %


(0.0-2.0)


 


Prothrombin Time


  10.7 SEC


(9.30-11.50)


 


Prothromb Time International


Ratio 1.1 (0.9-1.1)  


 


 


Activated Partial


Thromboplast Time 34 SEC (23-33)


H


 


Sodium Level


  134 mEQ/L


(135-145)  L


 


Potassium Level


  3.6 mEQ/L


(3.4-4.9)


 


Chloride Level


  98 mEQ/L


()


 


Carbon Dioxide Level


  19 mEQ/L


(20-30)  L


 


Anion Gap 17 (5-15)  H


 


Blood Urea Nitrogen


  46 mg/dL


(7-23)  H


 


Creatinine


  5.9 mg/dL


(0.7-1.2)  H


 


Estimat Glomerular Filtration


Rate 12.6 mL/min


(>60)


 


Glucose Level


  98 mg/dL


()


 


Calcium Level


  7.7 mg/dL


(8.6-10.2)  L


 


Phosphorus Level


  4.7 mg/dL


(2.5-4.8)


 


Magnesium Level


  1.9 mg/dL


(1.7-2.5)


 


Total Bilirubin


  0.2 mg/dL


(0.0-1.2)


 


Aspartate Amino Transf


(AST/SGOT) 23 U/L (5-40)  


 


 


Alanine Aminotransferase


(ALT/SGPT) 16 U/L (3-41)  


 


 


Alkaline Phosphatase


  50 U/L


()


 


Pro-B-Type Natriuretic Peptide


  9182 pg/mL


(0-125)  H


 


Total Protein


  5.3 g/dL


(6.6-8.7)  L


 


Albumin


  2.2 g/dL


(3.5-5.2)  L


 


Globulin 3.1 g/dL  


 


Albumin/Globulin Ratio


  0.7 (1.0-2.7)


L











Current Medications








 Medications


  (Trade)  Dose


 Ordered  Sig/Kathleen


 Route


 PRN Reason  Start Time


 Stop Time Status Last Admin


Dose Admin


 


 Acetaminophen


  (Tylenol)  650 mg  Q4H  PRN


 ORAL


 fever  1/2/17 23:45


 2/1/17 23:44  1/7/17 02:46


 


 


 Ceftaroline


 Fosamil 300 mg/


 Sodium Chloride  50 ml @ 50


 mls/hr  Q12HR


 IV


   1/7/17 21:00


 1/14/17 20:59  1/9/17 09:34


 


 


 Dextrose


  (Dextrose 50%)    STAT  PRN


 IV


 Hypoglycemia  1/2/17 23:45


 2/1/17 23:44   


 


 


 Heparin Sodium


  (Porcine)


  (Heparin 5000


 units/ml)  5,000 units  EVERY 12  HOURS


 SUBQ


   1/3/17 09:00


 2/2/17 08:59  1/5/17 10:12


 


 


 Heparin Sodium


  (Porcine)


  (Heparin Sod


 1000 units/ml


 10ml)  1,000 unit  ONCE


 INJ


   1/9/17 10:30


 1/9/17 23:59   


 


 


 Heparin Sodium/


 Sodium Chloride


  (Heparin 2000


 units/Ns 1000ml


 premix)  2,000 unit  ONCE


 INJ


   1/9/17 10:30


 1/9/17 23:59   


 


 


 Lidocaine HCl


  (Xylocaine 1%


 30ml)  30 ml  ONCE


 INJ


   1/9/17 10:30


 1/9/17 23:59   


 


 


 Lorazepam


  (Ativan 2mg/ml


 1ml)  0.5 mg  Q4H  PRN


 IV


 For Anxiety  1/2/17 23:45


 1/9/17 23:44   


 


 


 Morphine Sulfate


  (Morphine


 Sulfate)  4 mg  EVERY 4 HOURS  PRN


 IVP


 For Pain  1/3/17 16:00


 1/10/17 15:59  1/4/17 17:33


 


 


 Ondansetron HCl


  (Zofran)  4 mg  Q6H  PRN


 IVP


 Nausea & Vomiting  1/2/17 23:45


 2/1/17 23:44   


 


 


 Polyethylene


 Glycol


  (Miralax)  17 gm  HSPRN  PRN


 ORAL


 Constipation  1/2/17 23:45


 2/1/17 23:44   


 


 


 Sodium Bicarbonate


  (Sodium


 Bicarbonate)  50 ml  ONCE


 IV


   1/9/17 10:30


 1/9/17 23:59   


 


 


 Sodium Chloride


  (Sodium Chloride


 1000ml bag)  1,000 ml @ 


 100 mls/hr  Q10H


 IV


   1/8/17 10:00


 2/7/17 09:59  1/9/17 05:53


 


 


 Tamsulosin HCl


 0.4 mg  0.4 mg  BID


 ORAL


   1/7/17 11:30


 2/6/17 11:29  1/9/17 09:35


 


 


 Zolpidem Tartrate


  (Ambien)  5 mg  HSPRN  PRN


 ORAL


 Insomnia  1/2/17 23:45


 2/1/17 23:44   


 

















TANIA FRANCIS Jan 9, 2017 17:03

## 2017-01-09 NOTE — GENERAL PROGRESS NOTE
Assessment/Plan


Status:  deteriorating - from renal stand


Assessment/Plan


Status:





Cellulitis left leg- Sepsis, received Amikacin and Vanco in the past.


Acute renal failure-


HypoAlbuminemia , Proteinuria , ? NS


Multi drug abuse








Plan:





Hydrate- change IV to NS-


check 24 h urine for protein, pending


Add flomax


Vanco level- noted


DC antibiotics until dose adjusted


Urine studies- Urine Tox screen- MJ and Amphetamines


Monitor renal parameters- worsening


placement of dialysis catheter, and dialysis today- patient agreable


Per orders





Subjective


ROS Limited/Unobtainable:  No


Constitutional:  Reports: malaise


Allergies:  


Coded Allergies:  


     SULFUR (Unverified  Allergy, Unknown, unk, 1/7/17)


 Per patient allergic to sulfur





Objective





Last 24 Hour Vital Signs








  Date Time  Temp Pulse Resp B/P Pulse Ox O2 Delivery O2 Flow Rate FiO2


 


1/9/17 08:39 97.0 66 19 141/80 97 Room Air  


 


1/9/17 04:00 98.2 72 21 147/83 96 Room Air  


 


1/9/17 00:00 97.7 72 20 135/80 95 Room Air  


 


1/8/17 19:00 97.7 77 20 145/80 97 Room Air  


 


1/8/17 15:46 97.3 71 20 137/76 98 Room Air  


 


1/8/17 13:41    150/75    


 


1/8/17 12:00 98.4 63 18 160/78 98 Room Air  

















Intake and Output  


 


 1/8/17 1/9/17





 19:00 07:00


 


Intake Total 250 ml 920 ml


 


Output Total  250 ml


 


Balance 250 ml 670 ml


 


  


 


Intake Oral 100 ml 120 ml


 


IV Total 150 ml 800 ml


 


Output Urine Total  250 ml


 


# Voids 1 3


 


# Bowel Movements 1 








Laboratory Tests


1/9/17 06:00: 


White Blood Count 9.5, Red Blood Count 3.53L, Hemoglobin 10.3L, Hematocrit 32.1L

, Mean Corpuscular Volume 91, Mean Corpuscular Hemoglobin 29.2, Mean 

Corpuscular Hemoglobin Concent 32.1, Red Cell Distribution Width 11.3L, 

Platelet Count 321, Mean Platelet Volume 5.5L, Neutrophils (%) (Auto) 63.2, 

Lymphocytes (%) (Auto) 15.0L, Monocytes (%) (Auto) 15.2H, Eosinophils (%) (Auto

) 4.8H, Basophils (%) (Auto) 1.8, Prothrombin Time 10.7, Prothromb Time 

International Ratio 1.1, Activated Partial Thromboplast Time 34H, Sodium Level 

134L, Potassium Level 3.6, Chloride Level 98, Carbon Dioxide Level 19L, Anion 

Gap 17H, Blood Urea Nitrogen 46H, Creatinine 5.9H, Estimat Glomerular 

Filtration Rate 12.6, Glucose Level 98, Calcium Level 7.7L, Phosphorus Level 4.7

, Magnesium Level 1.9, Total Bilirubin 0.2, Aspartate Amino Transf (AST/SGOT) 23

, Alanine Aminotransferase (ALT/SGPT) 16, Alkaline Phosphatase 50, Pro-B-Type 

Natriuretic Peptide 9182H, Total Protein 5.3L, Albumin 2.2L, Globulin 3.1, 

Albumin/Globulin Ratio 0.7L


Height (Feet):  6


Height (Inches):  1.00


Weight (Pounds):  200


General Appearance:  no apparent distress, lethargic


Cardiovascular:  normal rate


Respiratory/Chest:  lungs clear


Abdomen:  soft


Objective


no change in PE











ALISON ROTHMAN Jan 9, 2017 10:38

## 2017-01-10 VITALS — SYSTOLIC BLOOD PRESSURE: 143 MMHG | DIASTOLIC BLOOD PRESSURE: 78 MMHG

## 2017-01-10 VITALS — DIASTOLIC BLOOD PRESSURE: 77 MMHG | SYSTOLIC BLOOD PRESSURE: 135 MMHG

## 2017-01-10 VITALS — SYSTOLIC BLOOD PRESSURE: 139 MMHG | DIASTOLIC BLOOD PRESSURE: 76 MMHG

## 2017-01-10 VITALS — DIASTOLIC BLOOD PRESSURE: 94 MMHG | SYSTOLIC BLOOD PRESSURE: 159 MMHG

## 2017-01-10 VITALS — SYSTOLIC BLOOD PRESSURE: 153 MMHG | DIASTOLIC BLOOD PRESSURE: 77 MMHG

## 2017-01-10 VITALS — SYSTOLIC BLOOD PRESSURE: 156 MMHG | DIASTOLIC BLOOD PRESSURE: 74 MMHG

## 2017-01-10 LAB
ALBUMIN/GLOB SERPL: 0.7 {RATIO} (ref 1–2.7)
ALT SERPL-CCNC: 12 U/L (ref 3–41)
ANION GAP SERPL CALC-SCNC: 12 MMOL/L (ref 5–15)
AST SERPL-CCNC: 18 U/L (ref 5–40)
BASOPHILS NFR BLD AUTO: 1 % (ref 0–2)
CALCIUM SERPL-MCNC: 7.4 MG/DL (ref 8.6–10.2)
CHLORIDE SERPL-SCNC: 101 MEQ/L (ref 98–107)
CO2 SERPL-SCNC: 25 MEQ/L (ref 20–30)
CREAT SERPL-MCNC: 4.5 MG/DL (ref 0.7–1.2)
CRP SERPL-MCNC: 4.4 MG/DL (ref ?–0.5)
EOSINOPHIL NFR BLD AUTO: 4.3 % (ref 0–3)
ERYTHROCYTE [DISTWIDTH] IN BLOOD BY AUTOMATED COUNT: 11.2 % (ref 11.6–14.8)
GFR SERPLBLD BASED ON 1.73 SQ M-ARVRAT: 17.2 ML/MIN (ref 60–?)
GLOBULIN SER-MCNC: 2.9 G/DL
HEMOLYSIS: 0
LYMPHOCYTES NFR BLD AUTO: 19.1 % (ref 20–45)
MAGNESIUM SERPL-MCNC: 1.9 MG/DL (ref 1.7–2.5)
MCH RBC QN AUTO: 29.4 PG (ref 27–31)
MCHC RBC AUTO-ENTMCNC: 32.8 G/DL (ref 32–36)
MCV RBC AUTO: 90 FL (ref 80–99)
MONOCYTES NFR BLD AUTO: 14.8 % (ref 1–10)
NEUTROPHILS NFR BLD AUTO: 60.8 % (ref 45–75)
PHOSPHATE SERPL-MCNC: 4.4 MG/DL (ref 2.5–4.8)
PLATELET # BLD: 360 K/UL (ref 150–450)
PMV BLD AUTO: 5.3 FL (ref 6.5–10.1)
POTASSIUM SERPL-SCNC: 3.8 MEQ/L (ref 3.4–4.9)
PROT SERPL-MCNC: 5.1 G/DL (ref 6.6–8.7)
RBC # BLD AUTO: 3.34 M/UL (ref 4.7–6.1)
SODIUM SERPL-SCNC: 138 MEQ/L (ref 135–145)
URATE SERPL-MCNC: 6.9 MG/DL (ref 3–7.5)
WBC # BLD AUTO: 8.6 K/UL (ref 4.8–10.8)

## 2017-01-10 RX ADMIN — SODIUM CHLORIDE SCH MLS/HR: 0.9 INJECTION INTRAVENOUS at 10:04

## 2017-01-10 RX ADMIN — HEPARIN SODIUM SCH UNITS: 5000 INJECTION INTRAVENOUS; SUBCUTANEOUS at 21:00

## 2017-01-10 RX ADMIN — SODIUM CHLORIDE SCH MLS/HR: 0.9 INJECTION INTRAVENOUS at 21:00

## 2017-01-10 RX ADMIN — HEPARIN SODIUM SCH UNITS: 5000 INJECTION INTRAVENOUS; SUBCUTANEOUS at 09:00

## 2017-01-10 RX ADMIN — TAMSULOSIN HYDROCHLORIDE SCH MG: 0.4 CAPSULE ORAL at 18:00

## 2017-01-10 RX ADMIN — TAMSULOSIN HYDROCHLORIDE SCH MG: 0.4 CAPSULE ORAL at 10:04

## 2017-01-10 NOTE — PULMONOLOGY PROGRESS NOTE
Assessment/Plan


Problems:  


(1) Sepsis


(2) Cellulitis


(3) ATN (acute tubular necrosis)


Assessment/Plan


afebrile


no new complains


being dialyzed


legs looks much better


continue antibiotics





Subjective


ROS Limited/Unobtainable:  No


Constitutional:  Reports: no symptoms


HEENT:  Repors: no symptoms


Respiratory:  Reports: no symptoms


Allergies:  


Coded Allergies:  


     SULFUR (Unverified  Allergy, Unknown, unk, 1/7/17)


 Per patient allergic to sulfur





Objective





Last 24 Hour Vital Signs








  Date Time  Temp Pulse Resp B/P Pulse Ox O2 Delivery O2 Flow Rate FiO2


 


1/10/17 12:00 98.0 71 19 159/94 97 Room Air  


 


1/10/17 08:00 98.2 75 19 139/76 93 Room Air  


 


1/10/17 04:00 97.0 76 18 156/74 95 Room Air  


 


1/10/17 00:00 98.1 75 18 153/77 94 Room Air  

















Intake and Output  


 


 1/9/17 1/10/17





 19:00 07:00


 


Intake Total 340 ml 1690 ml


 


Output Total 1100 ml 


 


Balance -760 ml 1690 ml


 


  


 


Intake Oral 240 ml 640 ml


 


IV Total 100 ml 1050 ml


 


Output Urine Total 0 ml 


 


Hemodialysis UF 1100 ml 








General Appearance:  WD/WN


HEENT:  normocephalic, atraumatic


Respiratory/Chest:  chest wall non-tender, lungs clear


Abdomen:  normal bowel sounds


Extremities:  no cyanosis


Skin:  no lesions





Microbiology








 Date/Time


Source Procedure


Growth Status


 


 


 1/9/17 17:40


Urine,Clean Catch Urine Culture - Preliminary


NO GROWTH Resulted


 


 1/7/17 17:05


Urine,Clean Catch Urine Culture - Final


NO GROWTH AFTER 48 HOURS Complete








Laboratory Tests


1/9/17 17:40: 


Urine Color Brown, Urine Appearance Slightly cloudy, Urine pH 5, Urine Specific 

Gravity 1.015, Urine Protein 4+H, Urine Glucose (UA) Negative, Urine Ketones 

Negative, Urine Occult Blood 5+H, Urine Nitrite Negative, Urine Bilirubin 

Negative, Urine Urobilinogen Normal, Urine Leukocyte Esterase 3+H, Urine RBC 20-

30H, Urine WBC 15-20H, Urine Squamous Epithelial Cells None, Urine Amorphous 

Sediment FewH, Urine Bacteria ModerateH, Urine Coarse Granular Casts 2-4H, 

Urine Random Sodium 10


1/10/17 05:00: 


White Blood Count 8.6, Red Blood Count 3.34L, Hemoglobin 9.8L, Hematocrit 30.0L

, Mean Corpuscular Volume 90, Mean Corpuscular Hemoglobin 29.4, Mean 

Corpuscular Hemoglobin Concent 32.8, Red Cell Distribution Width 11.2L, 

Platelet Count 360, Mean Platelet Volume 5.3L, Neutrophils (%) (Auto) 60.8, 

Lymphocytes (%) (Auto) 19.1L, Monocytes (%) (Auto) 14.8H, Eosinophils (%) (Auto

) 4.3H, Basophils (%) (Auto) 1.0, Urine Eosinophils None seen, Sodium Level 138

, Potassium Level 3.8, Chloride Level 101, Carbon Dioxide Level 25, Anion Gap 12

, Blood Urea Nitrogen 37H, Creatinine 4.5H, Estimat Glomerular Filtration Rate 

17.2, Glucose Level 98, Uric Acid 6.9, Calcium Level 7.4L, Phosphorus Level 4.4

, Magnesium Level 1.9, Total Bilirubin 0.2, Gamma Glutamyl Transpeptidase 22, 

Aspartate Amino Transf (AST/SGOT) 18, Alanine Aminotransferase (ALT/SGPT) 12, 

Alkaline Phosphatase 46, C-Reactive Protein, Quantitative 4.4H, Pro-B-Type 

Natriuretic Peptide 5200H, Total Protein 5.1L, Albumin 2.2L, Globulin 2.9, 

Albumin/Globulin Ratio 0.7L





Current Medications








 Medications


  (Trade)  Dose


 Ordered  Sig/Kathleen


 Route


 PRN Reason  Start Time


 Stop Time Status Last Admin


Dose Admin


 


 Acetaminophen


  (Tylenol)  650 mg  Q4H  PRN


 ORAL


 fever  1/2/17 23:45


 2/1/17 23:44  1/7/17 02:46


 


 


 Ceftaroline


 Fosamil/Dextrose


  (Teflaro/D5W


 50ml)  50 ml @ 50


 mls/hr  Q12HR


 IV


   1/10/17 21:00


 1/14/17 20:59   


 


 


 Dextrose


  (Dextrose 50%)    STAT  PRN


 IV


 Hypoglycemia  1/2/17 23:45


 2/1/17 23:44   


 


 


 Heparin Sodium


  (Porcine)


  (Heparin 5000


 units/ml)  5,000 units  EVERY 12  HOURS


 SUBQ


   1/3/17 09:00


 2/2/17 08:59  1/5/17 10:12


 


 


 Ondansetron HCl


  (Zofran)  4 mg  Q6H  PRN


 IVP


 Nausea & Vomiting  1/2/17 23:45


 2/1/17 23:44   


 


 


 Polyethylene


 Glycol


  (Miralax)  17 gm  HSPRN  PRN


 ORAL


 Constipation  1/2/17 23:45


 2/1/17 23:44   


 


 


 Sodium Chloride  1,000 ml @ 


 100 mls/hr  Q10H


 IV


   1/8/17 10:00


 2/7/17 09:59  1/10/17 12:41


 


 


 Tamsulosin HCl


 0.4 mg  0.4 mg  BID


 ORAL


   1/7/17 11:30


 2/6/17 11:29  1/10/17 10:04


 


 


 Zolpidem Tartrate


  (Ambien)  5 mg  HSPRN  PRN


 ORAL


 Insomnia  1/2/17 23:45


 2/1/17 23:44   


 

















GE WIN Delbert 10, 2017 16:48

## 2017-01-10 NOTE — DIAGNOSTIC IMAGING REPORT
--------------- APPROVED REPORT --------------





CPT Code: 52734



Present Symptoms

Lower Extremity Pain:  Left 

Lower Extremity Edema: Left  

Comments: Cellulitis





BILATERAL: Imaging reveals a patent deep venous system bilaterally. There is no evidence 

of thrombus within the femoral, popliteal or tibial segments. The greater saphenous veins 

are also within normal limits. Doppler indicates normal spontaneous flow within these 

segments.

## 2017-01-10 NOTE — GENERAL PROGRESS NOTE
Assessment/Plan


Status:  unchanged


Assessment/Plan


Status:





Cellulitis left leg- Sepsis, received Amikacin and Vanco in the past.


Acute renal failure-


HypoAlbuminemia , Proteinuria , ? NS


Multi drug abuse








Plan:





Hydrate- change IV to NS-


check 24 h urine for protein, les than 1 gram


On  flomax


Vanco level- noted





Urine studies- Urine Tox screen- MJ and Amphetamines


Monitor renal parameters- 


ialysed 1/9 due dialysis 1/11


Per orders





Subjective


ROS Limited/Unobtainable:  No


Constitutional:  Reports: malaise, weakness


Allergies:  


Coded Allergies:  


     SULFUR (Unverified  Allergy, Unknown, unk, 1/7/17)


 Per patient allergic to sulfur





Objective





Last 24 Hour Vital Signs








  Date Time  Temp Pulse Resp B/P Pulse Ox O2 Delivery O2 Flow Rate FiO2


 


1/10/17 08:00 98.2 75 19 139/76 93 Room Air  


 


1/10/17 04:00 97.0 76 18 156/74 95 Room Air  


 


1/10/17 00:00 98.1 75 18 153/77 94 Room Air  


 


1/9/17 16:17      Room Air  


 


1/9/17 16:16 98.6 92 18 160/78 94 Room Air  


 


1/9/17 16:06 98.4 70 18 160/87 90 Room Air  


 


1/9/17 13:00      Room Air  


 


1/9/17 12:45 98.6 71 20 163/81 96 Room Air  


 


1/9/17 11:39 97.5 63 19 149/78 96 Room Air  

















Intake and Output  


 


 1/9/17 1/10/17





 19:00 07:00


 


Intake Total 340 ml 1690 ml


 


Output Total 1100 ml 


 


Balance -760 ml 1690 ml


 


  


 


Intake Oral 240 ml 640 ml


 


IV Total 100 ml 1050 ml


 


Output Urine Total 0 ml 


 


Hemodialysis UF 1100 ml 








Laboratory Tests


1/9/17 17:40: 


Urine Color Brown, Urine Appearance Slightly cloudy, Urine pH 5, Urine Specific 

Gravity 1.015, Urine Protein 4+H, Urine Glucose (UA) Negative, Urine Ketones 

Negative, Urine Occult Blood 5+H, Urine Nitrite Negative, Urine Bilirubin 

Negative, Urine Urobilinogen Normal, Urine Leukocyte Esterase 3+H, Urine RBC 20-

30H, Urine WBC 15-20H, Urine Squamous Epithelial Cells None, Urine Amorphous 

Sediment FewH, Urine Bacteria ModerateH, Urine Coarse Granular Casts 2-4H, 

Urine Random Sodium 10


1/10/17 05:00: 


White Blood Count 8.6, Red Blood Count 3.34L, Hemoglobin 9.8L, Hematocrit 30.0L

, Mean Corpuscular Volume 90, Mean Corpuscular Hemoglobin 29.4, Mean 

Corpuscular Hemoglobin Concent 32.8, Red Cell Distribution Width 11.2L, 

Platelet Count 360, Mean Platelet Volume 5.3L, Neutrophils (%) (Auto) 60.8, 

Lymphocytes (%) (Auto) 19.1L, Monocytes (%) (Auto) 14.8H, Eosinophils (%) (Auto

) 4.3H, Basophils (%) (Auto) 1.0, Urine Eosinophils None seen, Sodium Level 138

, Potassium Level 3.8, Chloride Level 101, Carbon Dioxide Level 25, Anion Gap 12

, Blood Urea Nitrogen 37H, Creatinine 4.5H, Estimat Glomerular Filtration Rate 

17.2, Glucose Level 98, Uric Acid 6.9, Calcium Level 7.4L, Phosphorus Level 4.4

, Magnesium Level 1.9, Total Bilirubin 0.2, Gamma Glutamyl Transpeptidase 22, 

Aspartate Amino Transf (AST/SGOT) 18, Alanine Aminotransferase (ALT/SGPT) 12, 

Alkaline Phosphatase 46, C-Reactive Protein, Quantitative 4.4H, Pro-B-Type 

Natriuretic Peptide 5200H, Total Protein 5.1L, Albumin 2.2L, Globulin 2.9, 

Albumin/Globulin Ratio 0.7L


Height (Feet):  6


Height (Inches):  1.00


Weight (Pounds):  200


General Appearance:  no apparent distress


Objective


no change in PE











ALISON ROTHMAN Delbert 10, 2017 11:36

## 2017-01-10 NOTE — GENERAL SURGERY PROGRESS NOTE
General Surgery-Progress Note


Subjective


Symptoms:  improved


Additional Comments


patient seen and examined at bedside.  doing well.  had dialysis yesterday.  

states leg feels much better.  no n/v/f/c.





Objective





Last 24 Hour Vital Signs








  Date Time  Temp Pulse Resp B/P Pulse Ox O2 Delivery O2 Flow Rate FiO2


 


1/10/17 08:00 98.2 75 19 139/76 93 Room Air  


 


1/10/17 04:00 97.0 76 18 156/74 95 Room Air  


 


1/10/17 00:00 98.1 75 18 153/77 94 Room Air  


 


1/9/17 16:17      Room Air  


 


1/9/17 16:16 98.6 92 18 160/78 94 Room Air  


 


1/9/17 16:06 98.4 70 18 160/87 90 Room Air  


 


1/9/17 13:00      Room Air  


 


1/9/17 12:45 98.6 71 20 163/81 96 Room Air  


 


1/9/17 11:39 97.5 63 19 149/78 96 Room Air  








I&O











Intake and Output  


 


 1/9/17 1/10/17





 19:00 07:00


 


Intake Total 340 ml 1690 ml


 


Output Total 1100 ml 


 


Balance -760 ml 1690 ml


 


  


 


Intake Oral 240 ml 640 ml


 


IV Total 100 ml 1050 ml


 


Output Urine Total 0 ml 


 


Hemodialysis UF 1100 ml 








Wound:  clean, dry


Cardiovascular:  RSR


Respiratory:  clear


Abdomen:  non-tender


Extremities:  no edema





Laboratory Tests








Test


  1/9/17


17:40 1/10/17


05:00


 


Urine Color Brown   


 


Urine Appearance


  Slightly


cloudy 


 


 


Urine pH 5 (4.5-8.0)   


 


Urine Specific Gravity


  1.015


(1.005-1.035) 


 


 


Urine Protein


  4+ (NEGATIVE)


H 


 


 


Urine Glucose (UA)


  Negative


(NEGATIVE) 


 


 


Urine Ketones


  Negative


(NEGATIVE) 


 


 


Urine Occult Blood


  5+ (NEGATIVE)


H 


 


 


Urine Nitrite


  Negative


(NEGATIVE) 


 


 


Urine Bilirubin


  Negative


(NEGATIVE) 


 


 


Urine Urobilinogen


  Normal MG/DL


(0.0-1.0) 


 


 


Urine Leukocyte Esterase


  3+ (NEGATIVE)


H 


 


 


Urine RBC


  20-30 /HPF (0


- 0)  H 


 


 


Urine WBC


  15-20 /HPF (0


- 0)  H 


 


 


Urine Squamous Epithelial


Cells None /LPF


(NONE/OCC) 


 


 


Urine Amorphous Sediment


  Few /LPF


(NONE)  H 


 


 


Urine Bacteria


  Moderate /HPF


(NONE)  H 


 


 


Urine Coarse Granular Casts


  2-4 /LPF


(NONE)  H 


 


 


Urine Random Sodium 10 mmol/L   


 


White Blood Count


  


  8.6 K/UL


(4.8-10.8)


 


Red Blood Count


  


  3.34 M/UL


(4.70-6.10)  L


 


Hemoglobin


  


  9.8 G/DL


(14.2-18.0)  L


 


Hematocrit


  


  30.0 %


(42.0-52.0)  L


 


Mean Corpuscular Volume  90 FL (80-99)  


 


Mean Corpuscular Hemoglobin


  


  29.4 PG


(27.0-31.0)


 


Mean Corpuscular Hemoglobin


Concent 


  32.8 G/DL


(32.0-36.0)


 


Red Cell Distribution Width


  


  11.2 %


(11.6-14.8)  L


 


Platelet Count


  


  360 K/UL


(150-450)


 


Mean Platelet Volume


  


  5.3 FL


(6.5-10.1)  L


 


Neutrophils (%) (Auto)


  


  60.8 %


(45.0-75.0)


 


Lymphocytes (%) (Auto)


  


  19.1 %


(20.0-45.0)  L


 


Monocytes (%) (Auto)


  


  14.8 %


(1.0-10.0)  H


 


Eosinophils (%) (Auto)


  


  4.3 %


(0.0-3.0)  H


 


Basophils (%) (Auto)


  


  1.0 %


(0.0-2.0)


 


Urine Eosinophils  None seen  


 


Sodium Level


  


  138 mEQ/L


(135-145)


 


Potassium Level


  


  3.8 mEQ/L


(3.4-4.9)


 


Chloride Level


  


  101 mEQ/L


()


 


Carbon Dioxide Level


  


  25 mEQ/L


(20-30)


 


Anion Gap  12 (5-15)  


 


Blood Urea Nitrogen


  


  37 mg/dL


(7-23)  H


 


Creatinine


  


  4.5 mg/dL


(0.7-1.2)  H


 


Estimat Glomerular Filtration


Rate 


  17.2 mL/min


(>60)


 


Glucose Level


  


  98 mg/dL


()


 


Uric Acid


  


  6.9 mg/dL


(3.0-7.5)


 


Calcium Level


  


  7.4 mg/dL


(8.6-10.2)  L


 


Phosphorus Level


  


  4.4 mg/dL


(2.5-4.8)


 


Magnesium Level


  


  1.9 mg/dL


(1.7-2.5)


 


Total Bilirubin


  


  0.2 mg/dL


(0.0-1.2)


 


Gamma Glutamyl Transpeptidase  22 U/L (8-61)  


 


Aspartate Amino Transf


(AST/SGOT) 


  18 U/L (5-40)  


 


 


Alanine Aminotransferase


(ALT/SGPT) 


  12 U/L (3-41)  


 


 


Alkaline Phosphatase


  


  46 U/L


()


 


C-Reactive Protein,


Quantitative 


  4.4 mg/dL (<


0.5)  H


 


Pro-B-Type Natriuretic Peptide


  


  5200 pg/mL


(0-125)  H


 


Total Protein


  


  5.1 g/dL


(6.6-8.7)  L


 


Albumin


  


  2.2 g/dL


(3.5-5.2)  L


 


Globulin  2.9 g/dL  


 


Albumin/Globulin Ratio


  


  0.7 (1.0-2.7)


L











Assessment


Post-op Diagnosis


46 year old male with left lower extremity cellulitis that is now resolved.  

wounds clean and dry and leg recovering well.  renal function declined during 

hospital course and required dialysis yesterday.  will hopefully recover from 

renal aspect soon too.  otherwise no further surgical intervention necessary.  

leg looks much improved and small area of blister healing well.





Plan


Problems:  


(1) Cellulitis


(2) Sepsis











Jaiden Patel MD Delbert 10, 2017 11:30

## 2017-01-10 NOTE — DIAGNOSTIC IMAGING REPORT
Indications:  COUGH



Technique:  Portable AP chest



Findings:



Comparison:  None



Cardiac silhouette partially obscured. Pulmonary vascular redistribution, 

bilateral

interstitial infiltrates, bibasal pleural effusions. PICC, hemodialysis catheter.



IMPRESSION:



Congestive heart failure



Lines and tubes as described

## 2017-01-10 NOTE — INFECTIOUS DISEASES PROG NOTE
Assessment/Plan


Assessment/Plan





ASSESSMENT:   46-year-old male with:





Left lower extremity cellulitis - improved significantly 


   MRI : no evidence of associated abscess, fasciitis or osteomyelitis


   Doppler : No DVT 


Fever - resolved 


Leucocytosis - resolved 








ARF SP rupinder 1/9, initiated HD


   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 Medical renal disease suspected.


Polysubstance abuse - UDS(+) Amphetamines, opiates, marijuana, tobacco


Sulfa allergy


Full Code








PLAN:





Continue  Ceftaroline  d# 5 / 7


 ( 1/5 SP on  cefepime and  vancomycin d# 4 ) 


Monitor CBC, temperatures


Monitor BMP.


HD prn





Subjective


Allergies:  


Coded Allergies:  


     SULFUR (Unverified  Allergy, Unknown, unk, 1/7/17)


 Per patient allergic to sulfur


Subjective





remains afebrile. improved





Objective


Vital Signs





Last 24 Hour Vital Signs








  Date Time  Temp Pulse Resp B/P Pulse Ox O2 Delivery O2 Flow Rate FiO2


 


1/10/17 12:00 98.0 71 19 159/94 97 Room Air  


 


1/10/17 08:00 98.2 75 19 139/76 93 Room Air  


 


1/10/17 04:00 97.0 76 18 156/74 95 Room Air  


 


1/10/17 00:00 98.1 75 18 153/77 94 Room Air  








Height (Feet):  6


Height (Inches):  1.00


Weight (Pounds):  200


General Appearance:  no acute distress


Respiratory/Chest:  no respiratory distress


Cardiovascular:  normal rate, regular rhythm


Abdomen:  normal bowel sounds, soft, non tender, non distended





Microbiology








 Date/Time


Source Procedure


Growth Status


 


 


 1/9/17 17:40


Urine,Clean Catch Urine Culture - Preliminary


NO GROWTH Resulted


 


 1/7/17 17:05


Urine,Clean Catch Urine Culture - Final


NO GROWTH AFTER 48 HOURS Complete











Laboratory Tests








Test


  1/9/17


17:40 1/10/17


05:00


 


Urine Color Brown   


 


Urine Appearance


  Slightly


cloudy 


 


 


Urine pH 5 (4.5-8.0)   


 


Urine Specific Gravity


  1.015


(1.005-1.035) 


 


 


Urine Protein


  4+ (NEGATIVE)


H 


 


 


Urine Glucose (UA)


  Negative


(NEGATIVE) 


 


 


Urine Ketones


  Negative


(NEGATIVE) 


 


 


Urine Occult Blood


  5+ (NEGATIVE)


H 


 


 


Urine Nitrite


  Negative


(NEGATIVE) 


 


 


Urine Bilirubin


  Negative


(NEGATIVE) 


 


 


Urine Urobilinogen


  Normal MG/DL


(0.0-1.0) 


 


 


Urine Leukocyte Esterase


  3+ (NEGATIVE)


H 


 


 


Urine RBC


  20-30 /HPF (0


- 0)  H 


 


 


Urine WBC


  15-20 /HPF (0


- 0)  H 


 


 


Urine Squamous Epithelial


Cells None /LPF


(NONE/OCC) 


 


 


Urine Amorphous Sediment


  Few /LPF


(NONE)  H 


 


 


Urine Bacteria


  Moderate /HPF


(NONE)  H 


 


 


Urine Coarse Granular Casts


  2-4 /LPF


(NONE)  H 


 


 


Urine Random Sodium 10 mmol/L   


 


White Blood Count


  


  8.6 K/UL


(4.8-10.8)


 


Red Blood Count


  


  3.34 M/UL


(4.70-6.10)  L


 


Hemoglobin


  


  9.8 G/DL


(14.2-18.0)  L


 


Hematocrit


  


  30.0 %


(42.0-52.0)  L


 


Mean Corpuscular Volume  90 FL (80-99)  


 


Mean Corpuscular Hemoglobin


  


  29.4 PG


(27.0-31.0)


 


Mean Corpuscular Hemoglobin


Concent 


  32.8 G/DL


(32.0-36.0)


 


Red Cell Distribution Width


  


  11.2 %


(11.6-14.8)  L


 


Platelet Count


  


  360 K/UL


(150-450)


 


Mean Platelet Volume


  


  5.3 FL


(6.5-10.1)  L


 


Neutrophils (%) (Auto)


  


  60.8 %


(45.0-75.0)


 


Lymphocytes (%) (Auto)


  


  19.1 %


(20.0-45.0)  L


 


Monocytes (%) (Auto)


  


  14.8 %


(1.0-10.0)  H


 


Eosinophils (%) (Auto)


  


  4.3 %


(0.0-3.0)  H


 


Basophils (%) (Auto)


  


  1.0 %


(0.0-2.0)


 


Urine Eosinophils  None seen  


 


Sodium Level


  


  138 mEQ/L


(135-145)


 


Potassium Level


  


  3.8 mEQ/L


(3.4-4.9)


 


Chloride Level


  


  101 mEQ/L


()


 


Carbon Dioxide Level


  


  25 mEQ/L


(20-30)


 


Anion Gap  12 (5-15)  


 


Blood Urea Nitrogen


  


  37 mg/dL


(7-23)  H


 


Creatinine


  


  4.5 mg/dL


(0.7-1.2)  H


 


Estimat Glomerular Filtration


Rate 


  17.2 mL/min


(>60)


 


Glucose Level


  


  98 mg/dL


()


 


Uric Acid


  


  6.9 mg/dL


(3.0-7.5)


 


Calcium Level


  


  7.4 mg/dL


(8.6-10.2)  L


 


Phosphorus Level


  


  4.4 mg/dL


(2.5-4.8)


 


Magnesium Level


  


  1.9 mg/dL


(1.7-2.5)


 


Total Bilirubin


  


  0.2 mg/dL


(0.0-1.2)


 


Gamma Glutamyl Transpeptidase  22 U/L (8-61)  


 


Aspartate Amino Transf


(AST/SGOT) 


  18 U/L (5-40)  


 


 


Alanine Aminotransferase


(ALT/SGPT) 


  12 U/L (3-41)  


 


 


Alkaline Phosphatase


  


  46 U/L


()


 


C-Reactive Protein,


Quantitative 


  4.4 mg/dL (<


0.5)  H


 


Pro-B-Type Natriuretic Peptide


  


  5200 pg/mL


(0-125)  H


 


Total Protein


  


  5.1 g/dL


(6.6-8.7)  L


 


Albumin


  


  2.2 g/dL


(3.5-5.2)  L


 


Globulin  2.9 g/dL  


 


Albumin/Globulin Ratio


  


  0.7 (1.0-2.7)


L











Current Medications








 Medications


  (Trade)  Dose


 Ordered  Sig/Kathleen


 Route


 PRN Reason  Start Time


 Stop Time Status Last Admin


Dose Admin


 


 Acetaminophen


  (Tylenol)  650 mg  Q4H  PRN


 ORAL


 fever  1/2/17 23:45


 2/1/17 23:44  1/7/17 02:46


 


 


 Ceftaroline


 Fosamil/Dextrose


  (Teflaro/D5W


 50ml)  50 ml @ 50


 mls/hr  Q12HR


 IV


   1/10/17 21:00


 1/14/17 20:59   


 


 


 Dextrose


  (Dextrose 50%)    STAT  PRN


 IV


 Hypoglycemia  1/2/17 23:45


 2/1/17 23:44   


 


 


 Heparin Sodium


  (Porcine)


  (Heparin 5000


 units/ml)  5,000 units  EVERY 12  HOURS


 SUBQ


   1/3/17 09:00


 2/2/17 08:59  1/5/17 10:12


 


 


 Ondansetron HCl


  (Zofran)  4 mg  Q6H  PRN


 IVP


 Nausea & Vomiting  1/2/17 23:45


 2/1/17 23:44   


 


 


 Polyethylene


 Glycol


  (Miralax)  17 gm  HSPRN  PRN


 ORAL


 Constipation  1/2/17 23:45


 2/1/17 23:44   


 


 


 Sodium Chloride  1,000 ml @ 


 100 mls/hr  Q10H


 IV


   1/8/17 10:00


 2/7/17 09:59  1/10/17 12:41


 


 


 Tamsulosin HCl


 0.4 mg  0.4 mg  BID


 ORAL


   1/7/17 11:30


 2/6/17 11:29  1/10/17 10:04


 


 


 Zolpidem Tartrate


  (Ambien)  5 mg  HSPRN  PRN


 ORAL


 Insomnia  1/2/17 23:45


 2/1/17 23:44   


 

















TANIA FRANCIS Delbert 10, 2017 16:46

## 2017-01-11 VITALS — DIASTOLIC BLOOD PRESSURE: 86 MMHG | SYSTOLIC BLOOD PRESSURE: 157 MMHG

## 2017-01-11 VITALS — DIASTOLIC BLOOD PRESSURE: 72 MMHG | SYSTOLIC BLOOD PRESSURE: 148 MMHG

## 2017-01-11 VITALS — SYSTOLIC BLOOD PRESSURE: 153 MMHG | DIASTOLIC BLOOD PRESSURE: 70 MMHG

## 2017-01-11 VITALS — SYSTOLIC BLOOD PRESSURE: 158 MMHG | DIASTOLIC BLOOD PRESSURE: 80 MMHG

## 2017-01-11 LAB
ALBUMIN/GLOB SERPL: 0.8 {RATIO} (ref 1–2.7)
ALT SERPL-CCNC: 14 U/L (ref 3–41)
ANION GAP SERPL CALC-SCNC: 12 MMOL/L (ref 5–15)
AST SERPL-CCNC: 21 U/L (ref 5–40)
BASOPHILS NFR BLD AUTO: 1 % (ref 0–2)
CALCIUM SERPL-MCNC: 7.6 MG/DL (ref 8.6–10.2)
CHLORIDE SERPL-SCNC: 104 MEQ/L (ref 98–107)
CO2 SERPL-SCNC: 24 MEQ/L (ref 20–30)
CREAT SERPL-MCNC: 5 MG/DL (ref 0.7–1.2)
CRP SERPL-MCNC: 4 MG/DL (ref ?–0.5)
EOSINOPHIL NFR BLD AUTO: 4 % (ref 0–3)
ERYTHROCYTE [DISTWIDTH] IN BLOOD BY AUTOMATED COUNT: 11.8 % (ref 11.6–14.8)
GFR SERPLBLD BASED ON 1.73 SQ M-ARVRAT: 15.3 ML/MIN (ref 60–?)
GLOBULIN SER-MCNC: 2.8 G/DL
HEMOLYSIS: 2
LYMPHOCYTES NFR BLD AUTO: 16.7 % (ref 20–45)
MAGNESIUM SERPL-MCNC: 2.1 MG/DL (ref 1.7–2.5)
MCH RBC QN AUTO: 28.5 PG (ref 27–31)
MCHC RBC AUTO-ENTMCNC: 30.6 G/DL (ref 32–36)
MCV RBC AUTO: 93 FL (ref 80–99)
MONOCYTES NFR BLD AUTO: 12.1 % (ref 1–10)
NEUTROPHILS NFR BLD AUTO: 66.2 % (ref 45–75)
PHOSPHATE SERPL-MCNC: 4.7 MG/DL (ref 2.5–4.8)
PLATELET # BLD: 390 K/UL (ref 150–450)
PMV BLD AUTO: 5.3 FL (ref 6.5–10.1)
POTASSIUM SERPL-SCNC: 3.8 MEQ/L (ref 3.4–4.9)
PROT SERPL-MCNC: 5.2 G/DL (ref 6.6–8.7)
RBC # BLD AUTO: 3.2 M/UL (ref 4.7–6.1)
SODIUM SERPL-SCNC: 140 MEQ/L (ref 135–145)
URATE SERPL-MCNC: 7.6 MG/DL (ref 3–7.5)
WBC # BLD AUTO: 9.3 K/UL (ref 4.8–10.8)

## 2017-01-11 RX ADMIN — SODIUM CHLORIDE SCH MLS/HR: 0.9 INJECTION INTRAVENOUS at 09:00

## 2017-01-11 RX ADMIN — MORPHINE SULFATE PRN MG: 4 INJECTION INTRAVENOUS at 22:17

## 2017-01-11 RX ADMIN — TAMSULOSIN HYDROCHLORIDE SCH MG: 0.4 CAPSULE ORAL at 18:58

## 2017-01-11 RX ADMIN — HEPARIN SODIUM SCH UNITS: 5000 INJECTION INTRAVENOUS; SUBCUTANEOUS at 21:50

## 2017-01-11 RX ADMIN — HEPARIN SODIUM SCH UNITS: 5000 INJECTION INTRAVENOUS; SUBCUTANEOUS at 09:00

## 2017-01-11 RX ADMIN — TAMSULOSIN HYDROCHLORIDE SCH MG: 0.4 CAPSULE ORAL at 09:00

## 2017-01-11 RX ADMIN — SODIUM CHLORIDE SCH MLS/HR: 0.9 INJECTION INTRAVENOUS at 21:50

## 2017-01-11 NOTE — INFECTIOUS DISEASES PROG NOTE
Assessment/Plan


Assessment/Plan





ASSESSMENT:   46-year-old male with:





Left lower extremity cellulitis - improved


   MRI : no evidence of associated abscess, fasciitis or osteomyelitis


   Doppler : No DVT 


Fever - resolved 


Leucocytosis - resolved 








ARF SP rupinder 1/9, initiated HD


   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 Medical renal disease suspected.


Polysubstance abuse - UDS(+) Amphetamines, opiates, marijuana, tobacco


Sulfa allergy


Full Code








PLAN:





Continue  Ceftaroline  d# 6 / 7


 ( 1/5 SP on  cefepime and  vancomycin d# 4 ) 


Monitor CBC, temperatures


Monitor BMP.


HD prn





Subjective


Allergies:  


Coded Allergies:  


     SULFUR (Unverified  Allergy, Unknown, unk, 1/7/17)


 Per patient allergic to sulfur


Subjective





remains afebrile. improved





Objective


Vital Signs





Last 24 Hour Vital Signs








  Date Time  Temp Pulse Resp B/P Pulse Ox O2 Delivery O2 Flow Rate FiO2


 


1/11/17 12:00 97.7 67 20 153/70 94 Room Air  


 


1/11/17 08:00 98.1 76 20 148/72 94 Room Air  


 


1/10/17 19:00 98.1 80 20 135/77 96 Room Air  


 


1/10/17 16:00 99.0 84 20 143/78  Room Air  








Height (Feet):  6


Height (Inches):  1.00


Weight (Pounds):  200


General Appearance:  no acute distress


Respiratory/Chest:  no respiratory distress


Cardiovascular:  normal rate, regular rhythm


Abdomen:  normal bowel sounds, soft, non tender, non distended





Microbiology








 Date/Time


Source Procedure


Growth Status


 


 


 1/9/17 17:40


Urine,Clean Catch Urine Culture - Preliminary


NO GROWTH AFTER 24 HOURS Resulted











Laboratory Tests








Test


  1/11/17


06:50 1/11/17


06:55


 


White Blood Count


  9.3 K/UL


(4.8-10.8) 


 


 


Red Blood Count


  3.20 M/UL


(4.70-6.10)  L 


 


 


Hemoglobin


  9.1 G/DL


(14.2-18.0)  L 


 


 


Hematocrit


  29.8 %


(42.0-52.0)  L 


 


 


Mean Corpuscular Volume 93 FL (80-99)   


 


Mean Corpuscular Hemoglobin


  28.5 PG


(27.0-31.0) 


 


 


Mean Corpuscular Hemoglobin


Concent 30.6 G/DL


(32.0-36.0)  L 


 


 


Red Cell Distribution Width


  11.8 %


(11.6-14.8) 


 


 


Platelet Count


  390 K/UL


(150-450) 


 


 


Mean Platelet Volume


  5.3 FL


(6.5-10.1)  L 


 


 


Neutrophils (%) (Auto)


  66.2 %


(45.0-75.0) 


 


 


Lymphocytes (%) (Auto)


  16.7 %


(20.0-45.0)  L 


 


 


Monocytes (%) (Auto)


  12.1 %


(1.0-10.0)  H 


 


 


Eosinophils (%) (Auto)


  4.0 %


(0.0-3.0)  H 


 


 


Basophils (%) (Auto)


  1.0 %


(0.0-2.0) 


 


 


Sodium Level


  140 mEQ/L


(135-145) 


 


 


Potassium Level


  3.8 mEQ/L


(3.4-4.9) 


 


 


Chloride Level


  104 mEQ/L


() 


 


 


Carbon Dioxide Level


  24 mEQ/L


(20-30) 


 


 


Anion Gap 12 (5-15)   


 


Blood Urea Nitrogen


  37 mg/dL


(7-23)  H 


 


 


Creatinine


  5.0 mg/dL


(0.7-1.2)  H 


 


 


Estimat Glomerular Filtration


Rate 15.3 mL/min


(>60) 


 


 


Glucose Level


  111 mg/dL


()  H 


 


 


Uric Acid


  7.6 mg/dL


(3.0-7.5)  H 


 


 


Calcium Level


  7.6 mg/dL


(8.6-10.2)  L 


 


 


Phosphorus Level


  4.7 mg/dL


(2.5-4.8) 


 


 


Magnesium Level


  2.1 mg/dL


(1.7-2.5) 


 


 


Total Bilirubin


  < 0.2 mg/dL


(0.0-1.2) 


 


 


Aspartate Amino Transf


(AST/SGOT) 21 U/L (5-40)  


  


 


 


Alanine Aminotransferase


(ALT/SGPT) 14 U/L (3-41)  


  


 


 


Alkaline Phosphatase


  51 U/L


() 


 


 


Total Creatine Kinase


  67 U/L


() 


 


 


C-Reactive Protein,


Quantitative 4.0 mg/dL (<


0.5)  H 


 


 


Pro-B-Type Natriuretic Peptide


  5526 pg/mL


(0-125)  H 


 


 


Total Protein


  5.2 g/dL


(6.6-8.7)  L 


 


 


Albumin


  2.4 g/dL


(3.5-5.2)  L 


 


 


Globulin 2.8 g/dL   


 


Albumin/Globulin Ratio


  0.8 (1.0-2.7)


L 


 


 


Urine Eosinophils  None seen  











Current Medications








 Medications


  (Trade)  Dose


 Ordered  Sig/Kathleen


 Route


 PRN Reason  Start Time


 Stop Time Status Last Admin


Dose Admin


 


 Acetaminophen


  (Tylenol)  650 mg  Q4H  PRN


 ORAL


 fever  1/2/17 23:45


 2/1/17 23:44  1/7/17 02:46


 


 


 Ceftaroline


 Fosamil/Dextrose


  (Teflaro/D5W


 50ml)  50 ml @ 50


 mls/hr  Q12HR


 IV


   1/10/17 21:00


 1/14/17 20:59   


 


 


 Dextrose


  (Dextrose 50%)    STAT  PRN


 IV


 Hypoglycemia  1/2/17 23:45


 2/1/17 23:44   


 


 


 Heparin Sodium


  (Porcine)


  (Heparin 5000


 units/ml)  5,000 units  EVERY 12  HOURS


 SUBQ


   1/3/17 09:00


 2/2/17 08:59  1/5/17 10:12


 


 


 Ondansetron HCl


  (Zofran)  4 mg  Q6H  PRN


 IVP


 Nausea & Vomiting  1/2/17 23:45


 2/1/17 23:44   


 


 


 Polyethylene


 Glycol


  (Miralax)  17 gm  HSPRN  PRN


 ORAL


 Constipation  1/2/17 23:45


 2/1/17 23:44   


 


 


 Sodium Chloride  1,000 ml @ 


 100 mls/hr  Q10H


 IV


   1/8/17 10:00


 2/7/17 09:59  1/10/17 12:41


 


 


 Tamsulosin HCl


 0.4 mg  0.4 mg  BID


 ORAL


   1/7/17 11:30


 2/6/17 11:29  1/10/17 10:04


 


 


 Zolpidem Tartrate


  (Ambien)  5 mg  HSPRN  PRN


 ORAL


 Insomnia  1/2/17 23:45


 2/1/17 23:44   


 

















TANIA FRANCIS Jan 11, 2017 15:55

## 2017-01-11 NOTE — GENERAL SURGERY PROGRESS NOTE
General Surgery-Progress Note


Subjective


Chief Complaint:  left lower leg wound much , some crust, but pain 

decreased.  HD done


Symptoms:  improved





Objective





Last 24 Hour Vital Signs








  Date Time  Temp Pulse Resp B/P Pulse Ox O2 Delivery O2 Flow Rate FiO2


 


1/11/17 08:00 98.1 76 20 148/72 94 Room Air  


 


1/10/17 19:00 98.1 80 20 135/77 96 Room Air  


 


1/10/17 16:00 99.0 84 20 143/78  Room Air  








I&O











Intake and Output  


 


 1/10/17 1/11/17





 19:00 07:00


 


Intake Total 940 ml 360 ml


 


Output Total 350 ml 450 ml


 


Balance 590 ml -90 ml


 


  


 


Intake Oral 240 ml 360 ml


 


IV Total 700 ml 


 


Output Urine Total 350 ml 450 ml


 


# Voids  5








Wound:  clean - rust in place, no bleeding or infection, edemaa subsiding


Drains:  none


Cardiovascular:  RSR


Respiratory:  clear


Abdomen:  soft


Extremities:  edema - 2+, much less tense





Laboratory Tests








Test


  1/11/17


06:50 1/11/17


06:55


 


White Blood Count


  9.3 K/UL


(4.8-10.8) 


 


 


Red Blood Count


  3.20 M/UL


(4.70-6.10)  L 


 


 


Hemoglobin


  9.1 G/DL


(14.2-18.0)  L 


 


 


Hematocrit


  29.8 %


(42.0-52.0)  L 


 


 


Mean Corpuscular Volume 93 FL (80-99)   


 


Mean Corpuscular Hemoglobin


  28.5 PG


(27.0-31.0) 


 


 


Mean Corpuscular Hemoglobin


Concent 30.6 G/DL


(32.0-36.0)  L 


 


 


Red Cell Distribution Width


  11.8 %


(11.6-14.8) 


 


 


Platelet Count


  390 K/UL


(150-450) 


 


 


Mean Platelet Volume


  5.3 FL


(6.5-10.1)  L 


 


 


Neutrophils (%) (Auto)


  66.2 %


(45.0-75.0) 


 


 


Lymphocytes (%) (Auto)


  16.7 %


(20.0-45.0)  L 


 


 


Monocytes (%) (Auto)


  12.1 %


(1.0-10.0)  H 


 


 


Eosinophils (%) (Auto)


  4.0 %


(0.0-3.0)  H 


 


 


Basophils (%) (Auto)


  1.0 %


(0.0-2.0) 


 


 


Sodium Level


  140 mEQ/L


(135-145) 


 


 


Potassium Level


  3.8 mEQ/L


(3.4-4.9) 


 


 


Chloride Level


  104 mEQ/L


() 


 


 


Carbon Dioxide Level


  24 mEQ/L


(20-30) 


 


 


Anion Gap 12 (5-15)   


 


Blood Urea Nitrogen


  37 mg/dL


(7-23)  H 


 


 


Creatinine


  5.0 mg/dL


(0.7-1.2)  H 


 


 


Estimat Glomerular Filtration


Rate 15.3 mL/min


(>60) 


 


 


Glucose Level


  111 mg/dL


()  H 


 


 


Uric Acid


  7.6 mg/dL


(3.0-7.5)  H 


 


 


Calcium Level


  7.6 mg/dL


(8.6-10.2)  L 


 


 


Phosphorus Level


  4.7 mg/dL


(2.5-4.8) 


 


 


Magnesium Level


  2.1 mg/dL


(1.7-2.5) 


 


 


Total Bilirubin


  < 0.2 mg/dL


(0.0-1.2) 


 


 


Aspartate Amino Transf


(AST/SGOT) 21 U/L (5-40)  


  


 


 


Alanine Aminotransferase


(ALT/SGPT) 14 U/L (3-41)  


  


 


 


Alkaline Phosphatase


  51 U/L


() 


 


 


Total Creatine Kinase


  67 U/L


() 


 


 


C-Reactive Protein,


Quantitative 4.0 mg/dL (<


0.5)  H 


 


 


Pro-B-Type Natriuretic Peptide


  5526 pg/mL


(0-125)  H 


 


 


Total Protein


  5.2 g/dL


(6.6-8.7)  L 


 


 


Albumin


  2.4 g/dL


(3.5-5.2)  L 


 


 


Globulin 2.8 g/dL   


 


Albumin/Globulin Ratio


  0.8 (1.0-2.7)


L 


 


 


Urine Eosinophils  None seen  








Additional Comments


S/P left medial lower leg severe cellulitis and bullous injury, r/o spider bite 

with toxin (brown recluse spider?) with acute renal  failure, cause undetermined


UTI resolving





Assessment


Additional Comments


Continue HD, elevation, local wound care.  Hopefully will have return of renal 

function











CARLI EWING Jan 11, 2017 12:19

## 2017-01-11 NOTE — GENERAL PROGRESS NOTE
Assessment/Plan


Status:  unchanged


Assessment/Plan


Status:





Cellulitis left leg- Sepsis, received Amikacin and Vanco in the past.


Acute renal failure-


HypoAlbuminemia , Proteinuria , ? NS


Multi drug abuse








Plan:





Hydrate- change IV to NS-


check 24 h urine for protein, les than 1 gram


On  flomax


Vanco level- noted





Urine studies- Urine Tox screen- MJ and Amphetamines


Monitor renal parameters- 


Dialysed 1/9 due dialysis 1/11 today


Per orders





Subjective


ROS Limited/Unobtainable:  No


Allergies:  


Coded Allergies:  


     SULFUR (Unverified  Allergy, Unknown, unk, 1/7/17)


 Per patient allergic to sulfur





Objective





Last 24 Hour Vital Signs








  Date Time  Temp Pulse Resp B/P Pulse Ox O2 Delivery O2 Flow Rate FiO2


 


1/11/17 08:00 98.1 76 20 148/72 94 Room Air  


 


1/10/17 19:00 98.1 80 20 135/77 96 Room Air  


 


1/10/17 16:00 99.0 84 20 143/78  Room Air  


 


1/10/17 12:00 98.0 71 19 159/94 97 Room Air  

















Intake and Output  


 


 1/10/17 1/11/17





 19:00 07:00


 


Intake Total 940 ml 360 ml


 


Output Total 350 ml 450 ml


 


Balance 590 ml -90 ml


 


  


 


Intake Oral 240 ml 360 ml


 


IV Total 700 ml 


 


Output Urine Total 350 ml 450 ml


 


# Voids  5








Laboratory Tests


1/11/17 06:50: 


White Blood Count 9.3, Red Blood Count 3.20L, Hemoglobin 9.1L, Hematocrit 29.8L

, Mean Corpuscular Volume 93, Mean Corpuscular Hemoglobin 28.5, Mean 

Corpuscular Hemoglobin Concent 30.6L, Red Cell Distribution Width 11.8, 

Platelet Count 390, Mean Platelet Volume 5.3L, Neutrophils (%) (Auto) 66.2, 

Lymphocytes (%) (Auto) 16.7L, Monocytes (%) (Auto) 12.1H, Eosinophils (%) (Auto

) 4.0H, Basophils (%) (Auto) 1.0, Sodium Level 140, Potassium Level 3.8, 

Chloride Level 104, Carbon Dioxide Level 24, Anion Gap 12, Blood Urea Nitrogen 

37H, Creatinine 5.0H, Estimat Glomerular Filtration Rate 15.3, Glucose Level 

111H, Uric Acid 7.6H, Calcium Level 7.6L, Phosphorus Level 4.7, Magnesium Level 

2.1, Total Bilirubin < 0.2, Aspartate Amino Transf (AST/SGOT) 21, Alanine 

Aminotransferase (ALT/SGPT) 14, Alkaline Phosphatase 51, Total Creatine Kinase 

67, C-Reactive Protein, Quantitative 4.0H, Pro-B-Type Natriuretic Peptide 5526H

, Total Protein 5.2L, Albumin 2.4L, Globulin 2.8, Albumin/Globulin Ratio 0.8L


1/11/17 06:55: Urine Eosinophils None seen


Height (Feet):  6


Height (Inches):  1.00


Weight (Pounds):  200


General Appearance:  no apparent distress


Objective


no change in PE











ALISON ROTHMAN Jan 11, 2017 10:17

## 2017-01-11 NOTE — PULMONOLOGY PROGRESS NOTE
Assessment/Plan


Problems:  


(1) Sepsis


(2) Cellulitis


(3) ATN (acute tubular necrosis)


Assessment/Plan


afebrile


no new complains


being dialyzed


legs looks much better


continue antibiotics





Subjective


ROS Limited/Unobtainable:  Yes


Constitutional:  Reports: anorexia, chills, fatigue, fever


Musculoskeletal:  Reports: pain, stiffness, swelling


Allergies:  


Coded Allergies:  


     SULFUR (Unverified  Allergy, Unknown, unk, 1/7/17)


 Per patient allergic to sulfur





Objective





Last 24 Hour Vital Signs








  Date Time  Temp Pulse Resp B/P Pulse Ox O2 Delivery O2 Flow Rate FiO2


 


1/11/17 15:56 98.1 79 20 158/80 95 Room Air  


 


1/11/17 15:30      Room Air  


 


1/11/17 12:00 97.7 67 20 153/70 94 Room Air  


 


1/11/17 08:00 98.1 76 20 148/72 94 Room Air  


 


1/10/17 19:00 98.1 80 20 135/77 96 Room Air  

















Intake and Output  


 


 1/10/17 1/11/17





 19:00 07:00


 


Intake Total 940 ml 360 ml


 


Output Total 350 ml 450 ml


 


Balance 590 ml -90 ml


 


  


 


Intake Oral 240 ml 360 ml


 


IV Total 700 ml 


 


Output Urine Total 350 ml 450 ml


 


# Voids  5








General Appearance:  no acute distress


HEENT:  normocephalic, atraumatic, PERRL


Respiratory/Chest:  chest wall non-tender, lungs clear, normal breath sounds


Cardiovascular:  normal peripheral pulses, normal rate, regular rhythm, no JVD


Abdomen:  normal bowel sounds, soft, non tender, no organomegaly, non distended


Genitourinary:  normal external genitalia


Extremities:  no cyanosis, no clubbing


Skin:  rash, lesions


Neurologic/Psychiatric:  CNs II-XII grossly normal, no motor/sensory deficits





Microbiology








 Date/Time


Source Procedure


Growth Status


 


 


 1/9/17 17:40


Urine,Clean Catch Urine Culture - Preliminary


NO GROWTH AFTER 24 HOURS Resulted








Laboratory Tests


1/11/17 06:50: 


White Blood Count 9.3, Red Blood Count 3.20L, Hemoglobin 9.1L, Hematocrit 29.8L

, Mean Corpuscular Volume 93, Mean Corpuscular Hemoglobin 28.5, Mean 

Corpuscular Hemoglobin Concent 30.6L, Red Cell Distribution Width 11.8, 

Platelet Count 390, Mean Platelet Volume 5.3L, Neutrophils (%) (Auto) 66.2, 

Lymphocytes (%) (Auto) 16.7L, Monocytes (%) (Auto) 12.1H, Eosinophils (%) (Auto

) 4.0H, Basophils (%) (Auto) 1.0, Sodium Level 140, Potassium Level 3.8, 

Chloride Level 104, Carbon Dioxide Level 24, Anion Gap 12, Blood Urea Nitrogen 

37H, Creatinine 5.0H, Estimat Glomerular Filtration Rate 15.3, Glucose Level 

111H, Uric Acid 7.6H, Calcium Level 7.6L, Phosphorus Level 4.7, Magnesium Level 

2.1, Total Bilirubin < 0.2, Aspartate Amino Transf (AST/SGOT) 21, Alanine 

Aminotransferase (ALT/SGPT) 14, Alkaline Phosphatase 51, Total Creatine Kinase 

67, C-Reactive Protein, Quantitative 4.0H, Pro-B-Type Natriuretic Peptide 5526H

, Total Protein 5.2L, Albumin 2.4L, Globulin 2.8, Albumin/Globulin Ratio 0.8L


1/11/17 06:55: Urine Eosinophils None seen





Current Medications








 Medications


  (Trade)  Dose


 Ordered  Sig/Kathleen


 Route


 PRN Reason  Start Time


 Stop Time Status Last Admin


Dose Admin


 


 Acetaminophen


  (Tylenol)  650 mg  Q4H  PRN


 ORAL


 fever  1/2/17 23:45


 2/1/17 23:44  1/7/17 02:46


 


 


 Ceftaroline


 Fosamil/Dextrose


  (Teflaro/D5W


 50ml)  50 ml @ 50


 mls/hr  Q12HR


 IV


   1/10/17 21:00


 1/14/17 20:59   


 


 


 Dextrose


  (Dextrose 50%)    STAT  PRN


 IV


 Hypoglycemia  1/2/17 23:45


 2/1/17 23:44   


 


 


 Heparin Sodium


  (Porcine)


  (Heparin 5000


 units/ml)  5,000 units  EVERY 12  HOURS


 SUBQ


   1/3/17 09:00


 2/2/17 08:59  1/5/17 10:12


 


 


 Ondansetron HCl


  (Zofran)  4 mg  Q6H  PRN


 IVP


 Nausea & Vomiting  1/2/17 23:45


 2/1/17 23:44   


 


 


 Polyethylene


 Glycol


  (Miralax)  17 gm  HSPRN  PRN


 ORAL


 Constipation  1/2/17 23:45


 2/1/17 23:44   


 


 


 Sodium Chloride  1,000 ml @ 


 100 mls/hr  Q10H


 IV


   1/8/17 10:00


 2/7/17 09:59  1/10/17 12:41


 


 


 Tamsulosin HCl


 0.4 mg  0.4 mg  BID


 ORAL


   1/7/17 11:30


 2/6/17 11:29  1/10/17 10:04


 


 


 Zolpidem Tartrate


  (Ambien)  5 mg  HSPRN  PRN


 ORAL


 Insomnia  1/2/17 23:45


 2/1/17 23:44   


 

















GE WIN Jan 11, 2017 18:14

## 2017-01-12 VITALS — DIASTOLIC BLOOD PRESSURE: 89 MMHG | SYSTOLIC BLOOD PRESSURE: 156 MMHG

## 2017-01-12 VITALS — SYSTOLIC BLOOD PRESSURE: 163 MMHG | DIASTOLIC BLOOD PRESSURE: 99 MMHG

## 2017-01-12 VITALS — DIASTOLIC BLOOD PRESSURE: 94 MMHG | SYSTOLIC BLOOD PRESSURE: 161 MMHG

## 2017-01-12 VITALS — SYSTOLIC BLOOD PRESSURE: 146 MMHG | DIASTOLIC BLOOD PRESSURE: 93 MMHG

## 2017-01-12 LAB
ALBUMIN/GLOB SERPL: 0.6 {RATIO} (ref 1–2.7)
ALT SERPL-CCNC: 14 U/L (ref 3–41)
ANION GAP SERPL CALC-SCNC: 11 MMOL/L (ref 5–15)
AST SERPL-CCNC: 21 U/L (ref 5–40)
BASOPHILS NFR BLD AUTO: 1 % (ref 0–2)
CALCIUM SERPL-MCNC: 7.7 MG/DL (ref 8.6–10.2)
CHLORIDE SERPL-SCNC: 103 MEQ/L (ref 98–107)
CO2 SERPL-SCNC: 25 MEQ/L (ref 20–30)
CREAT SERPL-MCNC: 4.7 MG/DL (ref 0.7–1.2)
EOSINOPHIL NFR BLD AUTO: 3.3 % (ref 0–3)
ERYTHROCYTE [DISTWIDTH] IN BLOOD BY AUTOMATED COUNT: 11.9 % (ref 11.6–14.8)
GFR SERPLBLD BASED ON 1.73 SQ M-ARVRAT: 16.4 ML/MIN (ref 60–?)
GLOBULIN SER-MCNC: 3.3 G/DL
HEMOLYSIS: 0
LYMPHOCYTES NFR BLD AUTO: 17 % (ref 20–45)
MAGNESIUM SERPL-MCNC: 1.9 MG/DL (ref 1.7–2.5)
MCH RBC QN AUTO: 28.9 PG (ref 27–31)
MCHC RBC AUTO-ENTMCNC: 32.2 G/DL (ref 32–36)
MCV RBC AUTO: 90 FL (ref 80–99)
MONOCYTES NFR BLD AUTO: 11.9 % (ref 1–10)
NEUTROPHILS NFR BLD AUTO: 66.8 % (ref 45–75)
PHOSPHATE SERPL-MCNC: 4.9 MG/DL (ref 2.5–4.8)
PLATELET # BLD: 397 K/UL (ref 150–450)
PMV BLD AUTO: 5.3 FL (ref 6.5–10.1)
POTASSIUM SERPL-SCNC: 3.9 MEQ/L (ref 3.4–4.9)
PROT SERPL-MCNC: 5.5 G/DL (ref 6.6–8.7)
RBC # BLD AUTO: 3.15 M/UL (ref 4.7–6.1)
SODIUM SERPL-SCNC: 139 MEQ/L (ref 135–145)
URATE SERPL-MCNC: 7.5 MG/DL (ref 3–7.5)
WBC # BLD AUTO: 9.8 K/UL (ref 4.8–10.8)

## 2017-01-12 RX ADMIN — SODIUM CHLORIDE SCH MLS/HR: 0.9 INJECTION INTRAVENOUS at 22:47

## 2017-01-12 RX ADMIN — MORPHINE SULFATE PRN MG: 4 INJECTION INTRAVENOUS at 10:10

## 2017-01-12 RX ADMIN — SODIUM CHLORIDE SCH MLS/HR: 0.9 INJECTION INTRAVENOUS at 10:15

## 2017-01-12 RX ADMIN — MORPHINE SULFATE PRN MG: 4 INJECTION INTRAVENOUS at 19:23

## 2017-01-12 RX ADMIN — HEPARIN SODIUM SCH UNITS: 5000 INJECTION INTRAVENOUS; SUBCUTANEOUS at 21:40

## 2017-01-12 RX ADMIN — TAMSULOSIN HYDROCHLORIDE SCH MG: 0.4 CAPSULE ORAL at 18:37

## 2017-01-12 RX ADMIN — HEPARIN SODIUM SCH UNITS: 5000 INJECTION INTRAVENOUS; SUBCUTANEOUS at 10:12

## 2017-01-12 RX ADMIN — MORPHINE SULFATE PRN MG: 4 INJECTION INTRAVENOUS at 14:22

## 2017-01-12 RX ADMIN — TAMSULOSIN HYDROCHLORIDE SCH MG: 0.4 CAPSULE ORAL at 10:05

## 2017-01-12 NOTE — PULMONOLOGY PROGRESS NOTE
Assessment/Plan


Problems:  


(1) Sepsis


(2) Cellulitis


(3) ATN (acute tubular necrosis)


Assessment/Plan


afebrile


no new complains


being dialyzed


legs looks much better


continue antibiotics





Subjective


ROS Limited/Unobtainable:  No


Constitutional:  Reports: anorexia, chills, fatigue, fever


Musculoskeletal:  Reports: pain, stiffness, swelling


Allergies:  


Coded Allergies:  


     SULFUR (Unverified  Allergy, Unknown, unk, 1/7/17)


 Per patient allergic to sulfur





Objective





Last 24 Hour Vital Signs








  Date Time  Temp Pulse Resp B/P Pulse Ox O2 Delivery O2 Flow Rate FiO2


 


1/12/17 14:09  77  163/99    


 


1/12/17 08:00 97.5 77 19 163/99 96 Room Air  


 


1/12/17 04:00 97.3 81 18 156/89 95 Nasal Cannula 2.0 


 


1/12/17 00:00 98.4 86 20 146/93 91 Room Air  


 


1/11/17 19:00 98.2 86 20 157/86 93 Room Air  

















Intake and Output  


 


 1/11/17 1/12/17





 19:00 07:00


 


Intake Total 450 ml 120 ml


 


Output Total 1500 ml 1000 ml


 


Balance -1050 ml -880 ml


 


  


 


Intake Oral 450 ml 120 ml


 


Output Urine Total 400 ml 1000 ml


 


Hemodialysis UF 1100 ml 


 


# Voids  4








General Appearance:  no acute distress


HEENT:  normocephalic, atraumatic, anicteric, PERRL


Respiratory/Chest:  chest wall non-tender, lungs clear, decreased breath sounds

, accessory muscle use


Cardiovascular:  normal peripheral pulses, normal rate, regular rhythm


Abdomen:  normal bowel sounds, soft, non tender, no organomegaly


Genitourinary:  normal external genitalia


Extremities:  no cyanosis


Skin:  rash, lesions


Neurologic/Psychiatric:  CNs II-XII grossly normal, no motor/sensory deficits


Laboratory Tests


1/12/17 05:00: 


White Blood Count 9.8, Red Blood Count 3.15L, Hemoglobin 9.1L, Hematocrit 28.3L

, Mean Corpuscular Volume 90, Mean Corpuscular Hemoglobin 28.9, Mean 

Corpuscular Hemoglobin Concent 32.2, Red Cell Distribution Width 11.9, Platelet 

Count 397, Mean Platelet Volume 5.3L, Neutrophils (%) (Auto) 66.8, Lymphocytes (

%) (Auto) 17.0L, Monocytes (%) (Auto) 11.9H, Eosinophils (%) (Auto) 3.3H, 

Basophils (%) (Auto) 1.0, Sodium Level 139, Potassium Level 3.9, Chloride Level 

103, Carbon Dioxide Level 25, Anion Gap 11, Blood Urea Nitrogen 33H, Creatinine 

4.7H, Estimat Glomerular Filtration Rate 16.4, Glucose Level 92, Uric Acid 7.5, 

Calcium Level 7.7L, Phosphorus Level 4.9H, Magnesium Level 1.9, Total Bilirubin 

0.3, Aspartate Amino Transf (AST/SGOT) 21, Alanine Aminotransferase (ALT/SGPT) 

14, Alkaline Phosphatase 49, Total Protein 5.5L, Albumin 2.2L, Globulin 3.3, 

Albumin/Globulin Ratio 0.6L





Current Medications








 Medications


  (Trade)  Dose


 Ordered  Sig/Kathleen


 Route


 PRN Reason  Start Time


 Stop Time Status Last Admin


Dose Admin


 


 Acetaminophen


  (Tylenol)  650 mg  Q4H  PRN


 ORAL


 fever  1/2/17 23:45


 2/1/17 23:44  1/7/17 02:46


 


 


 Amlodipine


 Besylate


  (Norvasc)  5 mg  DAILY


 ORAL


   1/12/17 13:00


 2/11/17 12:59  1/12/17 14:09


 


 


 Ceftaroline


 Fosamil/Dextrose


  (Teflaro/D5W


 50ml)  50 ml @ 50


 mls/hr  Q12HR


 IV


   1/10/17 21:00


 1/14/17 20:59  1/12/17 10:15


 


 


 Dextrose


  (Dextrose 50%)    STAT  PRN


 IV


 Hypoglycemia  1/2/17 23:45


 2/1/17 23:44   


 


 


 Heparin Sodium


  (Porcine)


  (Heparin 5000


 units/ml)  5,000 units  EVERY 12  HOURS


 SUBQ


   1/3/17 09:00


 2/2/17 08:59  1/12/17 10:12


 


 


 Heparin Sodium


  (Porcine)


  (Heparin Sod


 1000 units/ml


 10ml)  2,000 unit  ONCE  ONCE


 INJ


   1/12/17 14:00


 1/12/17 14:01 UNV  


 


 


 Heparin Sodium/


 Sodium Chloride


  (Heparin 2000


 units/Ns 1000ml


 premix)  2,000 unit  ONCE  ONCE


 INJ


   1/12/17 14:00


 1/12/17 14:01 UNV  


 


 


 Lidocaine HCl


  (Xylocaine 1%


 30ml)  30 ml  ONCE  ONCE


 INJ


   1/12/17 14:00


 1/12/17 14:01 UNV  


 


 


 Morphine Sulfate


  (Morphine


 Sulfate)  4 mg  Q4H  PRN


 IVP


 For Pain  1/11/17 22:00


 1/18/17 21:59  1/12/17 14:22


 


 


 Ondansetron HCl


  (Zofran)  4 mg  Q6H  PRN


 IVP


 Nausea & Vomiting  1/2/17 23:45


 2/1/17 23:44   


 


 


 Polyethylene


 Glycol


  (Miralax)  17 gm  HSPRN  PRN


 ORAL


 Constipation  1/2/17 23:45


 2/1/17 23:44   


 


 


 Sodium Bicarbonate


  (Sodium


 Bicarbonate)  50 ml  ONCE  ONCE


 IV


   1/12/17 14:00


 1/12/17 14:01 UNV  


 


 


 Tamsulosin HCl


 0.4 mg  0.4 mg  BID


 ORAL


   1/7/17 11:30


 2/6/17 11:29  1/12/17 10:05


 


 


 Zolpidem Tartrate


  (Ambien)  5 mg  HSPRN  PRN


 ORAL


 Insomnia  1/2/17 23:45


 2/1/17 23:44   


 

















GE WIN Jan 12, 2017 18:10

## 2017-01-12 NOTE — GENERAL PROGRESS NOTE
Assessment/Plan


Status:  unchanged


Assessment/Plan


Status:





Cellulitis left leg- Sepsis, received Amikacin and Vanco in the past.


Acute renal failure-


HypoAlbuminemia , Proteinuria , ? NS


Multi drug abuse








Plan:





DC IV-


Add Norvasc-


Change Dialysis cath-


check 24 h urine for protein, les than 1 gram


On  flomax-





Vanco level- noted





Urine studies- Urine Tox screen- MJ and Amphetamines


Monitor renal parameters- 


Dialysed 1/9 due dialysis 1/11 


Per orders





Subjective


ROS Limited/Unobtainable:  No


Constitutional:  Reports: malaise


Respiratory:  Reports: shortness of breath


Allergies:  


Coded Allergies:  


     SULFUR (Unverified  Allergy, Unknown, unk, 1/7/17)


 Per patient allergic to sulfur





Objective





Last 24 Hour Vital Signs








  Date Time  Temp Pulse Resp B/P Pulse Ox O2 Delivery O2 Flow Rate FiO2


 


1/12/17 08:00 97.5 77 19 163/99 96 Room Air  


 


1/12/17 04:00 97.3 81 18 156/89 95 Nasal Cannula 2.0 


 


1/12/17 00:00 98.4 86 20 146/93 91 Room Air  


 


1/11/17 19:00 98.2 86 20 157/86 93 Room Air  


 


1/11/17 18:10      Room Air  


 


1/11/17 15:56 98.1 79 20 158/80 95 Room Air  


 


1/11/17 15:30      Room Air  

















Intake and Output  


 


 1/11/17 1/12/17





 19:00 07:00


 


Intake Total 450 ml 120 ml


 


Output Total 1500 ml 1000 ml


 


Balance -1050 ml -880 ml


 


  


 


Intake Oral 450 ml 120 ml


 


Output Urine Total 400 ml 1000 ml


 


Hemodialysis UF 1100 ml 


 


# Voids  4








Laboratory Tests


1/12/17 05:00: 


White Blood Count 9.8, Red Blood Count 3.15L, Hemoglobin 9.1L, Hematocrit 28.3L

, Mean Corpuscular Volume 90, Mean Corpuscular Hemoglobin 28.9, Mean 

Corpuscular Hemoglobin Concent 32.2, Red Cell Distribution Width 11.9, Platelet 

Count 397, Mean Platelet Volume 5.3L, Neutrophils (%) (Auto) 66.8, Lymphocytes (

%) (Auto) 17.0L, Monocytes (%) (Auto) 11.9H, Eosinophils (%) (Auto) 3.3H, 

Basophils (%) (Auto) 1.0, Sodium Level 139, Potassium Level 3.9, Chloride Level 

103, Carbon Dioxide Level 25, Anion Gap 11, Blood Urea Nitrogen 33H, Creatinine 

4.7H, Estimat Glomerular Filtration Rate 16.4, Glucose Level 92, Uric Acid 7.5, 

Calcium Level 7.7L, Phosphorus Level 4.9H, Magnesium Level 1.9, Total Bilirubin 

0.3, Aspartate Amino Transf (AST/SGOT) 21, Alanine Aminotransferase (ALT/SGPT) 

14, Alkaline Phosphatase 49, Total Protein 5.5L, Albumin 2.2L, Globulin 3.3, 

Albumin/Globulin Ratio 0.6L


Height (Feet):  6


Height (Inches):  1.00


Weight (Pounds):  200


General Appearance:  mild distress


Cardiovascular:  normal rate


Respiratory/Chest:  decreased breath sounds


Abdomen:  soft


Objective


no change in PE











ALISON ROTHMAN Jan 12, 2017 12:24

## 2017-01-13 VITALS — DIASTOLIC BLOOD PRESSURE: 92 MMHG | SYSTOLIC BLOOD PRESSURE: 150 MMHG

## 2017-01-13 VITALS — DIASTOLIC BLOOD PRESSURE: 92 MMHG | SYSTOLIC BLOOD PRESSURE: 168 MMHG

## 2017-01-13 VITALS — DIASTOLIC BLOOD PRESSURE: 77 MMHG | SYSTOLIC BLOOD PRESSURE: 140 MMHG

## 2017-01-13 VITALS — SYSTOLIC BLOOD PRESSURE: 156 MMHG | DIASTOLIC BLOOD PRESSURE: 87 MMHG

## 2017-01-13 LAB
ALBUMIN/GLOB SERPL: 0.6 {RATIO} (ref 1–2.7)
ALT SERPL-CCNC: 10 U/L (ref 3–41)
ANION GAP SERPL CALC-SCNC: 13 MMOL/L (ref 5–15)
AST SERPL-CCNC: 18 U/L (ref 5–40)
BASOPHILS NFR BLD AUTO: 0.9 % (ref 0–2)
CALCIUM SERPL-MCNC: 7.8 MG/DL (ref 8.6–10.2)
CHLORIDE SERPL-SCNC: 103 MEQ/L (ref 98–107)
CO2 SERPL-SCNC: 26 MEQ/L (ref 20–30)
CREAT SERPL-MCNC: 4.8 MG/DL (ref 0.7–1.2)
CRP SERPL-MCNC: 4.9 MG/DL (ref ?–0.5)
EOSINOPHIL NFR BLD AUTO: 4.3 % (ref 0–3)
ERYTHROCYTE [DISTWIDTH] IN BLOOD BY AUTOMATED COUNT: 11.9 % (ref 11.6–14.8)
FERRITIN SERPL-MCNC: 216 NG/ML (ref 10–230)
GFR SERPLBLD BASED ON 1.73 SQ M-ARVRAT: 16 ML/MIN (ref 60–?)
GLOBULIN SER-MCNC: 3.2 G/DL
HEMOLYSIS: 1
HEMOLYSIS: 2
IRON SERPL-MCNC: 29 UG/DL (ref 59–158)
LYMPHOCYTES NFR BLD AUTO: 17.2 % (ref 20–45)
MCH RBC QN AUTO: 29.3 PG (ref 27–31)
MCHC RBC AUTO-ENTMCNC: 32.3 G/DL (ref 32–36)
MCV RBC AUTO: 91 FL (ref 80–99)
MONOCYTES NFR BLD AUTO: 12.8 % (ref 1–10)
NEUTROPHILS NFR BLD AUTO: 64.8 % (ref 45–75)
PHOSPHATE SERPL-MCNC: 6.9 MG/DL (ref 2.5–4.8)
PLATELET # BLD: 402 K/UL (ref 150–450)
PMV BLD AUTO: 4.8 FL (ref 6.5–10.1)
POTASSIUM SERPL-SCNC: 4.4 MEQ/L (ref 3.4–4.9)
PROT SERPL-MCNC: 5.4 G/DL (ref 6.6–8.7)
RBC # BLD AUTO: 3.04 M/UL (ref 4.7–6.1)
SODIUM SERPL-SCNC: 142 MEQ/L (ref 135–145)
TIBC SERPL-MCNC: 158 UG/DL (ref 250–400)
URATE SERPL-MCNC: 8.2 MG/DL (ref 3–7.5)
VIT B12 SERPL-MCNC: 366 PG/ML (ref 211–946)
WBC # BLD AUTO: 8.6 K/UL (ref 4.8–10.8)

## 2017-01-13 RX ADMIN — MORPHINE SULFATE PRN MG: 4 INJECTION INTRAVENOUS at 12:30

## 2017-01-13 RX ADMIN — TAMSULOSIN HYDROCHLORIDE SCH MG: 0.4 CAPSULE ORAL at 18:28

## 2017-01-13 RX ADMIN — MORPHINE SULFATE PRN MG: 4 INJECTION INTRAVENOUS at 18:28

## 2017-01-13 RX ADMIN — HEPARIN SODIUM SCH UNITS: 5000 INJECTION INTRAVENOUS; SUBCUTANEOUS at 22:22

## 2017-01-13 RX ADMIN — MORPHINE SULFATE PRN MG: 4 INJECTION INTRAVENOUS at 00:48

## 2017-01-13 RX ADMIN — DOCUSATE SODIUM SCH MG: 100 CAPSULE, LIQUID FILLED ORAL at 13:00

## 2017-01-13 RX ADMIN — MORPHINE SULFATE PRN MG: 4 INJECTION INTRAVENOUS at 16:02

## 2017-01-13 RX ADMIN — TAMSULOSIN HYDROCHLORIDE SCH MG: 0.4 CAPSULE ORAL at 08:59

## 2017-01-13 RX ADMIN — MORPHINE SULFATE PRN MG: 4 INJECTION INTRAVENOUS at 22:30

## 2017-01-13 RX ADMIN — MORPHINE SULFATE PRN MG: 4 INJECTION INTRAVENOUS at 05:29

## 2017-01-13 RX ADMIN — SODIUM CHLORIDE SCH MLS/HR: 0.9 INJECTION INTRAVENOUS at 22:23

## 2017-01-13 RX ADMIN — SODIUM CHLORIDE SCH MLS/HR: 0.9 INJECTION INTRAVENOUS at 09:00

## 2017-01-13 RX ADMIN — HEPARIN SODIUM SCH UNITS: 5000 INJECTION INTRAVENOUS; SUBCUTANEOUS at 08:58

## 2017-01-13 RX ADMIN — DOCUSATE SODIUM SCH MG: 100 CAPSULE, LIQUID FILLED ORAL at 18:28

## 2017-01-13 NOTE — PULMONOLOGY PROGRESS NOTE
Assessment/Plan


Problems:  


(1) Sepsis


(2) Cellulitis


(3) ATN (acute tubular necrosis)


Assessment/Plan


afebrile


no new complains





legs looks much better


continue antibiotics





waiting to see if pt will need Hd





Subjective


ROS Limited/Unobtainable:  No


Interval Events:  doens't want to talk about anything to me


Allergies:  


Coded Allergies:  


     SULFUR (Unverified  Allergy, Unknown, unk, 1/7/17)


 Per patient allergic to sulfur





Objective





Last 24 Hour Vital Signs








  Date Time  Temp Pulse Resp B/P Pulse Ox O2 Delivery O2 Flow Rate FiO2


 


1/13/17 08:59  97  140/88    


 


1/13/17 08:03 97.1  18 140/77 89 Nasal Cannula 2.0 


 


1/13/17 04:00 95.4 78 20 150/92 99 Nasal Cannula 2.0 

















Intake and Output  


 


 1/12/17 1/13/17





 19:00 07:00


 


Intake Total 200 ml 980 ml


 


Balance 200 ml 980 ml


 


  


 


Intake Oral 200 ml 930 ml


 


IV Total  50 ml


 


# Voids 2 4








Respiratory/Chest:  chest wall non-tender, normal breath sounds


Cardiovascular:  normal peripheral pulses, normal rate


Abdomen:  normal bowel sounds


Skin:  no rash


Neurologic/Psychiatric:  CNs II-XII grossly normal


Laboratory Tests


1/13/17 05:00: 


White Blood Count 8.6, Red Blood Count 3.04L, Hemoglobin 8.9L, Hematocrit 27.6L

, Mean Corpuscular Volume 91, Mean Corpuscular Hemoglobin 29.3, Mean 

Corpuscular Hemoglobin Concent 32.3, Red Cell Distribution Width 11.9, Platelet 

Count 402, Mean Platelet Volume 4.8L, Neutrophils (%) (Auto) 64.8, Lymphocytes (

%) (Auto) 17.2L, Monocytes (%) (Auto) 12.8H, Eosinophils (%) (Auto) 4.3H, 

Basophils (%) (Auto) 0.9, Sodium Level 142, Potassium Level 4.4, Chloride Level 

103, Carbon Dioxide Level 26, Anion Gap 13, Blood Urea Nitrogen 36H, Creatinine 

4.8H, Estimat Glomerular Filtration Rate 16.0, Glucose Level 93, Uric Acid 8.2H

, Calcium Level 7.8L, Phosphorus Level 6.9H, Iron Level 29L, Total Iron Binding 

Capacity 158L, Percent Iron Saturation 18, Unsaturated Iron Binding 129, 

Ferritin 216, Total Bilirubin 0.2, Gamma Glutamyl Transpeptidase 18, Aspartate 

Amino Transf (AST/SGOT) 18, Alanine Aminotransferase (ALT/SGPT) 10, Alkaline 

Phosphatase 49, C-Reactive Protein, Quantitative 4.9H, Pro-B-Type Natriuretic 

Peptide 5032H, Total Protein 5.4L, Albumin 2.2L, Globulin 3.2, Albumin/Globulin 

Ratio 0.6L, Vitamin B12 Level 366, Folate [Pending]





Current Medications








 Medications


  (Trade)  Dose


 Ordered  Sig/Kathleen


 Route


 PRN Reason  Start Time


 Stop Time Status Last Admin


Dose Admin


 


 Acetaminophen


  (Tylenol)  650 mg  Q4H  PRN


 ORAL


 fever  1/2/17 23:45


 2/1/17 23:44  1/7/17 02:46


 


 


 Amlodipine


 Besylate


  (Norvasc)  5 mg  BID


 ORAL


   1/13/17 18:00


 2/12/17 17:59   


 


 


 Ceftaroline


 Fosamil/Dextrose


  (Teflaro/D5W


 50ml)  50 ml @ 50


 mls/hr  Q12HR


 IV


   1/10/17 21:00


 1/15/17 20:59  1/12/17 22:47


 


 


 Dextrose


  (Dextrose 50%)    STAT  PRN


 IV


 Hypoglycemia  1/2/17 23:45


 2/1/17 23:44   


 


 


 Docusate Sodium


  (Colace)  100 mg  THREE TIMES A  DAY


 ORAL


   1/13/17 13:00


 2/12/17 12:59   


 


 


 Heparin Sodium


  (Porcine)


  (Heparin 5000


 units/ml)  5,000 units  EVERY 12  HOURS


 SUBQ


   1/3/17 09:00


 2/2/17 08:59  1/13/17 08:58


 


 


 Morphine Sulfate


  (Morphine


 Sulfate)  4 mg  Q4H  PRN


 IVP


 For Pain  1/11/17 22:00


 1/18/17 21:59  1/13/17 12:30


 


 


 Ondansetron HCl


  (Zofran)  4 mg  Q6H  PRN


 IVP


 Nausea & Vomiting  1/2/17 23:45


 2/1/17 23:44   


 


 


 Polyethylene


 Glycol


  (Miralax)  17 gm  HSPRN  PRN


 ORAL


 Constipation  1/2/17 23:45


 2/1/17 23:44   


 


 


 Sevelamer


 Carbonate


  (Renvela)  1,600 mg  THREE TIMES A  DAY


 ORAL


   1/13/17 13:00


 2/12/17 12:59   


 


 


 Tamsulosin HCl


 0.4 mg  0.4 mg  BID


 ORAL


   1/7/17 11:30


 2/6/17 11:29  1/13/17 08:59


 


 


 Zolpidem Tartrate


  (Ambien)  5 mg  HSPRN  PRN


 ORAL


 Insomnia  1/2/17 23:45


 2/1/17 23:44   


 

















GE WIN Jan 13, 2017 16:11

## 2017-01-13 NOTE — GENERAL PROGRESS NOTE
Assessment/Plan


Status:  unchanged


Assessment/Plan


Status:





Cellulitis left leg- Sepsis, received Amikacin and Vanco in the past.


Acute renal failure-


HypoAlbuminemia , Proteinuria , ? NS


Multi drug abuse








Plan:





Off IV-


On Norvasc-


will add Renagel-


No need for HD today- Cr ? leveling off


Change Dialysis cath-


check 24 h urine for protein, les than 1 gram


On  flomax-





Vanco level- noted





Urine studies- Urine Tox screen- MJ and Amphetamines


Monitor renal parameters- 


Dialysed  1/11 


Per orders





Subjective


ROS Limited/Unobtainable:  No


Constitutional:  Reports: malaise


Allergies:  


Coded Allergies:  


     SULFUR (Unverified  Allergy, Unknown, unk, 1/7/17)


 Per patient allergic to sulfur





Objective





Last 24 Hour Vital Signs








  Date Time  Temp Pulse Resp B/P Pulse Ox O2 Delivery O2 Flow Rate FiO2


 


1/13/17 08:59  97  140/88    


 


1/13/17 08:03 97.1  18 140/77 89 Nasal Cannula 2.0 


 


1/13/17 04:00 95.4 78 20 150/92 99 Nasal Cannula 2.0 


 


1/12/17 16:10 98.1 83 18 161/94 90 Room Air  


 


1/12/17 14:09  77  163/99    

















Intake and Output  


 


 1/12/17 1/13/17





 19:00 07:00


 


Intake Total 200 ml 980 ml


 


Balance 200 ml 980 ml


 


  


 


Intake Oral 200 ml 930 ml


 


IV Total  50 ml


 


# Voids 2 4








Laboratory Tests


1/13/17 05:00: 


White Blood Count 8.6, Red Blood Count 3.04L, Hemoglobin 8.9L, Hematocrit 27.6L

, Mean Corpuscular Volume 91, Mean Corpuscular Hemoglobin 29.3, Mean 

Corpuscular Hemoglobin Concent 32.3, Red Cell Distribution Width 11.9, Platelet 

Count 402, Mean Platelet Volume 4.8L, Neutrophils (%) (Auto) 64.8, Lymphocytes (

%) (Auto) 17.2L, Monocytes (%) (Auto) 12.8H, Eosinophils (%) (Auto) 4.3H, 

Basophils (%) (Auto) 0.9, Sodium Level 142, Potassium Level 4.4, Chloride Level 

103, Carbon Dioxide Level 26, Anion Gap 13, Blood Urea Nitrogen 36H, Creatinine 

4.8H, Estimat Glomerular Filtration Rate 16.0, Glucose Level 93, Uric Acid 8.2H

, Calcium Level 7.8L, Phosphorus Level 6.9H, Iron Level 29L, Total Iron Binding 

Capacity 158L, Percent Iron Saturation 18, Unsaturated Iron Binding 129, 

Ferritin 216, Total Bilirubin 0.2, Gamma Glutamyl Transpeptidase 18, Aspartate 

Amino Transf (AST/SGOT) 18, Alanine Aminotransferase (ALT/SGPT) 10, Alkaline 

Phosphatase 49, C-Reactive Protein, Quantitative 4.9H, Pro-B-Type Natriuretic 

Peptide 5032H, Total Protein 5.4L, Albumin 2.2L, Globulin 3.2, Albumin/Globulin 

Ratio 0.6L, Vitamin B12 Level 366, Folate [Pending]


Height (Feet):  6


Height (Inches):  1.00


Weight (Pounds):  200


General Appearance:  no apparent distress


Objective


no change in PE











ALISON ROTHMAN Jan 13, 2017 11:24

## 2017-01-13 NOTE — GENERAL SURGERY PROGRESS NOTE
General Surgery-Progress Note


Subjective


Reason for Consult


left lower extremity cellulitis


Symptoms:  improved


Additional Comments


leg looks much better.





Objective





Last 24 Hour Vital Signs








  Date Time  Temp Pulse Resp B/P Pulse Ox O2 Delivery O2 Flow Rate FiO2


 


1/13/17 08:59  97  140/88    


 


1/13/17 08:03 97.1  18 140/77 89 Nasal Cannula 2.0 


 


1/13/17 04:00 95.4 78 20 150/92 99 Nasal Cannula 2.0 


 


1/12/17 16:10 98.1 83 18 161/94 90 Room Air  


 


1/12/17 14:09  77  163/99    








I&O











Intake and Output  


 


 1/12/17 1/13/17





 19:00 07:00


 


Intake Total 200 ml 980 ml


 


Balance 200 ml 980 ml


 


  


 


Intake Oral 200 ml 930 ml


 


IV Total  50 ml


 


# Voids 2 4








Wound:  clean, dry - dry scab over prior blister


Cardiovascular:  RSR


Respiratory:  clear


Abdomen:  soft, non-tender


Extremities:  no edema, other - left leg much improved.  small area of scab 

from prior blister.  no edema.  good pulses





Laboratory Tests








Test


  1/13/17


05:00


 


White Blood Count


  8.6 K/UL


(4.8-10.8)


 


Red Blood Count


  3.04 M/UL


(4.70-6.10)  L


 


Hemoglobin


  8.9 G/DL


(14.2-18.0)  L


 


Hematocrit


  27.6 %


(42.0-52.0)  L


 


Mean Corpuscular Volume 91 FL (80-99)  


 


Mean Corpuscular Hemoglobin


  29.3 PG


(27.0-31.0)


 


Mean Corpuscular Hemoglobin


Concent 32.3 G/DL


(32.0-36.0)


 


Red Cell Distribution Width


  11.9 %


(11.6-14.8)


 


Platelet Count


  402 K/UL


(150-450)


 


Mean Platelet Volume


  4.8 FL


(6.5-10.1)  L


 


Neutrophils (%) (Auto)


  64.8 %


(45.0-75.0)


 


Lymphocytes (%) (Auto)


  17.2 %


(20.0-45.0)  L


 


Monocytes (%) (Auto)


  12.8 %


(1.0-10.0)  H


 


Eosinophils (%) (Auto)


  4.3 %


(0.0-3.0)  H


 


Basophils (%) (Auto)


  0.9 %


(0.0-2.0)


 


Sodium Level


  142 mEQ/L


(135-145)


 


Potassium Level


  4.4 mEQ/L


(3.4-4.9)


 


Chloride Level


  103 mEQ/L


()


 


Carbon Dioxide Level


  26 mEQ/L


(20-30)


 


Anion Gap 13 (5-15)  


 


Blood Urea Nitrogen


  36 mg/dL


(7-23)  H


 


Creatinine


  4.8 mg/dL


(0.7-1.2)  H


 


Estimat Glomerular Filtration


Rate 16.0 mL/min


(>60)


 


Glucose Level


  93 mg/dL


()


 


Uric Acid


  8.2 mg/dL


(3.0-7.5)  H


 


Calcium Level


  7.8 mg/dL


(8.6-10.2)  L


 


Phosphorus Level


  6.9 mg/dL


(2.5-4.8)  H


 


Iron Level


  29 ug/dL


()  L


 


Total Iron Binding Capacity


  158 ug/dL


(250-400)  L


 


Percent Iron Saturation 18 % (15-50)  


 


Unsaturated Iron Binding


  129 ug/dL


(112-346)


 


Ferritin


  216 ng/mL


()


 


Total Bilirubin


  0.2 mg/dL


(0.0-1.2)


 


Gamma Glutamyl Transpeptidase 18 U/L (8-61)  


 


Aspartate Amino Transf


(AST/SGOT) 18 U/L (5-40)  


 


 


Alanine Aminotransferase


(ALT/SGPT) 10 U/L (3-41)  


 


 


Alkaline Phosphatase


  49 U/L


()


 


C-Reactive Protein,


Quantitative 4.9 mg/dL (<


0.5)  H


 


Pro-B-Type Natriuretic Peptide


  5032 pg/mL


(0-125)  H


 


Total Protein


  5.4 g/dL


(6.6-8.7)  L


 


Albumin


  2.2 g/dL


(3.5-5.2)  L


 


Globulin 3.2 g/dL  


 


Albumin/Globulin Ratio


  0.6 (1.0-2.7)


L


 


Vitamin B12 Level


  366 pg/mL


(211-946)


 


Folate Pending  











Assessment


Post-op Diagnosis


46 year old male with left lower extremity cellulitis that is now resolved.  

wounds clean and dry and leg healing.  renal function declined during hospital 

course and required dialysis.  will hopefully recover from renal aspect soon 

too.





Plan


Problems:  


(1) Cellulitis


(2) Sepsis











Jaiden Patel MD Jan 13, 2017 10:46

## 2017-01-13 NOTE — INFECTIOUS DISEASES PROG NOTE
Assessment/Plan


Assessment/Plan





ASSESSMENT:   46-year-old male with:





Left lower extremity cellulitis - improved


   MRI : no evidence of associated abscess, fasciitis or osteomyelitis


   Doppler : No DVT 


Fever - resolved 


Leucocytosis - resolved 








ARF SP rupinder 1/9, initiated HD


   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 Medical renal disease suspected.


Polysubstance abuse - UDS(+) Amphetamines, opiates, marijuana, tobacco


Sulfa allergy


Full Code








PLAN:





ok to DC on PO doxycycline x5d from ID standpoint - Rx on chart.  Will continue

  Ceftaroline  d# 8 while still inpt


 ( 1/5 SP on  cefepime and  vancomycin d# 4 ) 


Monitor CBC, temperatures


Monitor BMP.


HD prn





d/w Dr. Salazar





Subjective


Allergies:  


Coded Allergies:  


     SULFUR (Unverified  Allergy, Unknown, unk, 1/7/17)


 Per patient allergic to sulfur


Subjective





remains afebrile. improved





Objective


Vital Signs





Last 24 Hour Vital Signs








  Date Time  Temp Pulse Resp B/P Pulse Ox O2 Delivery O2 Flow Rate FiO2


 


1/13/17 08:59  97  140/88    


 


1/13/17 08:03 97.1  18 140/77 89 Nasal Cannula 2.0 


 


1/13/17 04:00 95.4 78 20 150/92 99 Nasal Cannula 2.0 


 


1/12/17 16:10 98.1 83 18 161/94 90 Room Air  


 


1/12/17 14:09  77  163/99    








Height (Feet):  6


Height (Inches):  1.00


Weight (Pounds):  200


General Appearance:  no acute distress


Respiratory/Chest:  no respiratory distress


Cardiovascular:  normal rate, regular rhythm


Abdomen:  normal bowel sounds, soft, non tender, non distended





Laboratory Tests








Test


  1/13/17


05:00


 


White Blood Count


  8.6 K/UL


(4.8-10.8)


 


Red Blood Count


  3.04 M/UL


(4.70-6.10)  L


 


Hemoglobin


  8.9 G/DL


(14.2-18.0)  L


 


Hematocrit


  27.6 %


(42.0-52.0)  L


 


Mean Corpuscular Volume 91 FL (80-99)  


 


Mean Corpuscular Hemoglobin


  29.3 PG


(27.0-31.0)


 


Mean Corpuscular Hemoglobin


Concent 32.3 G/DL


(32.0-36.0)


 


Red Cell Distribution Width


  11.9 %


(11.6-14.8)


 


Platelet Count


  402 K/UL


(150-450)


 


Mean Platelet Volume


  4.8 FL


(6.5-10.1)  L


 


Neutrophils (%) (Auto)


  64.8 %


(45.0-75.0)


 


Lymphocytes (%) (Auto)


  17.2 %


(20.0-45.0)  L


 


Monocytes (%) (Auto)


  12.8 %


(1.0-10.0)  H


 


Eosinophils (%) (Auto)


  4.3 %


(0.0-3.0)  H


 


Basophils (%) (Auto)


  0.9 %


(0.0-2.0)


 


Sodium Level


  142 mEQ/L


(135-145)


 


Potassium Level


  4.4 mEQ/L


(3.4-4.9)


 


Chloride Level


  103 mEQ/L


()


 


Carbon Dioxide Level


  26 mEQ/L


(20-30)


 


Anion Gap 13 (5-15)  


 


Blood Urea Nitrogen


  36 mg/dL


(7-23)  H


 


Creatinine


  4.8 mg/dL


(0.7-1.2)  H


 


Estimat Glomerular Filtration


Rate 16.0 mL/min


(>60)


 


Glucose Level


  93 mg/dL


()


 


Uric Acid


  8.2 mg/dL


(3.0-7.5)  H


 


Calcium Level


  7.8 mg/dL


(8.6-10.2)  L


 


Phosphorus Level


  6.9 mg/dL


(2.5-4.8)  H


 


Iron Level


  29 ug/dL


()  L


 


Total Iron Binding Capacity


  158 ug/dL


(250-400)  L


 


Percent Iron Saturation 18 % (15-50)  


 


Unsaturated Iron Binding


  129 ug/dL


(112-346)


 


Ferritin


  216 ng/mL


()


 


Total Bilirubin


  0.2 mg/dL


(0.0-1.2)


 


Gamma Glutamyl Transpeptidase 18 U/L (8-61)  


 


Aspartate Amino Transf


(AST/SGOT) 18 U/L (5-40)  


 


 


Alanine Aminotransferase


(ALT/SGPT) 10 U/L (3-41)  


 


 


Alkaline Phosphatase


  49 U/L


()


 


C-Reactive Protein,


Quantitative 4.9 mg/dL (<


0.5)  H


 


Pro-B-Type Natriuretic Peptide


  5032 pg/mL


(0-125)  H


 


Total Protein


  5.4 g/dL


(6.6-8.7)  L


 


Albumin


  2.2 g/dL


(3.5-5.2)  L


 


Globulin 3.2 g/dL  


 


Albumin/Globulin Ratio


  0.6 (1.0-2.7)


L


 


Vitamin B12 Level


  366 pg/mL


(211-946)


 


Folate Pending  











Current Medications








 Medications


  (Trade)  Dose


 Ordered  Sig/Kathleen


 Route


 PRN Reason  Start Time


 Stop Time Status Last Admin


Dose Admin


 


 Acetaminophen


  (Tylenol)  650 mg  Q4H  PRN


 ORAL


 fever  1/2/17 23:45


 2/1/17 23:44  1/7/17 02:46


 


 


 Amlodipine


 Besylate


  (Norvasc)  5 mg  BID


 ORAL


   1/13/17 18:00


 2/12/17 17:59   


 


 


 Ceftaroline


 Fosamil/Dextrose


  (Teflaro/D5W


 50ml)  50 ml @ 50


 mls/hr  Q12HR


 IV


   1/10/17 21:00


 1/14/17 20:59  1/12/17 22:47


 


 


 Dextrose


  (Dextrose 50%)    STAT  PRN


 IV


 Hypoglycemia  1/2/17 23:45


 2/1/17 23:44   


 


 


 Docusate Sodium


  (Colace)  100 mg  THREE TIMES A  DAY


 ORAL


   1/13/17 13:00


 2/12/17 12:59   


 


 


 Heparin Sodium


  (Porcine)


  (Heparin 5000


 units/ml)  5,000 units  EVERY 12  HOURS


 SUBQ


   1/3/17 09:00


 2/2/17 08:59  1/13/17 08:58


 


 


 Heparin Sodium/


 Sodium Chloride


  (Heparin 2000


 units/Ns 1000ml


 premix)  2,000 unit  ONCE


 INJ


   1/13/17 10:45


 1/13/17 16:00   


 


 


 Lidocaine HCl


  (Xylocaine 1%


 30ml)  30 ml  ONCE


 INJ


   1/13/17 10:45


 1/13/17 16:00   


 


 


 Morphine Sulfate


  (Morphine


 Sulfate)  4 mg  Q4H  PRN


 IVP


 For Pain  1/11/17 22:00


 1/18/17 21:59  1/13/17 12:30


 


 


 Ondansetron HCl


  (Zofran)  4 mg  Q6H  PRN


 IVP


 Nausea & Vomiting  1/2/17 23:45


 2/1/17 23:44   


 


 


 Polyethylene


 Glycol


  (Miralax)  17 gm  HSPRN  PRN


 ORAL


 Constipation  1/2/17 23:45


 2/1/17 23:44   


 


 


 Sevelamer


 Carbonate


  (Renvela)  1,600 mg  THREE TIMES A  DAY


 ORAL


   1/13/17 13:00


 2/12/17 12:59   


 


 


 Sodium Bicarbonate


  (Sodium


 Bicarbonate)  50 ml  ONCE


 IV


   1/13/17 10:45


 1/13/17 16:00   


 


 


 Tamsulosin HCl


 0.4 mg  0.4 mg  BID


 ORAL


   1/7/17 11:30


 2/6/17 11:29  1/13/17 08:59


 


 


 Zolpidem Tartrate


  (Ambien)  5 mg  HSPRN  PRN


 ORAL


 Insomnia  1/2/17 23:45


 2/1/17 23:44   


 

















TANIA FRANCIS Jan 13, 2017 13:38

## 2017-01-13 NOTE — DIAGNOSTIC IMAGING REPORT
Indication: Dyspnea



Comparison:  1/10/17



A single view chest radiograph was obtained.



Findings:



Patchy bilateral perihilar and basilar parenchymal opacities likely mild pulmonary

edema noted. Bilateral pleural effusions also suspected with hazy basilar opacities.

The findings have improved since last study. Right jugular catheter is in good

position unchanged. PICC line is stable.



Impression:



CHF bilateral pleural effusions. Overall improved since last study

## 2017-01-14 VITALS — SYSTOLIC BLOOD PRESSURE: 159 MMHG | DIASTOLIC BLOOD PRESSURE: 90 MMHG

## 2017-01-14 VITALS — DIASTOLIC BLOOD PRESSURE: 83 MMHG | SYSTOLIC BLOOD PRESSURE: 139 MMHG

## 2017-01-14 VITALS — DIASTOLIC BLOOD PRESSURE: 91 MMHG | SYSTOLIC BLOOD PRESSURE: 152 MMHG

## 2017-01-14 VITALS — DIASTOLIC BLOOD PRESSURE: 78 MMHG | SYSTOLIC BLOOD PRESSURE: 138 MMHG

## 2017-01-14 VITALS — SYSTOLIC BLOOD PRESSURE: 144 MMHG | DIASTOLIC BLOOD PRESSURE: 79 MMHG

## 2017-01-14 LAB
ALBUMIN/GLOB SERPL: 0.6 {RATIO} (ref 1–2.7)
ALT SERPL-CCNC: 9 U/L (ref 3–41)
ANION GAP SERPL CALC-SCNC: 12 MMOL/L (ref 5–15)
AST SERPL-CCNC: 15 U/L (ref 5–40)
CALCIUM SERPL-MCNC: 7.9 MG/DL (ref 8.6–10.2)
CHLORIDE SERPL-SCNC: 102 MEQ/L (ref 98–107)
CO2 SERPL-SCNC: 28 MEQ/L (ref 20–30)
CREAT SERPL-MCNC: 5.2 MG/DL (ref 0.7–1.2)
GFR SERPLBLD BASED ON 1.73 SQ M-ARVRAT: 14.5 ML/MIN (ref 60–?)
GLOBULIN SER-MCNC: 3.6 G/DL
HEMOLYSIS: 0
PHOSPHATE SERPL-MCNC: 6.3 MG/DL (ref 2.5–4.8)
POTASSIUM SERPL-SCNC: 4 MEQ/L (ref 3.4–4.9)
PROT SERPL-MCNC: 5.9 G/DL (ref 6.6–8.7)
SODIUM SERPL-SCNC: 142 MEQ/L (ref 135–145)

## 2017-01-14 RX ADMIN — SODIUM CHLORIDE SCH MLS/HR: 0.9 INJECTION INTRAVENOUS at 09:00

## 2017-01-14 RX ADMIN — TAMSULOSIN HYDROCHLORIDE SCH MG: 0.4 CAPSULE ORAL at 17:02

## 2017-01-14 RX ADMIN — DOCUSATE SODIUM SCH MG: 100 CAPSULE, LIQUID FILLED ORAL at 17:30

## 2017-01-14 RX ADMIN — DOCUSATE SODIUM SCH MG: 100 CAPSULE, LIQUID FILLED ORAL at 13:38

## 2017-01-14 RX ADMIN — MORPHINE SULFATE PRN MG: 4 INJECTION INTRAVENOUS at 15:35

## 2017-01-14 RX ADMIN — HEPARIN SODIUM SCH UNITS: 5000 INJECTION INTRAVENOUS; SUBCUTANEOUS at 09:09

## 2017-01-14 RX ADMIN — TAMSULOSIN HYDROCHLORIDE SCH MG: 0.4 CAPSULE ORAL at 09:04

## 2017-01-14 RX ADMIN — HEPARIN SODIUM SCH UNITS: 5000 INJECTION INTRAVENOUS; SUBCUTANEOUS at 20:12

## 2017-01-14 RX ADMIN — DOCUSATE SODIUM SCH MG: 100 CAPSULE, LIQUID FILLED ORAL at 09:05

## 2017-01-14 RX ADMIN — MORPHINE SULFATE PRN MG: 4 INJECTION INTRAVENOUS at 06:55

## 2017-01-14 NOTE — GENERAL PROGRESS NOTE
Assessment/Plan


Status:  other - unclear status as refusing blood work.


Assessment/Plan


Status:





Cellulitis left leg- Sepsis, received Amikacin and Vanco in the past.


Acute renal failure-


HypoAlbuminemia , Proteinuria , ? NS


Multi drug abuse








Plan:





Refusing catheter change and blood work.


On Norvasc-


On Renagel-


Change Dialysis cath- refused


check 24 h urine for protein, les than 1 gram


On  flomax-





Vanco level- noted





Urine studies- Urine Tox screen- MJ and Amphetamines


Monitor renal parameters- 


Dialysed  1/11 


Per orders





Subjective


ROS Limited/Unobtainable:  No


Allergies:  


Coded Allergies:  


     SULFUR (Unverified  Allergy, Unknown, unk, 1/7/17)


 Per patient allergic to sulfur





Objective





Last 24 Hour Vital Signs








  Date Time  Temp Pulse Resp B/P Pulse Ox O2 Delivery O2 Flow Rate FiO2


 


1/14/17 09:04  77  152/91    


 


1/14/17 08:22 97.7 77 15 152/91 98 Nasal Cannula  


 


1/14/17 00:00 98.1 83 18 138/78 97 Nasal Cannula 2.0 


 


1/13/17 20:00 97.9 90 18 156/87 97 Room Air  


 


1/13/17 18:28  90  168/92    


 


1/13/17 16:00 98.2 90 18 168/92 93 Room Air  

















Intake and Output  


 


 1/13/17 1/14/17





 19:00 07:00


 


Intake Total 480 ml 370 ml


 


Output Total  700 ml


 


Balance 480 ml -330 ml


 


  


 


Intake Oral 480 ml 320 ml


 


IV Total  50 ml


 


Output Urine Total  700 ml


 


# Voids  4








Height (Feet):  6


Height (Inches):  1.00


Weight (Pounds):  200


General Appearance:  no apparent distress


Objective


no change in PE











ALISON ROTHMAN Jan 14, 2017 11:08

## 2017-01-14 NOTE — PULMONOLOGY PROGRESS NOTE
Assessment/Plan


Problems:  


(1) Sepsis


(2) Cellulitis


(3) ATN (acute tubular necrosis)


Assessment/Plan


afebrile


no new complains





legs looks much better


continue antibiotics





waiting to see if pt will need Hd





Subjective


ROS Limited/Unobtainable:  Yes


Constitutional:  Reports: anorexia, chills, fatigue, fever


Allergies:  


Coded Allergies:  


     SULFUR (Unverified  Allergy, Unknown, unk, 1/7/17)


 Per patient allergic to sulfur





Objective





Last 24 Hour Vital Signs








  Date Time  Temp Pulse Resp B/P Pulse Ox O2 Delivery O2 Flow Rate FiO2


 


1/14/17 09:04  77  152/91    


 


1/14/17 08:22 97.7 77 15 152/91 98 Nasal Cannula  


 


1/14/17 00:00 98.1 83 18 138/78 97 Nasal Cannula 2.0 


 


1/13/17 20:00 97.9 90 18 156/87 97 Room Air  


 


1/13/17 18:28  90  168/92    


 


1/13/17 16:00 98.2 90 18 168/92 93 Room Air  

















Intake and Output  


 


 1/13/17 1/14/17





 19:00 07:00


 


Intake Total 480 ml 370 ml


 


Output Total  700 ml


 


Balance 480 ml -330 ml


 


  


 


Intake Oral 480 ml 320 ml


 


IV Total  50 ml


 


Output Urine Total  700 ml


 


# Voids  4








General Appearance:  no acute distress


HEENT:  normocephalic, atraumatic, PERRL


Respiratory/Chest:  chest wall non-tender, decreased breath sounds, accessory 

muscle use, crackles/rales


Cardiovascular:  normal peripheral pulses, normal rate, regular rhythm, no JVD


Abdomen:  normal bowel sounds, soft, non tender, no organomegaly


Genitourinary:  normal external genitalia


Extremities:  no cyanosis


Skin:  no rash, no lesions


Neurologic/Psychiatric:  CNs II-XII grossly normal, responsive, disoriented, 

depressed affect





Current Medications








 Medications


  (Trade)  Dose


 Ordered  Sig/Kathleen


 Route


 PRN Reason  Start Time


 Stop Time Status Last Admin


Dose Admin


 


 Acetaminophen


  (Tylenol)  650 mg  Q4H  PRN


 ORAL


 fever  1/2/17 23:45


 2/1/17 23:44  1/7/17 02:46


 


 


 Amlodipine


 Besylate


  (Norvasc)  5 mg  BID


 ORAL


   1/13/17 18:00


 2/12/17 17:59  1/14/17 09:04


 


 


 Dextrose


  (Dextrose 50%)    STAT  PRN


 IV


 Hypoglycemia  1/2/17 23:45


 2/1/17 23:44   


 


 


 Docusate Sodium


  (Colace)  100 mg  THREE TIMES A  DAY


 ORAL


   1/13/17 13:00


 2/12/17 12:59  1/14/17 09:05


 


 


 Doxycycline


 Monohydrate


  (Vibramycin)  100 mg  EVERY 12  HOURS


 ORAL


   1/14/17 21:00


 1/21/17 20:59   


 


 


 Heparin Sodium


  (Porcine)


  (Heparin 5000


 units/ml)  5,000 units  EVERY 12  HOURS


 SUBQ


   1/3/17 09:00


 2/2/17 08:59  1/14/17 09:09


 


 


 Morphine Sulfate


  (Morphine


 Sulfate)  4 mg  Q4H  PRN


 IVP


 For Pain  1/11/17 22:00


 1/18/17 21:59  1/14/17 06:55


 


 


 Ondansetron HCl


  (Zofran)  4 mg  Q6H  PRN


 IVP


 Nausea & Vomiting  1/2/17 23:45


 2/1/17 23:44   


 


 


 Polyethylene


 Glycol


  (Miralax)  17 gm  HSPRN  PRN


 ORAL


 Constipation  1/2/17 23:45


 2/1/17 23:44   


 


 


 Sevelamer


 Carbonate


  (Renvela)  1,600 mg  THREE TIMES A  DAY


 ORAL


   1/13/17 13:00


 2/12/17 12:59  1/14/17 09:04


 


 


 Tamsulosin HCl


  (Flomax)  0.4 mg  BID


 ORAL


   1/7/17 11:30


 2/6/17 11:29  1/14/17 09:04


 


 


 Zolpidem Tartrate


  (Ambien)  5 mg  HSPRN  PRN


 ORAL


 Insomnia  1/2/17 23:45


 2/1/17 23:44   


 

















GE WIN Jan 14, 2017 11:47

## 2017-01-14 NOTE — GENERAL PROGRESS NOTE
Progress Note


Progress Note


pt is followed for left leg cellulitis. 





Feels better. states he is ambulationg.


Less pain.   


Had developed azotemia that is starting to resolve





Labs








Test


  1/13/17


05:00


 


White Blood Count


  8.6 K/UL


(4.8-10.8)


 


Red Blood Count


  3.04 M/UL


(4.70-6.10)


 


Hemoglobin


  8.9 G/DL


(14.2-18.0)


 


Hematocrit


  27.6 %


(42.0-52.0)


 


Mean Corpuscular Volume 91 FL (80-99) 


 


Mean Corpuscular Hemoglobin


  29.3 PG


(27.0-31.0)


 


Mean Corpuscular Hemoglobin


Concent 32.3 G/DL


(32.0-36.0)


 


Red Cell Distribution Width


  11.9 %


(11.6-14.8)


 


Platelet Count


  402 K/UL


(150-450)


 


Mean Platelet Volume


  4.8 FL


(6.5-10.1)


 


Neutrophils (%) (Auto)


  64.8 %


(45.0-75.0)


 


Lymphocytes (%) (Auto)


  17.2 %


(20.0-45.0)


 


Monocytes (%) (Auto)


  12.8 %


(1.0-10.0)


 


Eosinophils (%) (Auto)


  4.3 %


(0.0-3.0)


 


Basophils (%) (Auto)


  0.9 %


(0.0-2.0)


 


Sodium Level


  142 mEQ/L


(135-145)


 


Potassium Level


  4.4 mEQ/L


(3.4-4.9)


 


Chloride Level


  103 mEQ/L


()


 


Carbon Dioxide Level


  26 mEQ/L


(20-30)


 


Anion Gap 13 (5-15) 


 


Blood Urea Nitrogen


  36 mg/dL


(7-23)


 


Creatinine


  4.8 mg/dL


(0.7-1.2)


 


Estimat Glomerular Filtration


Rate 16.0 mL/min


(>60)


 


Glucose Level


  93 mg/dL


()


 


Uric Acid


  8.2 mg/dL


(3.0-7.5)


 


Calcium Level


  7.8 mg/dL


(8.6-10.2)


 


Phosphorus Level


  6.9 mg/dL


(2.5-4.8)


 


Iron Level


  29 ug/dL


()


 


Total Iron Binding Capacity


  158 ug/dL


(250-400)


 


Percent Iron Saturation 18 % (15-50) 


 


Unsaturated Iron Binding


  129 ug/dL


(112-346)


 


Ferritin


  216 ng/mL


()


 


Total Bilirubin


  0.2 mg/dL


(0.0-1.2)


 


Gamma Glutamyl Transpeptidase 18 U/L (8-61) 


 


Aspartate Amino Transf


(AST/SGOT) 18 U/L (5-40) 


 


 


Alanine Aminotransferase


(ALT/SGPT) 10 U/L (3-41) 


 


 


Alkaline Phosphatase


  49 U/L


()


 


C-Reactive Protein,


Quantitative 4.9 mg/dL (<


0.5)


 


Pro-B-Type Natriuretic Peptide


  5032 pg/mL


(0-125)


 


Total Protein


  5.4 g/dL


(6.6-8.7)


 


Albumin


  2.2 g/dL


(3.5-5.2)


 


Globulin 3.2 g/dL 


 


Albumin/Globulin Ratio 0.6 (1.0-2.7) 


 


Vitamin B12 Level


  366 pg/mL


(211-946)











Last 24 Hour Vital Signs








  Date Time  Temp Pulse Resp B/P Pulse Ox O2 Delivery O2 Flow Rate FiO2


 


1/14/17 00:00 98.1 83 18 138/78 97 Nasal Cannula 2.0 


 


1/13/17 20:00 97.9 90 18 156/87 97 Room Air  


 


1/13/17 18:28  90  168/92    


 


1/13/17 16:00 98.2 90 18 168/92 93 Room Air  


 


1/13/17 08:59  97  140/88    


 


1/13/17 08:03 97.1  18 140/77 89 Nasal Cannula 2.0 





left calf still mildly erythematous. nontender, no tenseness to suggest


compartment syndrome.  Eschar at site of previous blister.





Surgically stable.  Will check labs











LASHELL HURD Jan 14, 2017 07:30

## 2017-01-14 NOTE — CARDIOLOGY REPORT
--------------- APPROVED REPORT --------------





EXAM: Two-dimensional and M-mode echocardiogram with Doppler and color Doppler.



M-Mode DIMENSIONS 

IVSd1.4 (0.7-1.1cm)Left Atrium (MM)3.9 (1.6-4.0cm)

LVDd4.3 (3.5-5.6cm)Aortic Root3.0 (2.0-3.7cm)

PWd1.3 (0.7-1.1cm)Aortic Cusp Exc.1.9 (1.5-2.0cm)



LVDs2.7 (2.5-4.0cm)

PWs1.8 cm





Technically difficult study due lungs.

Normal left ventricular chamber size, systolic function and wall motion.

Left ventricular ejection fraction estimated to be 65%.

RV appears mildley hypokinetic on some veiws only 

Mild left ventricular hypertrophy.

Mild Bi-atrial enlargment seen in 2D.

Focal aortic valve sclerosis with adequate cusp excursion.

Thickened mitral valve leaflets with normal excursion.

Pulmonic valve well visualized.

Normal tricuspid valve structure.

IVC dilated at 2.5cm with physiologic collapse.



A  color flow and spectral Doppler study was performed and revealed:

No aortic regurgitation.

Mild mitral regurgitation.

Mitral inflow velocities indicate normal diastolic dysfunction.

Mild-moderate tricuspid regurgitation.

Tricuspid systolic velocities suggests peak right ventricular systolic pressure of 47 

mmHg. Consistent with moderate pulmonary hypertension. 

No pulmonic regurgitation present.

## 2017-01-15 VITALS — DIASTOLIC BLOOD PRESSURE: 85 MMHG | SYSTOLIC BLOOD PRESSURE: 149 MMHG

## 2017-01-15 VITALS — SYSTOLIC BLOOD PRESSURE: 142 MMHG | DIASTOLIC BLOOD PRESSURE: 82 MMHG

## 2017-01-15 VITALS — SYSTOLIC BLOOD PRESSURE: 150 MMHG | DIASTOLIC BLOOD PRESSURE: 58 MMHG

## 2017-01-15 VITALS — DIASTOLIC BLOOD PRESSURE: 77 MMHG | SYSTOLIC BLOOD PRESSURE: 132 MMHG

## 2017-01-15 VITALS — SYSTOLIC BLOOD PRESSURE: 140 MMHG | DIASTOLIC BLOOD PRESSURE: 78 MMHG

## 2017-01-15 VITALS — DIASTOLIC BLOOD PRESSURE: 91 MMHG | SYSTOLIC BLOOD PRESSURE: 145 MMHG

## 2017-01-15 RX ADMIN — MORPHINE SULFATE PRN MG: 4 INJECTION INTRAVENOUS at 00:33

## 2017-01-15 RX ADMIN — TAMSULOSIN HYDROCHLORIDE SCH MG: 0.4 CAPSULE ORAL at 17:28

## 2017-01-15 RX ADMIN — DOCUSATE SODIUM SCH MG: 100 CAPSULE, LIQUID FILLED ORAL at 09:45

## 2017-01-15 RX ADMIN — HEPARIN SODIUM SCH UNITS: 5000 INJECTION INTRAVENOUS; SUBCUTANEOUS at 09:45

## 2017-01-15 RX ADMIN — TAMSULOSIN HYDROCHLORIDE SCH MG: 0.4 CAPSULE ORAL at 09:50

## 2017-01-15 RX ADMIN — HEPARIN SODIUM SCH UNITS: 5000 INJECTION INTRAVENOUS; SUBCUTANEOUS at 20:09

## 2017-01-15 RX ADMIN — DOCUSATE SODIUM SCH MG: 100 CAPSULE, LIQUID FILLED ORAL at 17:28

## 2017-01-15 RX ADMIN — MORPHINE SULFATE PRN MG: 4 INJECTION INTRAVENOUS at 17:34

## 2017-01-15 RX ADMIN — DOCUSATE SODIUM SCH MG: 100 CAPSULE, LIQUID FILLED ORAL at 13:00

## 2017-01-15 NOTE — GENERAL PROGRESS NOTE
Assessment/Plan


Status:  unchanged


Status Narrative


Cr rising


Assessment/Plan


Status:





Cellulitis left leg- Sepsis, received Amikacin and Vanco in the past.


Acute renal failure- 


HypoAlbuminemia , Proteinuria , ? NS


Multi drug abuse








Plan:





Refusing " catheter change"  and blood work. retry in am


On Norvasc-


On Renagel-


Change Dialysis cath- refused


On  flomax-





Urine studies- Urine Tox screen- MJ and Amphetamines


Monitor renal parameters- 


Dialysed  1/11 


Per orders





Subjective


ROS Limited/Unobtainable:  No


Constitutional:  Reports: malaise, weakness


Allergies:  


Coded Allergies:  


     SULFUR (Unverified  Allergy, Unknown, unk, 1/7/17)


 Per patient allergic to sulfur





Objective





Last 24 Hour Vital Signs








  Date Time  Temp Pulse Resp B/P Pulse Ox O2 Delivery O2 Flow Rate FiO2


 


1/15/17 10:46  92 18   Nasal Cannula 2.0 28


 


1/15/17 09:45  88  140/77    


 


1/15/17 08:00 97.7 92 20 145/91 94 Nasal Cannula 2.0 


 


1/15/17 04:00 97.7 88 18 150/58 94 Nasal Cannula 2.0 


 


1/15/17 00:00 98.1 101 20 142/82 94 Nasal Cannula 2.0 


 


1/14/17 20:00 98.1 90 18 144/79 92 Room Air  


 


1/14/17 17:02  89  139/83    


 


1/14/17 16:44 98.0 89 15 139/83 92 Room Air  


 


1/14/17 16:05 97.3       


 


1/14/17 11:55 97.3 88 16 159/90 96 Room Air  

















Intake and Output  


 


 1/14/17 1/15/17





 19:00 07:00


 


Intake Total 1700 ml 630 ml


 


Output Total 950 ml 


 


Balance 750 ml 630 ml


 


  


 


Intake Oral 1700 ml 630 ml


 


Output Urine Total 950 ml 


 


# Voids  2








Laboratory Tests


1/14/17 12:00: 


Sodium Level 142, Potassium Level 4.0, Chloride Level 102, Carbon Dioxide Level 

28, Anion Gap 12, Blood Urea Nitrogen 34H, Creatinine 5.2H, Estimat Glomerular 

Filtration Rate 14.5, Glucose Level 107H, Calcium Level 7.9L, Phosphorus Level 

6.3H, Magnesium Level 2.1, Total Bilirubin < 0.2, Aspartate Amino Transf (AST/

SGOT) 15, Alanine Aminotransferase (ALT/SGPT) 9, Alkaline Phosphatase 51, Total 

Protein 5.9L, Albumin 2.3L, Globulin 3.6, Albumin/Globulin Ratio 0.6L


Height (Feet):  6


Height (Inches):  1.00


Weight (Pounds):  200


General Appearance:  no apparent distress


Objective


no change in PE











ALISON ROTHMAN Delbert 15, 2017 10:59

## 2017-01-15 NOTE — GENERAL PROGRESS NOTE
Progress Note


Progress Note


pt followed by us for left leg edema to rule out compartment synddrome or 

abscess.





Has ad progressilve improvement of edema, still with minimal erythema.no signs 

of abscess


Labs








Test


  1/14/17


12:00


 


Sodium Level


  142 mEQ/L


(135-145)


 


Potassium Level


  4.0 mEQ/L


(3.4-4.9)


 


Chloride Level


  102 mEQ/L


()


 


Carbon Dioxide Level


  28 mEQ/L


(20-30)


 


Anion Gap 12 (5-15) 


 


Blood Urea Nitrogen


  34 mg/dL


(7-23)


 


Creatinine


  5.2 mg/dL


(0.7-1.2)


 


Estimat Glomerular Filtration


Rate 14.5 mL/min


(>60)


 


Glucose Level


  107 mg/dL


()


 


Calcium Level


  7.9 mg/dL


(8.6-10.2)


 


Phosphorus Level


  6.3 mg/dL


(2.5-4.8)


 


Magnesium Level


  2.1 mg/dL


(1.7-2.5)


 


Total Bilirubin


  < 0.2 mg/dL


(0.0-1.2)


 


Aspartate Amino Transf


(AST/SGOT) 15 U/L (5-40) 


 


 


Alanine Aminotransferase


(ALT/SGPT) 9 U/L (3-41) 


 


 


Alkaline Phosphatase


  51 U/L


()


 


Total Protein


  5.9 g/dL


(6.6-8.7)


 


Albumin


  2.3 g/dL


(3.5-5.2)


 


Globulin 3.6 g/dL 


 


Albumin/Globulin Ratio 0.6 (1.0-2.7) 











has had azotemia that is still present.


\


no acute surgical concerns at present.











LASHELL HURD Delbert 15, 2017 07:48

## 2017-01-15 NOTE — INFECTIOUS DISEASES PROG NOTE
Assessment/Plan


Assessment/Plan





ASSESSMENT:   46-year-old male with:





Left lower extremity cellulitis - improved


   MRI : no evidence of associated abscess, fasciitis or osteomyelitis


   Doppler : No DVT 


Fever - resolved 


Leucocytosis - resolved 








ARF SP rupinder 1/9, initiated HD


   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 Medical renal disease suspected.


Polysubstance abuse - UDS(+) Amphetamines, opiates, marijuana, tobacco


Sulfa allergy


Full Code








PLAN:





ok to DC on PO doxycycline x4d from ID standpoint - Rx on chart.  


 ( 1/14 SP Ceftaroline  d# 9 )


 ( 1/5 SP on  cefepime and  vancomycin d# 4 ) 


Monitor CBC, temperatures


Monitor BMP.


HD prn





Subjective


Allergies:  


Coded Allergies:  


     SULFUR (Unverified  Allergy, Unknown, unk, 1/7/17)


 Per patient allergic to sulfur


Subjective





remains afebrile. improved


refused IV ABX, HD cath change





Objective


Vital Signs





Last 24 Hour Vital Signs








  Date Time  Temp Pulse Resp B/P Pulse Ox O2 Delivery O2 Flow Rate FiO2


 


1/15/17 08:00 97.7 92 20 145/91 94 Nasal Cannula 2.0 


 


1/15/17 04:00 97.7 88 18 150/58 94 Nasal Cannula 2.0 


 


1/15/17 00:00 98.1 101 20 142/82 94 Nasal Cannula 2.0 


 


1/14/17 20:00 98.1 90 18 144/79 92 Room Air  


 


1/14/17 17:02  89  139/83    


 


1/14/17 16:44 98.0 89 15 139/83 92 Room Air  


 


1/14/17 16:05 97.3       


 


1/14/17 11:55 97.3 88 16 159/90 96 Room Air  








Height (Feet):  6


Height (Inches):  1.00


Weight (Pounds):  200


General Appearance:  no acute distress


Respiratory/Chest:  no respiratory distress


Cardiovascular:  normal rate, regular rhythm


Abdomen:  normal bowel sounds, soft, non tender, non distended


Extremities:  other - edema





Laboratory Tests








Test


  1/14/17


12:00


 


Sodium Level


  142 mEQ/L


(135-145)


 


Potassium Level


  4.0 mEQ/L


(3.4-4.9)


 


Chloride Level


  102 mEQ/L


()


 


Carbon Dioxide Level


  28 mEQ/L


(20-30)


 


Anion Gap 12 (5-15)  


 


Blood Urea Nitrogen


  34 mg/dL


(7-23)  H


 


Creatinine


  5.2 mg/dL


(0.7-1.2)  H


 


Estimat Glomerular Filtration


Rate 14.5 mL/min


(>60)


 


Glucose Level


  107 mg/dL


()  H


 


Calcium Level


  7.9 mg/dL


(8.6-10.2)  L


 


Phosphorus Level


  6.3 mg/dL


(2.5-4.8)  H


 


Magnesium Level


  2.1 mg/dL


(1.7-2.5)


 


Total Bilirubin


  < 0.2 mg/dL


(0.0-1.2)


 


Aspartate Amino Transf


(AST/SGOT) 15 U/L (5-40)  


 


 


Alanine Aminotransferase


(ALT/SGPT) 9 U/L (3-41)  


 


 


Alkaline Phosphatase


  51 U/L


()


 


Total Protein


  5.9 g/dL


(6.6-8.7)  L


 


Albumin


  2.3 g/dL


(3.5-5.2)  L


 


Globulin 3.6 g/dL  


 


Albumin/Globulin Ratio


  0.6 (1.0-2.7)


L











Current Medications








 Medications


  (Trade)  Dose


 Ordered  Sig/Kathleen


 Route


 PRN Reason  Start Time


 Stop Time Status Last Admin


Dose Admin


 


 Acetaminophen


  (Tylenol)  650 mg  Q4H  PRN


 ORAL


 fever  1/2/17 23:45


 2/1/17 23:44  1/7/17 02:46


 


 


 Albuterol/


 Ipratropium


  (DuoNeb


 0.5-3(2.5)mg/3ml)  3 ml  Q4H  PRN


 HHN


 Shortness of breath  1/14/17 16:00


 1/19/17 15:59   


 


 


 Amlodipine


 Besylate


  (Norvasc)  5 mg  BID


 ORAL


   1/13/17 18:00


 2/12/17 17:59  1/14/17 17:02


 


 


 Dextrose


  (Dextrose 50%)    STAT  PRN


 IV


 Hypoglycemia  1/2/17 23:45


 2/1/17 23:44   


 


 


 Docusate Sodium


  (Colace)  100 mg  THREE TIMES A  DAY


 ORAL


   1/13/17 13:00


 2/12/17 12:59  1/14/17 13:38


 


 


 Doxycycline


 Monohydrate


  (Vibramycin)  100 mg  EVERY 12  HOURS


 ORAL


   1/14/17 21:00


 1/21/17 20:59  1/14/17 20:27


 


 


 Heparin Sodium


  (Porcine)


  (Heparin 5000


 units/ml)  5,000 units  EVERY 12  HOURS


 SUBQ


   1/3/17 09:00


 2/2/17 08:59  1/14/17 09:09


 


 


 Morphine Sulfate


  (Morphine


 Sulfate)  4 mg  Q4H  PRN


 IVP


 For Pain  1/11/17 22:00


 1/18/17 21:59  1/15/17 00:33


 


 


 Ondansetron HCl


  (Zofran)  4 mg  Q6H  PRN


 IVP


 Nausea & Vomiting  1/2/17 23:45


 2/1/17 23:44   


 


 


 Polyethylene


 Glycol


  (Miralax)  17 gm  HSPRN  PRN


 ORAL


 Constipation  1/2/17 23:45


 2/1/17 23:44   


 


 


 Sevelamer


 Carbonate


  (Renvela)  1,600 mg  THREE TIMES A  DAY


 ORAL


   1/13/17 13:00


 2/12/17 12:59  1/14/17 17:02


 


 


 Tamsulosin HCl


  (Flomax)  0.4 mg  BID


 ORAL


   1/7/17 11:30


 2/6/17 11:29  1/14/17 17:02


 


 


 Zolpidem Tartrate


  (Ambien)  5 mg  HSPRN  PRN


 ORAL


 Insomnia  1/2/17 23:45


 2/1/17 23:44   


 

















TANIA FRANCIS Delbert 15, 2017 10:06

## 2017-01-15 NOTE — PULMONOLOGY PROGRESS NOTE
Assessment/Plan


Problems:  


(1) Sepsis


(2) Cellulitis


(3) ATN (acute tubular necrosis)


Assessment/Plan


afebrile


no new complains





legs looks much better


continue antibiotics





waiting to see if pt will need Hd





Subjective


ROS Limited/Unobtainable:  No


Constitutional:  Reports: fatigue, fever


Musculoskeletal:  Reports: pain, stiffness, swelling


Allergies:  


Coded Allergies:  


     SULFUR (Unverified  Allergy, Unknown, unk, 1/7/17)


 Per patient allergic to sulfur





Objective





Last 24 Hour Vital Signs








  Date Time  Temp Pulse Resp B/P Pulse Ox O2 Delivery O2 Flow Rate FiO2


 


1/15/17 12:36 97.8  17 140/78 96 Nasal Cannula  


 


1/15/17 10:46  92 18   Nasal Cannula 2.0 28


 


1/15/17 09:45  88  140/77    


 


1/15/17 08:00 97.7 92 20 145/91 94 Nasal Cannula 2.0 


 


1/15/17 04:00 97.7 88 18 150/58 94 Nasal Cannula 2.0 


 


1/15/17 00:00 98.1 101 20 142/82 94 Nasal Cannula 2.0 


 


1/14/17 20:00 98.1 90 18 144/79 92 Room Air  


 


1/14/17 17:02  89  139/83    


 


1/14/17 16:44 98.0 89 15 139/83 92 Room Air  


 


1/14/17 16:05 97.3       

















Intake and Output  


 


 1/14/17 1/15/17





 19:00 07:00


 


Intake Total 1700 ml 630 ml


 


Output Total 950 ml 


 


Balance 750 ml 630 ml


 


  


 


Intake Oral 1700 ml 630 ml


 


Output Urine Total 950 ml 


 


# Voids  2








General Appearance:  no acute distress


HEENT:  normocephalic, atraumatic, PERRL


Respiratory/Chest:  chest wall non-tender, decreased breath sounds, accessory 

muscle use


Cardiovascular:  normal peripheral pulses, normal rate, regular rhythm, no JVD


Abdomen:  normal bowel sounds, soft, non tender, no organomegaly


Genitourinary:  normal external genitalia


Extremities:  no cyanosis


Skin:  no rash, no lesions


Neurologic/Psychiatric:  CNs II-XII grossly normal, no motor/sensory deficits





Current Medications








 Medications


  (Trade)  Dose


 Ordered  Sig/Kathleen


 Route


 PRN Reason  Start Time


 Stop Time Status Last Admin


Dose Admin


 


 Acetaminophen


  (Tylenol)  650 mg  Q4H  PRN


 ORAL


 fever  1/2/17 23:45


 2/1/17 23:44  1/7/17 02:46


 


 


 Albuterol/


 Ipratropium


  (DuoNeb


 0.5-3(2.5)mg/3ml)  3 ml  Q4H  PRN


 HHN


 Shortness of breath  1/14/17 16:00


 1/19/17 15:59   


 


 


 Amlodipine


 Besylate


  (Norvasc)  5 mg  BID


 ORAL


   1/13/17 18:00


 2/12/17 17:59  1/15/17 09:45


 


 


 Dextrose


  (Dextrose 50%)    STAT  PRN


 IV


 Hypoglycemia  1/2/17 23:45


 2/1/17 23:44   


 


 


 Docusate Sodium


  (Colace)  100 mg  THREE TIMES A  DAY


 ORAL


   1/13/17 13:00


 2/12/17 12:59  1/15/17 09:45


 


 


 Doxycycline


 Monohydrate


  (Vibramycin)  100 mg  EVERY 12  HOURS


 ORAL


   1/14/17 21:00


 1/21/17 20:59  1/15/17 09:45


 


 


 Heparin Sodium


  (Porcine)


  (Heparin 5000


 units/ml)  5,000 units  EVERY 12  HOURS


 SUBQ


   1/3/17 09:00


 2/2/17 08:59  1/15/17 09:45


 


 


 Morphine Sulfate


  (Morphine


 Sulfate)  4 mg  Q4H  PRN


 IVP


 For Pain  1/11/17 22:00


 1/18/17 21:59  1/15/17 00:33


 


 


 Ondansetron HCl


  (Zofran)  4 mg  Q6H  PRN


 IVP


 Nausea & Vomiting  1/2/17 23:45


 2/1/17 23:44   


 


 


 Polyethylene


 Glycol


  (Miralax)  17 gm  HSPRN  PRN


 ORAL


 Constipation  1/2/17 23:45


 2/1/17 23:44   


 


 


 Quetiapine


 Fumarate


  (SEROquel)  25 mg  Q12HR


 ORAL


   1/15/17 11:00


 2/14/17 10:59   


 


 


 Sevelamer


 Carbonate


  (Renvela)  1,600 mg  THREE TIMES A  DAY


 ORAL


   1/13/17 13:00


 2/12/17 12:59  1/14/17 17:02


 


 


 Tamsulosin HCl


  (Flomax)  0.4 mg  BID


 ORAL


   1/7/17 11:30


 2/6/17 11:29  1/14/17 17:02


 


 


 Zolpidem Tartrate


  (Ambien)  5 mg  HSPRN  PRN


 ORAL


 Insomnia  1/2/17 23:45


 2/1/17 23:44   


 

















GE WIN Delbert 15, 2017 13:10

## 2017-01-16 VITALS — SYSTOLIC BLOOD PRESSURE: 138 MMHG | DIASTOLIC BLOOD PRESSURE: 88 MMHG

## 2017-01-16 VITALS — SYSTOLIC BLOOD PRESSURE: 147 MMHG | DIASTOLIC BLOOD PRESSURE: 84 MMHG

## 2017-01-16 VITALS — SYSTOLIC BLOOD PRESSURE: 150 MMHG | DIASTOLIC BLOOD PRESSURE: 90 MMHG

## 2017-01-16 VITALS — SYSTOLIC BLOOD PRESSURE: 158 MMHG | DIASTOLIC BLOOD PRESSURE: 85 MMHG

## 2017-01-16 VITALS — SYSTOLIC BLOOD PRESSURE: 136 MMHG | DIASTOLIC BLOOD PRESSURE: 104 MMHG

## 2017-01-16 VITALS — DIASTOLIC BLOOD PRESSURE: 79 MMHG | SYSTOLIC BLOOD PRESSURE: 163 MMHG

## 2017-01-16 VITALS — DIASTOLIC BLOOD PRESSURE: 85 MMHG | SYSTOLIC BLOOD PRESSURE: 161 MMHG

## 2017-01-16 VITALS — DIASTOLIC BLOOD PRESSURE: 88 MMHG | SYSTOLIC BLOOD PRESSURE: 150 MMHG

## 2017-01-16 VITALS — SYSTOLIC BLOOD PRESSURE: 178 MMHG | DIASTOLIC BLOOD PRESSURE: 105 MMHG

## 2017-01-16 VITALS — SYSTOLIC BLOOD PRESSURE: 150 MMHG | DIASTOLIC BLOOD PRESSURE: 88 MMHG

## 2017-01-16 VITALS — SYSTOLIC BLOOD PRESSURE: 158 MMHG | DIASTOLIC BLOOD PRESSURE: 83 MMHG

## 2017-01-16 VITALS — DIASTOLIC BLOOD PRESSURE: 85 MMHG | SYSTOLIC BLOOD PRESSURE: 148 MMHG

## 2017-01-16 VITALS — DIASTOLIC BLOOD PRESSURE: 83 MMHG | SYSTOLIC BLOOD PRESSURE: 154 MMHG

## 2017-01-16 LAB
ALBUMIN/GLOB SERPL: 0.6 {RATIO} (ref 1–2.7)
ALT SERPL-CCNC: 6 U/L (ref 3–41)
ANION GAP SERPL CALC-SCNC: 14 MMOL/L (ref 5–15)
APTT BLD: 32 SEC (ref 23–33)
AST SERPL-CCNC: 14 U/L (ref 5–40)
BASOPHILS NFR BLD AUTO: 1.9 % (ref 0–2)
CALCIUM SERPL-MCNC: 8.1 MG/DL (ref 8.6–10.2)
CHLORIDE SERPL-SCNC: 102 MEQ/L (ref 98–107)
CO2 SERPL-SCNC: 26 MEQ/L (ref 20–30)
CREAT SERPL-MCNC: 5.8 MG/DL (ref 0.7–1.2)
CRP SERPL-MCNC: 4.2 MG/DL (ref ?–0.5)
EOSINOPHIL NFR BLD AUTO: 3.9 % (ref 0–3)
ERYTHROCYTE [DISTWIDTH] IN BLOOD BY AUTOMATED COUNT: 12.1 % (ref 11.6–14.8)
FOLATE SERPL-MCNC: 11.5 NG/ML (ref 3.1–17.5)
GFR SERPLBLD BASED ON 1.73 SQ M-ARVRAT: 12.8 ML/MIN (ref 60–?)
GLOBULIN SER-MCNC: 3.4 G/DL
HEMOLYSIS: 2
INR PPP: 1.1 (ref 0.9–1.1)
LYMPHOCYTES NFR BLD AUTO: 21.5 % (ref 20–45)
MAGNESIUM SERPL-MCNC: 1.9 MG/DL (ref 1.7–2.5)
MCH RBC QN AUTO: 30.3 PG (ref 27–31)
MCHC RBC AUTO-ENTMCNC: 33.5 G/DL (ref 32–36)
MCV RBC AUTO: 91 FL (ref 80–99)
MONOCYTES NFR BLD AUTO: 10.9 % (ref 1–10)
NEUTROPHILS NFR BLD AUTO: 61.7 % (ref 45–75)
PHOSPHATE SERPL-MCNC: 6 MG/DL (ref 2.5–4.8)
PLATELET # BLD: 401 K/UL (ref 150–450)
PMV BLD AUTO: 4.6 FL (ref 6.5–10.1)
POTASSIUM SERPL-SCNC: 4.2 MEQ/L (ref 3.4–4.9)
PROT SERPL-MCNC: 5.7 G/DL (ref 6.6–8.7)
PROTHROMBIN TIME: 11.1 SEC (ref 9.3–11.5)
RBC # BLD AUTO: 2.89 M/UL (ref 4.7–6.1)
SODIUM SERPL-SCNC: 142 MEQ/L (ref 135–145)
URATE SERPL-MCNC: 8.3 MG/DL (ref 3–7.5)
WBC # BLD AUTO: 8.7 K/UL (ref 4.8–10.8)

## 2017-01-16 PROCEDURE — B518ZZA FLUOROSCOPY OF SUPERIOR VENA CAVA, GUIDANCE: ICD-10-PCS

## 2017-01-16 PROCEDURE — 02HV33Z INSERTION OF INFUSION DEVICE INTO SUPERIOR VENA CAVA, PERCUTANEOUS APPROACH: ICD-10-PCS

## 2017-01-16 RX ADMIN — HEPARIN SODIUM SCH UNITS: 5000 INJECTION INTRAVENOUS; SUBCUTANEOUS at 22:12

## 2017-01-16 RX ADMIN — DOCUSATE SODIUM SCH MG: 100 CAPSULE, LIQUID FILLED ORAL at 13:27

## 2017-01-16 RX ADMIN — TAMSULOSIN HYDROCHLORIDE SCH MG: 0.4 CAPSULE ORAL at 08:49

## 2017-01-16 RX ADMIN — HEPARIN SODIUM SCH UNITS: 5000 INJECTION INTRAVENOUS; SUBCUTANEOUS at 08:53

## 2017-01-16 RX ADMIN — DOCUSATE SODIUM SCH MG: 100 CAPSULE, LIQUID FILLED ORAL at 08:49

## 2017-01-16 RX ADMIN — DOCUSATE SODIUM SCH MG: 100 CAPSULE, LIQUID FILLED ORAL at 18:00

## 2017-01-16 RX ADMIN — TAMSULOSIN HYDROCHLORIDE SCH MG: 0.4 CAPSULE ORAL at 22:10

## 2017-01-16 RX ADMIN — TAMSULOSIN HYDROCHLORIDE SCH MG: 0.4 CAPSULE ORAL at 18:00

## 2017-01-16 RX ADMIN — MORPHINE SULFATE PRN MG: 4 INJECTION INTRAVENOUS at 00:33

## 2017-01-16 RX ADMIN — MORPHINE SULFATE PRN MG: 4 INJECTION INTRAVENOUS at 22:22

## 2017-01-16 NOTE — GENERAL SURGERY PROGRESS NOTE
General Surgery-Progress Note


Subjective


Reason for Consult


left leg cellulitis


Symptoms:  improved


Additional Comments


patient seen and examined.  left leg significantly improved.  renal function 

still poor.





Objective





Last 24 Hour Vital Signs








  Date Time  Temp Pulse Resp B/P Pulse Ox O2 Delivery O2 Flow Rate FiO2


 


1/16/17 08:47  88  150/88    


 


1/16/17 07:58 98.1 88 20 150/88 98 Nasal Cannula 2.0 


 


1/16/17 04:00 98.6 90 20 138/88 91 Room Air  


 


1/16/17 00:00 98.2 95 20 147/84 90 Room Air  


 


1/15/17 20:00 98.1 95 18 149/85 92 Room Air  


 


1/15/17 19:30  90 20   Room Air  21


 


1/15/17 18:04 99.1       


 


1/15/17 17:28  98  132/77    


 


1/15/17 16:42 99.1 98 16 132/77 95 Room Air  


 


1/15/17 12:36 97.8  17 140/78 96 Nasal Cannula  


 


1/15/17 10:46  92 18   Nasal Cannula 2.0 28








I&O











Intake and Output  


 


 1/15/17 1/16/17





 19:00 07:00


 


Intake Total 1075 ml 400 ml


 


Output Total 300 ml 360 ml


 


Balance 775 ml 40 ml


 


  


 


Intake Oral 1075 ml 400 ml


 


Output Urine Total 300 ml 360 ml


 


# Voids 3 2








Dressing:  dry


Wound:  clean


Drains:  none


Cardiovascular:  RSR


Respiratory:  clear


Abdomen:  soft, non-tender


Extremities:  no edema, pulses





Laboratory Tests








Test


  1/16/17


05:15 1/16/17


08:55


 


White Blood Count


  8.7 K/UL


(4.8-10.8) 


 


 


Red Blood Count


  2.89 M/UL


(4.70-6.10)  L 


 


 


Hemoglobin


  8.8 G/DL


(14.2-18.0)  L 


 


 


Hematocrit


  26.2 %


(42.0-52.0)  L 


 


 


Mean Corpuscular Volume 91 FL (80-99)   


 


Mean Corpuscular Hemoglobin


  30.3 PG


(27.0-31.0) 


 


 


Mean Corpuscular Hemoglobin


Concent 33.5 G/DL


(32.0-36.0) 


 


 


Red Cell Distribution Width


  12.1 %


(11.6-14.8) 


 


 


Platelet Count


  401 K/UL


(150-450) 


 


 


Mean Platelet Volume


  4.6 FL


(6.5-10.1)  L 


 


 


Neutrophils (%) (Auto)


  61.7 %


(45.0-75.0) 


 


 


Lymphocytes (%) (Auto)


  21.5 %


(20.0-45.0) 


 


 


Monocytes (%) (Auto)


  10.9 %


(1.0-10.0)  H 


 


 


Eosinophils (%) (Auto)


  3.9 %


(0.0-3.0)  H 


 


 


Basophils (%) (Auto)


  1.9 %


(0.0-2.0) 


 


 


Prothrombin Time


  11.1 SEC


(9.30-11.50) 


 


 


Prothromb Time International


Ratio 1.1 (0.9-1.1)  


  


 


 


Activated Partial


Thromboplast Time 32 SEC (23-33)


  


 


 


Sodium Level


  142 mEQ/L


(135-145) 


 


 


Potassium Level


  4.2 mEQ/L


(3.4-4.9) 


 


 


Chloride Level


  102 mEQ/L


() 


 


 


Carbon Dioxide Level


  26 mEQ/L


(20-30) 


 


 


Anion Gap 14 (5-15)   


 


Blood Urea Nitrogen


  38 mg/dL


(7-23)  H 


 


 


Creatinine


  5.8 mg/dL


(0.7-1.2)  H 


 


 


Estimat Glomerular Filtration


Rate 12.8 mL/min


(>60) 


 


 


Glucose Level


  92 mg/dL


() 


 


 


Uric Acid


  8.3 mg/dL


(3.0-7.5)  H 


 


 


Calcium Level


  8.1 mg/dL


(8.6-10.2)  L 


 


 


Phosphorus Level


  6.0 mg/dL


(2.5-4.8)  H 


 


 


Magnesium Level


  1.9 mg/dL


(1.7-2.5) 


 


 


Total Bilirubin


  0.3 mg/dL


(0.0-1.2) 


 


 


Aspartate Amino Transf


(AST/SGOT) 14 U/L (5-40)  


  


 


 


Alanine Aminotransferase


(ALT/SGPT) 6 U/L (3-41)  


  


 


 


Alkaline Phosphatase


  48 U/L


() 


 


 


C-Reactive Protein,


Quantitative 4.2 mg/dL (<


0.5)  H 


 


 


Pro-B-Type Natriuretic Peptide


  5189 pg/mL


(0-125)  H 


 


 


Total Protein


  5.7 g/dL


(6.6-8.7)  L 


 


 


Albumin


  2.3 g/dL


(3.5-5.2)  L 


 


 


Globulin 3.4 g/dL   


 


Albumin/Globulin Ratio


  0.6 (1.0-2.7)


L 


 


 


Hepatitis A IgM Antibody  Pending  


 


Hepatitis B Surface Antigen  Pending  


 


Hepatitis B Core IgM Antibody  Pending  


 


Hepatitis C Antibody  Pending  











Assessment


Post-op Diagnosis


46 year old male with left lower extremity cellulitis that is now resolved.  

wounds clean and dry and leg healing.  renal function declined during hospital 

course and required dialysis.  will hopefully recover from renal aspect soon 

too.





Plan


Problems:  


(1) Cellulitis


Assessment & Plan:  46 M left leg cellulitis.  improving.  no signs of 

infection at this time in leg.  edema resolved.  small area of blister now just 

a scab.  currently no surgical issues.  





(2) Sepsis











Jaiden Patel MD Jan 16, 2017 10:37

## 2017-01-16 NOTE — GENERAL PROGRESS NOTE
Assessment/Plan


Status:  unchanged, deteriorating


Assessment/Plan


Status:





Cellulitis left leg- Sepsis, received Amikacin and Vanco in the past.


Acute renal failure- 


HypoAlbuminemia , Proteinuria , ? NS


Multi drug abuse








Plan:





been Refusing " catheter change"  and blood work. 


will try change of cath today and dialyse-


On Norvasc-


On Renagel-


On  flomax-





Urine studies- Urine Tox screen- MJ and Amphetamines


Monitor renal parameters- 


Dialysed  1/11 


Per orders





Subjective


ROS Limited/Unobtainable:  No


Constitutional:  Reports: malaise


Allergies:  


Coded Allergies:  


     SULFUR (Unverified  Allergy, Unknown, unk, 1/7/17)


 Per patient allergic to sulfur





Objective





Last 24 Hour Vital Signs








  Date Time  Temp Pulse Resp B/P Pulse Ox O2 Delivery O2 Flow Rate FiO2


 


1/16/17 08:47  88  150/88    


 


1/16/17 07:58 98.1 88 20 150/88 98 Nasal Cannula 2.0 


 


1/16/17 04:00 98.6 90 20 138/88 91 Room Air  


 


1/16/17 00:00 98.2 95 20 147/84 90 Room Air  


 


1/15/17 20:00 98.1 95 18 149/85 92 Room Air  


 


1/15/17 19:30  90 20   Room Air  21


 


1/15/17 18:04 99.1       


 


1/15/17 17:28  98  132/77    


 


1/15/17 16:42 99.1 98 16 132/77 95 Room Air  


 


1/15/17 12:36 97.8  17 140/78 96 Nasal Cannula  


 


1/15/17 10:46  92 18   Nasal Cannula 2.0 28


 


1/15/17 09:45  88  140/77    

















Intake and Output  


 


 1/15/17 1/16/17





 19:00 07:00


 


Intake Total 1075 ml 400 ml


 


Output Total 300 ml 360 ml


 


Balance 775 ml 40 ml


 


  


 


Intake Oral 1075 ml 400 ml


 


Output Urine Total 300 ml 360 ml


 


# Voids 3 2








Laboratory Tests


1/16/17 05:15: 


White Blood Count 8.7, Red Blood Count 2.89L, Hemoglobin 8.8L, Hematocrit 26.2L

, Mean Corpuscular Volume 91, Mean Corpuscular Hemoglobin 30.3, Mean 

Corpuscular Hemoglobin Concent 33.5, Red Cell Distribution Width 12.1, Platelet 

Count 401, Mean Platelet Volume 4.6L, Neutrophils (%) (Auto) 61.7, Lymphocytes (

%) (Auto) 21.5, Monocytes (%) (Auto) 10.9H, Eosinophils (%) (Auto) 3.9H, 

Basophils (%) (Auto) 1.9, Prothrombin Time 11.1, Prothromb Time International 

Ratio 1.1, Activated Partial Thromboplast Time 32, Sodium Level 142, Potassium 

Level 4.2, Chloride Level 102, Carbon Dioxide Level 26, Anion Gap 14, Blood 

Urea Nitrogen 38H, Creatinine 5.8H, Estimat Glomerular Filtration Rate 12.8, 

Glucose Level 92, Uric Acid 8.3H, Calcium Level 8.1L, Phosphorus Level 6.0H, 

Magnesium Level 1.9, Total Bilirubin 0.3, Aspartate Amino Transf (AST/SGOT) 14, 

Alanine Aminotransferase (ALT/SGPT) 6, Alkaline Phosphatase 48, C-Reactive 

Protein, Quantitative 4.2H, Pro-B-Type Natriuretic Peptide 5189H, Total Protein 

5.7L, Albumin 2.3L, Globulin 3.4, Albumin/Globulin Ratio 0.6L


Height (Feet):  6


Height (Inches):  1.00


Weight (Pounds):  200


General Appearance:  no apparent distress


Objective


no change in PE











ALISON ROTHMAN Jan 16, 2017 08:57

## 2017-01-16 NOTE — INFECTIOUS DISEASES PROG NOTE
Assessment/Plan


Assessment/Plan





ASSESSMENT:   46-year-old male with:





Left lower extremity cellulitis - improved


   MRI : no evidence of associated abscess, fasciitis or osteomyelitis


   Doppler : No DVT 


Fever - resolved 


Leucocytosis - resolved 








ARF SP rupinder 1/9, initiated HD


   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 Medical renal disease suspected.


Polysubstance abuse - UDS(+) Amphetamines, opiates, marijuana, tobacco


Sulfa allergy


Full Code








PLAN:





ok to DC on PO doxycycline x3d from ID standpoint - Rx on chart.  


 ( 1/14 SP Ceftaroline  d# 9 )


 ( 1/5 SP on  cefepime and  vancomycin d# 4 ) 


Monitor CBC, temperatures


Monitor BMP.


HD prn





Subjective


Allergies:  


Coded Allergies:  


     SULFUR (Unverified  Allergy, Unknown, unk, 1/7/17)


 Per patient allergic to sulfur


Subjective





remains afebrile. improved





Objective


Vital Signs





Last 24 Hour Vital Signs








  Date Time  Temp Pulse Resp B/P Pulse Ox O2 Delivery O2 Flow Rate FiO2


 


1/16/17 16:01  90 20   Nasal Cannula 2.0 28


 


1/16/17 15:20  88 20 154/83 97 Nasal Cannula 2.0 


 


1/16/17 15:16  88 20 161/85 97 Nasal Cannula 2.0 


 


1/16/17 15:10  85 20 158/83 98 Nasal Cannula 2.0 


 


1/16/17 15:05  86 20 158/85 98 Nasal Cannula 2.0 


 


1/16/17 15:03  88 20    2.0 


 


1/16/17 15:01  86 20 158/85 94 Nasal Cannula 2.0 


 


1/16/17 14:55  89 20 148/85 94 Nasal Cannula 2.0 


 


1/16/17 14:48  89 20 150/90 90 Room Air  


 


1/16/17 08:47  88  150/88    


 


1/16/17 07:58 98.1 88 20 150/88 98 Nasal Cannula 2.0 


 


1/16/17 06:40      Nasal Cannula 28.0 28


 


1/16/17 06:40     93 Nasal Cannula 2.0 28


 


1/16/17 04:00 98.6 90 20 138/88 91 Room Air  


 


1/16/17 00:00 98.2 95 20 147/84 90 Room Air  


 


1/15/17 20:00 98.1 95 18 149/85 92 Room Air  


 


1/15/17 19:30  90 20   Room Air  21


 


1/15/17 18:04 99.1       


 


1/15/17 17:28  98  132/77    


 


1/15/17 16:42 99.1 98 16 132/77 95 Room Air  








Height (Feet):  6


Height (Inches):  1.00


Weight (Pounds):  200


General Appearance:  no acute distress


Respiratory/Chest:  no respiratory distress


Cardiovascular:  normal rate, regular rhythm


Abdomen:  normal bowel sounds, soft, non tender, non distended


Extremities:  other - edema





Laboratory Tests








Test


  1/16/17


05:15 1/16/17


08:55


 


White Blood Count


  8.7 K/UL


(4.8-10.8) 


 


 


Red Blood Count


  2.89 M/UL


(4.70-6.10)  L 


 


 


Hemoglobin


  8.8 G/DL


(14.2-18.0)  L 


 


 


Hematocrit


  26.2 %


(42.0-52.0)  L 


 


 


Mean Corpuscular Volume 91 FL (80-99)   


 


Mean Corpuscular Hemoglobin


  30.3 PG


(27.0-31.0) 


 


 


Mean Corpuscular Hemoglobin


Concent 33.5 G/DL


(32.0-36.0) 


 


 


Red Cell Distribution Width


  12.1 %


(11.6-14.8) 


 


 


Platelet Count


  401 K/UL


(150-450) 


 


 


Mean Platelet Volume


  4.6 FL


(6.5-10.1)  L 


 


 


Neutrophils (%) (Auto)


  61.7 %


(45.0-75.0) 


 


 


Lymphocytes (%) (Auto)


  21.5 %


(20.0-45.0) 


 


 


Monocytes (%) (Auto)


  10.9 %


(1.0-10.0)  H 


 


 


Eosinophils (%) (Auto)


  3.9 %


(0.0-3.0)  H 


 


 


Basophils (%) (Auto)


  1.9 %


(0.0-2.0) 


 


 


Prothrombin Time


  11.1 SEC


(9.30-11.50) 


 


 


Prothromb Time International


Ratio 1.1 (0.9-1.1)  


  


 


 


Activated Partial


Thromboplast Time 32 SEC (23-33)


  


 


 


Sodium Level


  142 mEQ/L


(135-145) 


 


 


Potassium Level


  4.2 mEQ/L


(3.4-4.9) 


 


 


Chloride Level


  102 mEQ/L


() 


 


 


Carbon Dioxide Level


  26 mEQ/L


(20-30) 


 


 


Anion Gap 14 (5-15)   


 


Blood Urea Nitrogen


  38 mg/dL


(7-23)  H 


 


 


Creatinine


  5.8 mg/dL


(0.7-1.2)  H 


 


 


Estimat Glomerular Filtration


Rate 12.8 mL/min


(>60) 


 


 


Glucose Level


  92 mg/dL


() 


 


 


Uric Acid


  8.3 mg/dL


(3.0-7.5)  H 


 


 


Calcium Level


  8.1 mg/dL


(8.6-10.2)  L 


 


 


Phosphorus Level


  6.0 mg/dL


(2.5-4.8)  H 


 


 


Magnesium Level


  1.9 mg/dL


(1.7-2.5) 


 


 


Total Bilirubin


  0.3 mg/dL


(0.0-1.2) 


 


 


Aspartate Amino Transf


(AST/SGOT) 14 U/L (5-40)  


  


 


 


Alanine Aminotransferase


(ALT/SGPT) 6 U/L (3-41)  


  


 


 


Alkaline Phosphatase


  48 U/L


() 


 


 


C-Reactive Protein,


Quantitative 4.2 mg/dL (<


0.5)  H 


 


 


Pro-B-Type Natriuretic Peptide


  5189 pg/mL


(0-125)  H 


 


 


Total Protein


  5.7 g/dL


(6.6-8.7)  L 


 


 


Albumin


  2.3 g/dL


(3.5-5.2)  L 


 


 


Globulin 3.4 g/dL   


 


Albumin/Globulin Ratio


  0.6 (1.0-2.7)


L 


 


 


Hepatitis A IgM Antibody  Pending  


 


Hepatitis B Surface Antigen  Pending  


 


Hepatitis B Core IgM Antibody  Pending  


 


Hepatitis C Antibody  Pending  











Current Medications








 Medications


  (Trade)  Dose


 Ordered  Sig/Kathleen


 Route


 PRN Reason  Start Time


 Stop Time Status Last Admin


Dose Admin


 


 Acetaminophen


  (Tylenol)  650 mg  Q4H  PRN


 ORAL


 fever  1/2/17 23:45


 2/1/17 23:44  1/7/17 02:46


 


 


 Albuterol/


 Ipratropium


  (DuoNeb


 0.5-3(2.5)mg/3ml)  3 ml  Q4H  PRN


 HHN


 Shortness of breath  1/14/17 16:00


 1/19/17 15:59   


 


 


 Amlodipine


 Besylate


  (Norvasc)  5 mg  BID


 ORAL


   1/13/17 18:00


 2/12/17 17:59  1/16/17 08:47


 


 


 Dextrose


  (Dextrose 50%)    STAT  PRN


 IV


 Hypoglycemia  1/2/17 23:45


 2/1/17 23:44   


 


 


 Docusate Sodium


  (Colace)  100 mg  THREE TIMES A  DAY


 ORAL


   1/13/17 13:00


 2/12/17 12:59  1/16/17 13:27


 


 


 Doxycycline


 Monohydrate


  (Vibramycin)  100 mg  EVERY 12  HOURS


 ORAL


   1/14/17 21:00


 1/21/17 20:59  1/16/17 10:35


 


 


 Heparin Sodium


  (Porcine)


  (Heparin 5000


 units/ml)  5,000 units  EVERY 12  HOURS


 SUBQ


   1/3/17 09:00


 2/2/17 08:59  1/16/17 08:53


 


 


 Morphine Sulfate


  (Morphine


 Sulfate)  4 mg  Q4H  PRN


 IVP


 For Pain  1/11/17 22:00


 1/18/17 21:59  1/16/17 00:33


 


 


 Ondansetron HCl


  (Zofran)  4 mg  Q6H  PRN


 IVP


 Nausea & Vomiting  1/2/17 23:45


 2/1/17 23:44   


 


 


 Polyethylene


 Glycol


  (Miralax)  17 gm  HSPRN  PRN


 ORAL


 Constipation  1/2/17 23:45


 2/1/17 23:44   


 


 


 Quetiapine


 Fumarate


  (SEROquel)  25 mg  Q12HR


 ORAL


   1/15/17 11:00


 2/14/17 10:59  1/16/17 08:46


 


 


 Sevelamer


 Carbonate


  (Renvela)  2,400 mg  THREE TIMES A  DAY


 ORAL


   1/16/17 09:30


 2/15/17 09:29  1/16/17 13:28


 


 


 Tamsulosin HCl


  (Flomax)  0.4 mg  BID


 ORAL


   1/7/17 11:30


 2/6/17 11:29  1/16/17 08:49


 


 


 Zolpidem Tartrate


  (Ambien)  5 mg  HSPRN  PRN


 ORAL


 Insomnia  1/2/17 23:45


 2/1/17 23:44   


 

















TANIA FRANCIS Jan 16, 2017 16:26

## 2017-01-16 NOTE — PULMONOLOGY PROGRESS NOTE
Assessment/Plan


Problems:  


(1) Sepsis


(2) Cellulitis


(3) ATN (acute tubular necrosis)


Assessment/Plan


afebrile


no new complains





legs looks much better


continue antibiotics





waiting to see if pt will need Hd





Subjective


ROS Limited/Unobtainable:  No


Constitutional:  Reports: anorexia, fatigue


Skin:  Reports: rash, ulcer


Allergies:  


Coded Allergies:  


     SULFUR (Unverified  Allergy, Unknown, unk, 1/7/17)


 Per patient allergic to sulfur





Objective





Last 24 Hour Vital Signs








  Date Time  Temp Pulse Resp B/P Pulse Ox O2 Delivery O2 Flow Rate FiO2


 


1/16/17 08:47  88  150/88    


 


1/16/17 07:58 98.1 88 20 150/88 98 Nasal Cannula 2.0 


 


1/16/17 04:00 98.6 90 20 138/88 91 Room Air  


 


1/16/17 00:00 98.2 95 20 147/84 90 Room Air  


 


1/15/17 20:00 98.1 95 18 149/85 92 Room Air  


 


1/15/17 19:30  90 20   Room Air  21


 


1/15/17 18:04 99.1       


 


1/15/17 17:28  98  132/77    


 


1/15/17 16:42 99.1 98 16 132/77 95 Room Air  

















Intake and Output  


 


 1/15/17 1/16/17





 19:00 07:00


 


Intake Total 1075 ml 400 ml


 


Output Total 300 ml 360 ml


 


Balance 775 ml 40 ml


 


  


 


Intake Oral 1075 ml 400 ml


 


Output Urine Total 300 ml 360 ml


 


# Voids 3 2








General Appearance:  no acute distress


HEENT:  normocephalic, atraumatic, PERRL


Respiratory/Chest:  chest wall non-tender, respiratory distress, decreased 

breath sounds, accessory muscle use


Cardiovascular:  normal peripheral pulses, normal rate, regular rhythm, no JVD


Abdomen:  normal bowel sounds, soft, non tender, no organomegaly, non distended


Genitourinary:  normal external genitalia


Extremities:  no cyanosis


Skin:  rash, lesions


Neurologic/Psychiatric:  CNs II-XII grossly normal, no motor/sensory deficits


Laboratory Tests


1/16/17 05:15: 


White Blood Count 8.7, Red Blood Count 2.89L, Hemoglobin 8.8L, Hematocrit 26.2L

, Mean Corpuscular Volume 91, Mean Corpuscular Hemoglobin 30.3, Mean 

Corpuscular Hemoglobin Concent 33.5, Red Cell Distribution Width 12.1, Platelet 

Count 401, Mean Platelet Volume 4.6L, Neutrophils (%) (Auto) 61.7, Lymphocytes (

%) (Auto) 21.5, Monocytes (%) (Auto) 10.9H, Eosinophils (%) (Auto) 3.9H, 

Basophils (%) (Auto) 1.9, Prothrombin Time 11.1, Prothromb Time International 

Ratio 1.1, Activated Partial Thromboplast Time 32, Sodium Level 142, Potassium 

Level 4.2, Chloride Level 102, Carbon Dioxide Level 26, Anion Gap 14, Blood 

Urea Nitrogen 38H, Creatinine 5.8H, Estimat Glomerular Filtration Rate 12.8, 

Glucose Level 92, Uric Acid 8.3H, Calcium Level 8.1L, Phosphorus Level 6.0H, 

Magnesium Level 1.9, Total Bilirubin 0.3, Aspartate Amino Transf (AST/SGOT) 14, 

Alanine Aminotransferase (ALT/SGPT) 6, Alkaline Phosphatase 48, C-Reactive 

Protein, Quantitative 4.2H, Pro-B-Type Natriuretic Peptide 5189H, Total Protein 

5.7L, Albumin 2.3L, Globulin 3.4, Albumin/Globulin Ratio 0.6L


1/16/17 08:55: 


Hepatitis A IgM Antibody [Pending], Hepatitis B Surface Antigen [Pending], 

Hepatitis B Core IgM Antibody [Pending], Hepatitis C Antibody [Pending]





Current Medications








 Medications


  (Trade)  Dose


 Ordered  Sig/Kathleen


 Route


 PRN Reason  Start Time


 Stop Time Status Last Admin


Dose Admin


 


 Acetaminophen


  (Tylenol)  650 mg  Q4H  PRN


 ORAL


 fever  1/2/17 23:45


 2/1/17 23:44  1/7/17 02:46


 


 


 Albuterol/


 Ipratropium


  (DuoNeb


 0.5-3(2.5)mg/3ml)  3 ml  Q4H  PRN


 HHN


 Shortness of breath  1/14/17 16:00


 1/19/17 15:59   


 


 


 Amlodipine


 Besylate


  (Norvasc)  5 mg  BID


 ORAL


   1/13/17 18:00


 2/12/17 17:59  1/16/17 08:47


 


 


 Dextrose


  (Dextrose 50%)    STAT  PRN


 IV


 Hypoglycemia  1/2/17 23:45


 2/1/17 23:44   


 


 


 Docusate Sodium


  (Colace)  100 mg  THREE TIMES A  DAY


 ORAL


   1/13/17 13:00


 2/12/17 12:59  1/16/17 13:27


 


 


 Doxycycline


 Monohydrate


  (Vibramycin)  100 mg  EVERY 12  HOURS


 ORAL


   1/14/17 21:00


 1/21/17 20:59  1/16/17 10:35


 


 


 Heparin Sodium


  (Porcine)


  (Heparin 5000


 units/ml)  5,000 units  EVERY 12  HOURS


 SUBQ


   1/3/17 09:00


 2/2/17 08:59  1/16/17 08:53


 


 


 Morphine Sulfate


  (Morphine


 Sulfate)  4 mg  Q4H  PRN


 IVP


 For Pain  1/11/17 22:00


 1/18/17 21:59  1/16/17 00:33


 


 


 Ondansetron HCl


  (Zofran)  4 mg  Q6H  PRN


 IVP


 Nausea & Vomiting  1/2/17 23:45


 2/1/17 23:44   


 


 


 Polyethylene


 Glycol


  (Miralax)  17 gm  HSPRN  PRN


 ORAL


 Constipation  1/2/17 23:45


 2/1/17 23:44   


 


 


 Quetiapine


 Fumarate


  (SEROquel)  25 mg  Q12HR


 ORAL


   1/15/17 11:00


 2/14/17 10:59  1/16/17 08:46


 


 


 Sevelamer


 Carbonate


  (Renvela)  2,400 mg  THREE TIMES A  DAY


 ORAL


   1/16/17 09:30


 2/15/17 09:29  1/16/17 13:28


 


 


 Tamsulosin HCl


  (Flomax)  0.4 mg  BID


 ORAL


   1/7/17 11:30


 2/6/17 11:29  1/16/17 08:49


 


 


 Zolpidem Tartrate


  (Ambien)  5 mg  HSPRN  PRN


 ORAL


 Insomnia  1/2/17 23:45


 2/1/17 23:44   


 

















GE WIN Jan 16, 2017 14:55

## 2017-01-17 VITALS — SYSTOLIC BLOOD PRESSURE: 153 MMHG | DIASTOLIC BLOOD PRESSURE: 97 MMHG

## 2017-01-17 VITALS — SYSTOLIC BLOOD PRESSURE: 128 MMHG | DIASTOLIC BLOOD PRESSURE: 82 MMHG

## 2017-01-17 VITALS — DIASTOLIC BLOOD PRESSURE: 79 MMHG | SYSTOLIC BLOOD PRESSURE: 115 MMHG

## 2017-01-17 VITALS — SYSTOLIC BLOOD PRESSURE: 151 MMHG | DIASTOLIC BLOOD PRESSURE: 90 MMHG

## 2017-01-17 VITALS — DIASTOLIC BLOOD PRESSURE: 86 MMHG | SYSTOLIC BLOOD PRESSURE: 131 MMHG

## 2017-01-17 RX ADMIN — DOCUSATE SODIUM SCH MG: 100 CAPSULE, LIQUID FILLED ORAL at 12:21

## 2017-01-17 RX ADMIN — DOCUSATE SODIUM SCH MG: 100 CAPSULE, LIQUID FILLED ORAL at 08:12

## 2017-01-17 RX ADMIN — TAMSULOSIN HYDROCHLORIDE SCH MG: 0.4 CAPSULE ORAL at 18:19

## 2017-01-17 RX ADMIN — TAMSULOSIN HYDROCHLORIDE SCH MG: 0.4 CAPSULE ORAL at 08:13

## 2017-01-17 RX ADMIN — MORPHINE SULFATE PRN MG: 4 INJECTION INTRAVENOUS at 08:37

## 2017-01-17 RX ADMIN — HEPARIN SODIUM SCH UNITS: 5000 INJECTION INTRAVENOUS; SUBCUTANEOUS at 20:39

## 2017-01-17 RX ADMIN — DOCUSATE SODIUM SCH MG: 100 CAPSULE, LIQUID FILLED ORAL at 18:19

## 2017-01-17 RX ADMIN — HEPARIN SODIUM SCH UNITS: 5000 INJECTION INTRAVENOUS; SUBCUTANEOUS at 08:18

## 2017-01-17 NOTE — DIAGNOSTIC IMAGING REPORT
Indications: Nonfunctioning temporary hemodialysis catheter



Technique:



Procedure, indications, risks, alternatives were explained to the patient's family,

who understands and gives written consent to proceed.



Strict aseptic technique was utilized, including hand washing, use of hat and mask,

use of sterile gown and gloves, prepping of external portion of indwelling 

temporary

hemodialysis catheter and surrounding right neck base skin with 2% 

chlorhexidine

solution, and application of large sterile barrier over this area. Skin and

subcutaneous soft tissues surrounding the catheter dermatotomy site infiltrated with

1% lidocaine and sodium bicarbonate. Each port of indwelling catheter aspirated,

then flushed with heparinized saline. 0.035 inch Aquacue guidewire was advanced

through the venous port of the catheter under direct fluoroscopic guidance into 

the

inferior vena cava. Catheter partially withdrawn over the guidewire into the 

right

brachiocephalic vein. Nonionic contrast injected through the catheter arterial port

and digital subtraction images of the right brachiocephalic vein and superior vena

cava obtained.



Catheter removed over guidewire. A new 12 Irish 20 cm temporary hemodialysis

catheter was advanced over the guidewire via the existing access under 

direct

fluoroscopic guidance into the superior vena cava. Guidewire removed. Both catheter

ports aspirated, then flushed with heparinized saline. 1000 units into each 

catheter

port. Catheter secured the skin with suture and adhesive dressing. Final spot film

image obtained.



Patient tolerated procedures well without immediate complications and was returned

to his room in stable condition.



Total fluoroscopy time one minute.

Dose area product 99 dGy-cm



Findings:



Comparison: None



Venous port of the catheter aspirates and flushes freely. Arterial port catheter

flushes but does not aspirate.



Venogram demonstrates patent right brachiocephalic vein and superior vena cava

without obvious intraluminal filling defect or stenosis.



Removed catheter intact.



Final image demonstrates tip of new temporary hemodialysis catheter at level of

SVC-right atrial junction. Both ports aspirate and flush freely.



IMPRESSION:



Right brachiocephalic and superior vena cava venogram within normal limits



Exchange of indwelling temporary hemodialysis catheter for a new longer catheter, in

good position, working well.

## 2017-01-17 NOTE — INFECTIOUS DISEASES PROG NOTE
Assessment/Plan


Assessment/Plan





ASSESSMENT:   46-year-old male with:





Left lower extremity cellulitis - improved


   MRI : no evidence of associated abscess, fasciitis or osteomyelitis


   Doppler : No DVT 


Fever - resolved 


Leucocytosis - resolved 








ARF --> HD SP rupinder 1/9, exchanged 1/16 


   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 Medical renal disease suspected.


Polysubstance abuse - UDS(+) Amphetamines, opiates, marijuana, tobacco


Sulfa allergy


Full Code








PLAN:





ok to DC on PO doxycycline x2d from ID standpoint - Rx on chart.  


 ( 1/14 SP Ceftaroline  d# 9 )


 ( 1/5 SP on  cefepime and  vancomycin d# 4 ) 


Monitor CBC, temperatures


Monitor BMP.


HD prn





Subjective


Allergies:  


Coded Allergies:  


     SULFUR (Unverified  Allergy, Unknown, unk, 1/7/17)


 Per patient allergic to sulfur


Subjective





remains afebrile.  no new complaint


HD cath exchanged





Objective


Vital Signs





Last 24 Hour Vital Signs








  Date Time  Temp Pulse Resp B/P Pulse Ox O2 Delivery O2 Flow Rate FiO2


 


1/17/17 09:23 98.1       


 


1/17/17 08:12  86  153/97    


 


1/17/17 08:10 98.1 86 21 153/97 97 Room Air  


 


1/17/17 07:55      Nasal Cannula 28.0 28


 


1/17/17 07:54  90 18   Nasal Cannula 2.0 28


 


1/17/17 07:54     92 Nasal Cannula 2.0 28


 


1/17/17 04:00 97.8 92 20 115/79 90 Room Air  


 


1/17/17 00:00 98.2 90 20 151/90 92 Nasal Cannula 2.0 


 


1/16/17 23:05      Room Air  


 


1/16/17 23:04 96.4 84 18 163/79 96 Room Air  


 


1/16/17 22:10  94  178/105    


 


1/16/17 20:00      Room Air  


 


1/16/17 19:45 96.8 94 21 178/105 96 Room Air  


 


1/16/17 19:20     93 Nasal Cannula 2.0 28


 


1/16/17 19:20      Nasal Cannula 28.0 28


 


1/16/17 19:00 98.1 99 20 136/104 90 Room Air  








Height (Feet):  6


Height (Inches):  1.00


Weight (Pounds):  200


General Appearance:  no acute distress


Respiratory/Chest:  no respiratory distress


Cardiovascular:  normal rate, regular rhythm


Abdomen:  normal bowel sounds, soft, non tender, non distended





Current Medications








 Medications


  (Trade)  Dose


 Ordered  Sig/Kathleen


 Route


 PRN Reason  Start Time


 Stop Time Status Last Admin


Dose Admin


 


 Acetaminophen


  (Tylenol)  650 mg  Q4H  PRN


 ORAL


 fever  1/2/17 23:45


 2/1/17 23:44  1/7/17 02:46


 


 


 Albuterol/


 Ipratropium


  (DuoNeb


 0.5-3(2.5)mg/3ml)  3 ml  Q4H  PRN


 HHN


 Shortness of breath  1/14/17 16:00


 1/19/17 15:59   


 


 


 Amlodipine


 Besylate


  (Norvasc)  5 mg  BID


 ORAL


   1/13/17 18:00


 2/12/17 17:59  1/17/17 08:12


 


 


 Dextrose


  (Dextrose 50%)    STAT  PRN


 IV


 Hypoglycemia  1/2/17 23:45


 2/1/17 23:44   


 


 


 Docusate Sodium


  (Colace)  100 mg  THREE TIMES A  DAY


 ORAL


   1/13/17 13:00


 2/12/17 12:59  1/17/17 12:21


 


 


 Doxycycline


 Monohydrate


  (Vibramycin)  100 mg  EVERY 12  HOURS


 ORAL


   1/14/17 21:00


 1/21/17 20:59  1/17/17 08:12


 


 


 Heparin Sodium


  (Porcine)


  (Heparin 5000


 units/ml)  5,000 units  EVERY 12  HOURS


 SUBQ


   1/3/17 09:00


 2/2/17 08:59  1/17/17 08:18


 


 


 Morphine Sulfate


  (Morphine


 Sulfate)  4 mg  Q4H  PRN


 IVP


 For Pain  1/11/17 22:00


 1/18/17 21:59  1/17/17 08:37


 


 


 Ondansetron HCl


  (Zofran)  4 mg  Q6H  PRN


 IVP


 Nausea & Vomiting  1/2/17 23:45


 2/1/17 23:44   


 


 


 Polyethylene


 Glycol


  (Miralax)  17 gm  HSPRN  PRN


 ORAL


 Constipation  1/2/17 23:45


 2/1/17 23:44   


 


 


 Quetiapine


 Fumarate


  (SEROquel)  25 mg  Q12HR


 ORAL


   1/15/17 11:00


 2/14/17 10:59  1/16/17 08:46


 


 


 Sevelamer


 Carbonate


  (Renvela)  2,400 mg  THREE TIMES A  DAY


 ORAL


   1/16/17 09:30


 2/15/17 09:29  1/17/17 12:21


 


 


 Tamsulosin HCl


  (Flomax)  0.4 mg  BID


 ORAL


   1/7/17 11:30


 2/6/17 11:29  1/17/17 08:13


 


 


 Zolpidem Tartrate


  (Ambien)  5 mg  HSPRN  PRN


 ORAL


 Insomnia  1/2/17 23:45


 2/1/17 23:44   


 

















TANIA FRANCIS Jan 17, 2017 16:05

## 2017-01-17 NOTE — GENERAL PROGRESS NOTE
Assessment/Plan


Status:  unchanged - from renal stand


Assessment/Plan


Status:





Cellulitis left leg- Sepsis, received Amikacin and Vanco in the past.


Acute renal failure- 


HypoAlbuminemia , Proteinuria , ? NS


Multi drug abuse








Plan:





eventually had the catheter changed and dialysed yesterday-


On Norvasc-


On Renagel-


On  flomax-





Urine studies- Urine Tox screen- MJ and Amphetamines


Monitor renal parameters- 


DC planning...


Per orders





Subjective


ROS Limited/Unobtainable:  No


Constitutional:  Reports: malaise


Allergies:  


Coded Allergies:  


     SULFUR (Unverified  Allergy, Unknown, unk, 1/7/17)


 Per patient allergic to sulfur





Objective





Last 24 Hour Vital Signs








  Date Time  Temp Pulse Resp B/P Pulse Ox O2 Delivery O2 Flow Rate FiO2


 


1/17/17 09:23 98.1       


 


1/17/17 08:12  86  153/97    


 


1/17/17 08:10 98.1 86 21 153/97 97 Room Air  


 


1/17/17 07:55      Nasal Cannula 28.0 28


 


1/17/17 07:54  90 18   Nasal Cannula 2.0 28


 


1/17/17 07:54     92 Nasal Cannula 2.0 28


 


1/17/17 04:00 97.8 92 20 115/79 90 Room Air  


 


1/17/17 00:00 98.2 90 20 151/90 92 Nasal Cannula 2.0 


 


1/16/17 23:05      Room Air  


 


1/16/17 23:04 96.4 84 18 163/79 96 Room Air  


 


1/16/17 22:10  94  178/105    


 


1/16/17 20:00      Room Air  


 


1/16/17 19:45 96.8 94 21 178/105 96 Room Air  


 


1/16/17 19:20     93 Nasal Cannula 2.0 28


 


1/16/17 19:20      Nasal Cannula 28.0 28


 


1/16/17 19:00 98.1 99 20 136/104 90 Room Air  


 


1/16/17 16:01  90 20   Nasal Cannula 2.0 28


 


1/16/17 15:20  88 20 154/83 97 Nasal Cannula 2.0 


 


1/16/17 15:16  88 20 161/85 97 Nasal Cannula 2.0 


 


1/16/17 15:10  85 20 158/83 98 Nasal Cannula 2.0 


 


1/16/17 15:05  86 20 158/85 98 Nasal Cannula 2.0 


 


1/16/17 15:03  88 20    2.0 


 


1/16/17 15:01  86 20 158/85 94 Nasal Cannula 2.0 


 


1/16/17 14:55  89 20 148/85 94 Nasal Cannula 2.0 


 


1/16/17 14:48  89 20 150/90 90 Room Air  

















Intake and Output  


 


 1/16/17 1/17/17





 19:00 07:00


 


Intake Total  360 ml


 


Output Total  1100 ml


 


Balance  -740 ml


 


  


 


Intake Oral  360 ml


 


Hemodialysis UF  1100 ml


 


# Voids  5








Height (Feet):  6


Height (Inches):  1.00


Weight (Pounds):  200


General Appearance:  no apparent distress


Cardiovascular:  regular rhythm


Respiratory/Chest:  decreased breath sounds


Abdomen:  soft


Objective


no change in PE











ALISON ROTHMAN Jan 17, 2017 11:17

## 2017-01-17 NOTE — DIAGNOSTIC IMAGING REPORT
Indications: Nonfunctioning temporary hemodialysis catheter



Technique:



Procedure, indications, risks, alternatives were explained to the patient's family,

who understands and gives written consent to proceed.



Strict aseptic technique was utilized, including hand washing, use of hat and mask,

use of sterile gown and gloves, prepping of external portion of indwelling 

temporary

hemodialysis catheter and surrounding right neck base skin with 2% 

chlorhexidine

solution, and application of large sterile barrier over this area. Skin and

subcutaneous soft tissues surrounding the catheter dermatotomy site infiltrated with

1% lidocaine and sodium bicarbonate. Each port of indwelling catheter aspirated,

then flushed with heparinized saline. 0.035 inch IntelliDOT guidewire was advanced

through the venous port of the catheter under direct fluoroscopic guidance into 

the

inferior vena cava. Catheter partially withdrawn over the guidewire into the 

right

brachiocephalic vein. Nonionic contrast injected through the catheter arterial port

and digital subtraction images of the right brachiocephalic vein and superior vena

cava obtained.



Catheter removed over guidewire. A new 12 Malay 20 cm temporary hemodialysis

catheter was advanced over the guidewire via the existing access under 

direct

fluoroscopic guidance into the superior vena cava. Guidewire removed. Both catheter

ports aspirated, then flushed with heparinized saline. 1000 units into each 

catheter

port. Catheter secured the skin with suture and adhesive dressing. Final spot film

image obtained.



Patient tolerated procedures well without immediate complications and was returned

to his room in stable condition.



Total fluoroscopy time one minute.

Dose area product 99 dGy-cm



Findings:



Comparison: None



Venous port of the catheter aspirates and flushes freely. Arterial port catheter

flushes but does not aspirate.



Venogram demonstrates patent right brachiocephalic vein and superior vena cava

without obvious intraluminal filling defect or stenosis.



Removed catheter intact.



Final image demonstrates tip of new temporary hemodialysis catheter at level of

SVC-right atrial junction. Both ports aspirate and flush freely.



IMPRESSION:



Right brachiocephalic and superior vena cava venogram within normal limits



Exchange of indwelling temporary hemodialysis catheter for a new longer catheter, in

good position, working well.

## 2017-01-17 NOTE — PULMONOLOGY PROGRESS NOTE
Assessment/Plan


Problems:  


(1) Sepsis


(2) Cellulitis


(3) ATN (acute tubular necrosis)


Assessment/Plan


afebrile


no new complains





legs looks much better


continue antibiotics


dialyzed yesterday


don't know yet if pt will need longterm HD.





Subjective


ROS Limited/Unobtainable:  No


Interval Events:  dialyzed yesterday


Constitutional:  Reports: no symptoms


HEENT:  Repors: no symptoms


Allergies:  


Coded Allergies:  


     SULFUR (Unverified  Allergy, Unknown, unk, 1/7/17)


 Per patient allergic to sulfur





Objective





Last 24 Hour Vital Signs








  Date Time  Temp Pulse Resp B/P Pulse Ox O2 Delivery O2 Flow Rate FiO2


 


1/17/17 09:23 98.1       


 


1/17/17 08:12  86  153/97    


 


1/17/17 08:10 98.1 86 21 153/97 97 Room Air  


 


1/17/17 07:55      Nasal Cannula 28.0 28


 


1/17/17 07:54  90 18   Nasal Cannula 2.0 28


 


1/17/17 07:54     92 Nasal Cannula 2.0 28


 


1/17/17 04:00 97.8 92 20 115/79 90 Room Air  


 


1/17/17 00:00 98.2 90 20 151/90 92 Nasal Cannula 2.0 


 


1/16/17 23:05      Room Air  


 


1/16/17 23:04 96.4 84 18 163/79 96 Room Air  


 


1/16/17 22:10  94  178/105    


 


1/16/17 20:00      Room Air  


 


1/16/17 19:45 96.8 94 21 178/105 96 Room Air  


 


1/16/17 19:20     93 Nasal Cannula 2.0 28


 


1/16/17 19:20      Nasal Cannula 28.0 28


 


1/16/17 19:00 98.1 99 20 136/104 90 Room Air  


 


1/16/17 16:01  90 20   Nasal Cannula 2.0 28


 


1/16/17 15:20  88 20 154/83 97 Nasal Cannula 2.0 


 


1/16/17 15:16  88 20 161/85 97 Nasal Cannula 2.0 

















Intake and Output  


 


 1/16/17 1/17/17





 19:00 07:00


 


Intake Total  360 ml


 


Output Total  1100 ml


 


Balance  -740 ml


 


  


 


Intake Oral  360 ml


 


Hemodialysis UF  1100 ml


 


# Voids  5








General Appearance:  WD/WN


HEENT:  normocephalic, atraumatic


Respiratory/Chest:  chest wall non-tender, lungs clear


Cardiovascular:  normal peripheral pulses, normal rate


Abdomen:  normal bowel sounds, soft, non tender


Genitourinary:  normal external genitalia


Extremities:  no cyanosis


Skin:  no lesions


Neurologic/Psychiatric:  CNs II-XII grossly normal


Lymphatic:  no neck adenopathy





Current Medications








 Medications


  (Trade)  Dose


 Ordered  Sig/Kathleen


 Route


 PRN Reason  Start Time


 Stop Time Status Last Admin


Dose Admin


 


 Acetaminophen


  (Tylenol)  650 mg  Q4H  PRN


 ORAL


 fever  1/2/17 23:45


 2/1/17 23:44  1/7/17 02:46


 


 


 Albuterol/


 Ipratropium


  (DuoNeb


 0.5-3(2.5)mg/3ml)  3 ml  Q4H  PRN


 HHN


 Shortness of breath  1/14/17 16:00


 1/19/17 15:59   


 


 


 Amlodipine


 Besylate


  (Norvasc)  5 mg  BID


 ORAL


   1/13/17 18:00


 2/12/17 17:59  1/17/17 08:12


 


 


 Dextrose


  (Dextrose 50%)    STAT  PRN


 IV


 Hypoglycemia  1/2/17 23:45


 2/1/17 23:44   


 


 


 Docusate Sodium


  (Colace)  100 mg  THREE TIMES A  DAY


 ORAL


   1/13/17 13:00


 2/12/17 12:59  1/17/17 12:21


 


 


 Doxycycline


 Monohydrate


  (Vibramycin)  100 mg  EVERY 12  HOURS


 ORAL


   1/14/17 21:00


 1/21/17 20:59  1/17/17 08:12


 


 


 Heparin Sodium


  (Porcine)


  (Heparin 5000


 units/ml)  5,000 units  EVERY 12  HOURS


 SUBQ


   1/3/17 09:00


 2/2/17 08:59  1/17/17 08:18


 


 


 Morphine Sulfate


  (Morphine


 Sulfate)  4 mg  Q4H  PRN


 IVP


 For Pain  1/11/17 22:00


 1/18/17 21:59  1/17/17 08:37


 


 


 Ondansetron HCl


  (Zofran)  4 mg  Q6H  PRN


 IVP


 Nausea & Vomiting  1/2/17 23:45


 2/1/17 23:44   


 


 


 Polyethylene


 Glycol


  (Miralax)  17 gm  HSPRN  PRN


 ORAL


 Constipation  1/2/17 23:45


 2/1/17 23:44   


 


 


 Quetiapine


 Fumarate


  (SEROquel)  25 mg  Q12HR


 ORAL


   1/15/17 11:00


 2/14/17 10:59  1/16/17 08:46


 


 


 Sevelamer


 Carbonate


  (Renvela)  2,400 mg  THREE TIMES A  DAY


 ORAL


   1/16/17 09:30


 2/15/17 09:29  1/17/17 12:21


 


 


 Tamsulosin HCl


  (Flomax)  0.4 mg  BID


 ORAL


   1/7/17 11:30


 2/6/17 11:29  1/17/17 08:13


 


 


 Zolpidem Tartrate


  (Ambien)  5 mg  HSPRN  PRN


 ORAL


 Insomnia  1/2/17 23:45


 2/1/17 23:44   


 

















GE IWN Jan 17, 2017 15:11

## 2017-01-17 NOTE — GENERAL PROGRESS NOTE
Progress Note


Progress Note


Afebrile, still on intermittent HD, BUN/CREAT yesterday at highest since 

admission. (before dialysis)


Lower left leg wound all healed.


Will sign off.  Nothing surgical at this time











CARLI EWING Jan 17, 2017 09:52

## 2017-01-18 VITALS — DIASTOLIC BLOOD PRESSURE: 96 MMHG | SYSTOLIC BLOOD PRESSURE: 161 MMHG

## 2017-01-18 VITALS — DIASTOLIC BLOOD PRESSURE: 90 MMHG | SYSTOLIC BLOOD PRESSURE: 156 MMHG

## 2017-01-18 VITALS — DIASTOLIC BLOOD PRESSURE: 90 MMHG | SYSTOLIC BLOOD PRESSURE: 154 MMHG

## 2017-01-18 VITALS — SYSTOLIC BLOOD PRESSURE: 163 MMHG | DIASTOLIC BLOOD PRESSURE: 103 MMHG

## 2017-01-18 VITALS — SYSTOLIC BLOOD PRESSURE: 167 MMHG | DIASTOLIC BLOOD PRESSURE: 94 MMHG

## 2017-01-18 VITALS — DIASTOLIC BLOOD PRESSURE: 100 MMHG | SYSTOLIC BLOOD PRESSURE: 157 MMHG

## 2017-01-18 VITALS — SYSTOLIC BLOOD PRESSURE: 153 MMHG | DIASTOLIC BLOOD PRESSURE: 75 MMHG

## 2017-01-18 VITALS — DIASTOLIC BLOOD PRESSURE: 90 MMHG | SYSTOLIC BLOOD PRESSURE: 150 MMHG

## 2017-01-18 VITALS — SYSTOLIC BLOOD PRESSURE: 170 MMHG | DIASTOLIC BLOOD PRESSURE: 96 MMHG

## 2017-01-18 VITALS — DIASTOLIC BLOOD PRESSURE: 96 MMHG | SYSTOLIC BLOOD PRESSURE: 163 MMHG

## 2017-01-18 LAB
ALBUMIN/GLOB SERPL: 0.6 {RATIO} (ref 1–2.7)
ALT SERPL-CCNC: 7 U/L (ref 3–41)
ANION GAP SERPL CALC-SCNC: 12 MMOL/L (ref 5–15)
AST SERPL-CCNC: 19 U/L (ref 5–40)
BASOPHILS NFR BLD AUTO: 2.7 % (ref 0–2)
CALCIUM SERPL-MCNC: 8.3 MG/DL (ref 8.6–10.2)
CHLORIDE SERPL-SCNC: 101 MEQ/L (ref 98–107)
CO2 SERPL-SCNC: 28 MEQ/L (ref 20–30)
CREAT SERPL-MCNC: 5.4 MG/DL (ref 0.7–1.2)
EOSINOPHIL NFR BLD AUTO: 3 % (ref 0–3)
ERYTHROCYTE [DISTWIDTH] IN BLOOD BY AUTOMATED COUNT: 11.6 % (ref 11.6–14.8)
GFR SERPLBLD BASED ON 1.73 SQ M-ARVRAT: 13.9 ML/MIN (ref 60–?)
GLOBULIN SER-MCNC: 3.6 G/DL
HEMOLYSIS: 13
LYMPHOCYTES NFR BLD AUTO: 23 % (ref 20–45)
MCH RBC QN AUTO: 29.2 PG (ref 27–31)
MCHC RBC AUTO-ENTMCNC: 33.3 G/DL (ref 32–36)
MCV RBC AUTO: 88 FL (ref 80–99)
MONOCYTES NFR BLD AUTO: 10.7 % (ref 1–10)
NEUTROPHILS NFR BLD AUTO: 60.5 % (ref 45–75)
PHOSPHATE SERPL-MCNC: 5.2 MG/DL (ref 2.5–4.8)
PLATELET # BLD: 306 K/UL (ref 150–450)
PMV BLD AUTO: 5.7 FL (ref 6.5–10.1)
POTASSIUM SERPL-SCNC: 4 MEQ/L (ref 3.4–4.9)
PROT SERPL-MCNC: 6.1 G/DL (ref 6.6–8.7)
RBC # BLD AUTO: 3.12 M/UL (ref 4.7–6.1)
SODIUM SERPL-SCNC: 141 MEQ/L (ref 135–145)
URATE SERPL-MCNC: 5.6 MG/DL (ref 3–7.5)
WBC # BLD AUTO: 6.8 K/UL (ref 4.8–10.8)

## 2017-01-18 PROCEDURE — B513ZZA FLUOROSCOPY OF RIGHT JUGULAR VEINS, GUIDANCE: ICD-10-PCS

## 2017-01-18 PROCEDURE — 05HM33Z INSERTION OF INFUSION DEVICE INTO RIGHT INTERNAL JUGULAR VEIN, PERCUTANEOUS APPROACH: ICD-10-PCS

## 2017-01-18 RX ADMIN — DOCUSATE SODIUM SCH MG: 100 CAPSULE, LIQUID FILLED ORAL at 13:00

## 2017-01-18 RX ADMIN — TAMSULOSIN HYDROCHLORIDE SCH MG: 0.4 CAPSULE ORAL at 09:00

## 2017-01-18 RX ADMIN — HEPARIN SODIUM SCH UNITS: 5000 INJECTION INTRAVENOUS; SUBCUTANEOUS at 20:39

## 2017-01-18 RX ADMIN — DOCUSATE SODIUM SCH MG: 100 CAPSULE, LIQUID FILLED ORAL at 09:00

## 2017-01-18 RX ADMIN — HYDROCODONE BITARTRATE AND ACETAMINOPHEN PRN EA: 10; 325 TABLET ORAL at 19:49

## 2017-01-18 RX ADMIN — DOCUSATE SODIUM SCH MG: 100 CAPSULE, LIQUID FILLED ORAL at 17:55

## 2017-01-18 RX ADMIN — TAMSULOSIN HYDROCHLORIDE SCH MG: 0.4 CAPSULE ORAL at 17:55

## 2017-01-18 RX ADMIN — HEPARIN SODIUM SCH UNITS: 5000 INJECTION INTRAVENOUS; SUBCUTANEOUS at 09:00

## 2017-01-18 NOTE — DIAGNOSTIC IMAGING REPORT
Indication: Patient requires long-term hemodialysis.



Findings: After the indications, procedure, risks, complications, and alternatives

of the procedure were explained, written informed consent was obtained.



Patient was brought to the angio-fluoroscopic suite and placed supine on the table.

All elements of maximum sterile barrier technique were followed including use of a

cap and mask, sterile gown, sterile gloves, and a large sterile sheet.  Alcohol used

to prep the skin. 1% lidocaine was used to anesthetize the skin and subcutaneous

tissue. Sonographic evaluation of the the right   jugular vein was 

performed

demonstrating a patent and compressible vein.  Access using an 18 gauge needle 

was

obtained under real-time ultrasound guidance and digital image was saved in archive.

An 035 wire was then advanced into the vein and negotiated fluoroscopically into

the inferior vena cava.



Lidocaine infiltration of the right anterior chest wall was then performed followed

by dermatotomy. A tunnel of lidocaine was then made between this site and the 

venous

puncture site. A 14.5 Hungarian 23 cm dual-lumen catheter was then tunneled through 

the

tract.  The wire within the jugular vein was then exchanged for a dilator. 

Serial

dilatations were performed. The catheter was then inserted into the 

last

dilator/peel-away sheath such that the tip resides in the SVC. The 

dilator/peel-away

sheath and wire were removed and the catheter was completely buried under the 

skin.

Proximal portion of the catheter was secured to the skin using 2-0 Prolene suture.

Both ports aspirate and flush easily.  Both dermatotomy sites were closed 

with

Dermabond.



Impression: Successful placement of tunneled  right jugular hemodialysis catheter.

No complications.

## 2017-01-18 NOTE — GENERAL PROGRESS NOTE
Assessment/Plan


Status:  unchanged


Status Narrative








ready for DC after change od catheter to permacath and placement arrangement 

for OP HD





.


Assessment/Plan





Status:





Cellulitis left leg- Sepsis, received Amikacin and Vanco in the past.


Acute renal failure- 


HypoAlbuminemia , Proteinuria , ? NS


Multi drug abuse








Plan:





eventually had the catheter changed and dialysed 


On Norvasc-


On Renagel-


On  flomax-





Urine studies- Urine Tox screen- MJ and Amphetamines


Monitor renal parameters- 


DC planning...


Per orders





Subjective


ROS Limited/Unobtainable:  No


Allergies:  


Coded Allergies:  


     SULFUR (Unverified  Allergy, Unknown, unk, 1/7/17)


 Per patient allergic to sulfur





Objective





Last 24 Hour Vital Signs








  Date Time  Temp Pulse Resp B/P Pulse Ox O2 Delivery O2 Flow Rate FiO2


 


1/18/17 12:05  104 15 170/96 98 Nasal Cannula 2.0 


 


1/18/17 12:00  106 26 167/94 98 Nasal Cannula 2.0 


 


1/18/17 11:55  101 17 156/90 98 Nasal Cannula 2.0 


 


1/18/17 11:53  101 17 156/90 98 Nasal Cannula 2.0 


 


1/18/17 11:50  93 14 154/90 100 Nasal Cannula 2.0 


 


1/18/17 11:45  82 14 161/96 99 Nasal Cannula 2.0 


 


1/18/17 11:11  82 12    2.0 


 


1/18/17 07:56      Room Air  


 


1/18/17 07:54     91 Room Air  21


 


1/18/17 07:53  89 18   Room Air  21


 


1/18/17 04:00 97.9 87 18 153/75 91 Room Air  


 


1/18/17 00:28 98.1 90 20 150/90 91 Nasal Cannula 2.0 


 


1/17/17 19:42      Nasal Cannula 28.0 28


 


1/17/17 19:41     96 Nasal Cannula 2.0 28


 


1/17/17 19:41  92 18   Nasal Cannula 2.0 28


 


1/17/17 19:00 98.1 97 20 128/82 100 Room Air  


 


1/17/17 18:19  96  131/86    


 


1/17/17 16:00 98.2 96 20 131/86 96 Room Air  

















Intake and Output  


 


 1/17/17 1/18/17





 19:00 07:00


 


Intake Total 480 ml 620 ml


 


Output Total  800 ml


 


Balance 480 ml -180 ml


 


  


 


Intake Oral 480 ml 620 ml


 


Output Urine Total  800 ml


 


# Voids  2











Current Medications








 Medications


  (Trade)  Dose


 Ordered  Sig/Kathleen


 Route


 PRN Reason  Start Time


 Stop Time Status Last Admin


Dose Admin


 


 Acetaminophen


  (Tylenol)  650 mg  Q4H  PRN


 ORAL


 fever  1/2/17 23:45


 2/1/17 23:44  1/7/17 02:46


 


 


 Acetaminophen/


 Hydrocodone Bitart


  (Norco 10/325)  1 ea  Q4H  PRN


 ORAL


 For Pain  1/18/17 11:30


 1/25/17 11:29   


 


 


 Albuterol/


 Ipratropium


  (DuoNeb


 0.5-3(2.5)mg/3ml)  3 ml  Q4H  PRN


 HHN


 Shortness of breath  1/14/17 16:00


 1/19/17 15:59   


 


 


 Amlodipine


 Besylate


  (Norvasc)  5 mg  BID


 ORAL


   1/13/17 18:00


 2/12/17 17:59  1/17/17 18:19


 


 


 Dextrose


  (Dextrose 50%)    STAT  PRN


 IV


 Hypoglycemia  1/2/17 23:45


 2/1/17 23:44   


 


 


 Docusate Sodium


  (Colace)  100 mg  THREE TIMES A  DAY


 ORAL


   1/13/17 13:00


 2/12/17 12:59  1/17/17 18:19


 


 


 Doxycycline


 Monohydrate


  (Vibramycin)  100 mg  EVERY 12  HOURS


 ORAL


   1/14/17 21:00


 1/21/17 20:59  1/17/17 20:37


 


 


 Heparin Sodium


  (Porcine)


  (Heparin 5000


 units/ml)  5,000 units  EVERY 12  HOURS


 SUBQ


   1/3/17 09:00


 2/2/17 08:59  1/17/17 20:39


 


 


 Ondansetron HCl


  (Zofran)  4 mg  Q6H  PRN


 IVP


 Nausea & Vomiting  1/2/17 23:45


 2/1/17 23:44   


 


 


 Polyethylene


 Glycol


  (Miralax)  17 gm  HSPRN  PRN


 ORAL


 Constipation  1/2/17 23:45


 2/1/17 23:44   


 


 


 Quetiapine


 Fumarate


  (SEROquel)  25 mg  Q12HR


 ORAL


   1/15/17 11:00


 2/14/17 10:59  1/16/17 08:46


 


 


 Sevelamer


 Carbonate


  (Renvela)  2,400 mg  THREE TIMES A  DAY


 ORAL


   1/16/17 09:30


 2/15/17 09:29  1/17/17 18:19


 


 


 Tamsulosin HCl


  (Flomax)  0.4 mg  BID


 ORAL


   1/7/17 11:30


 2/6/17 11:29  1/17/17 18:19


 


 


 Zolpidem Tartrate


  (Ambien)  5 mg  HSPRN  PRN


 ORAL


 Insomnia  1/2/17 23:45


 2/1/17 23:44   


 





Laboratory Tests


1/18/17 05:00: 


White Blood Count 6.8, Red Blood Count 3.12L, Hemoglobin 9.1L, Hematocrit 27.4L

, Mean Corpuscular Volume 88, Mean Corpuscular Hemoglobin 29.2, Mean 

Corpuscular Hemoglobin Concent 33.3, Red Cell Distribution Width 11.6, Platelet 

Count 306, Mean Platelet Volume 5.7L, Neutrophils (%) (Auto) 60.5, Lymphocytes (

%) (Auto) 23.0, Monocytes (%) (Auto) 10.7H, Eosinophils (%) (Auto) 3.0, 

Basophils (%) (Auto) 2.7H, Sodium Level 141, Potassium Level 4.0, Chloride 

Level 101, Carbon Dioxide Level 28, Anion Gap 12, Blood Urea Nitrogen 31H, 

Creatinine 5.4H, Estimat Glomerular Filtration Rate 13.9, Glucose Level 97, 

Uric Acid 5.6, Calcium Level 8.3L, Phosphorus Level 5.2H, Total Bilirubin 0.3, 

Aspartate Amino Transf (AST/SGOT) 19, Alanine Aminotransferase (ALT/SGPT) 7, 

Alkaline Phosphatase 49, Total Protein 6.1L, Albumin 2.5L, Globulin 3.6, Albumin

/Globulin Ratio 0.6L


Height (Feet):  6


Height (Inches):  1.00


Weight (Pounds):  200


General Appearance:  no apparent distress


Objective


no change in PE











ALISON ROTHMAN Jan 18, 2017 13:05

## 2017-01-18 NOTE — INFECTIOUS DISEASES PROG NOTE
Assessment/Plan


Assessment/Plan





ASSESSMENT:   46-year-old male with:





Left lower extremity cellulitis - improved


   MRI : no evidence of associated abscess, fasciitis or osteomyelitis


   Doppler : No DVT 


Fever - resolved 


Leucocytosis - resolved 








ARF --> HD SP rupinder 1/9, exchanged 1/16 


   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 Medical renal disease suspected.


Polysubstance abuse - UDS(+) Amphetamines, opiates, marijuana, tobacco


Sulfa allergy


Full Code








PLAN:





ok to DC on PO doxycycline x1d from ID standpoint - Rx on chart.  


 ( 1/14 SP Ceftaroline  d# 9 )


 ( 1/5 SP on  cefepime and  vancomycin d# 4 ) 


Monitor CBC, temperatures


Monitor BMP.


HD prn





Subjective


Allergies:  


Coded Allergies:  


     SULFUR (Unverified  Allergy, Unknown, unk, 1/7/17)


 Per patient allergic to sulfur


Subjective





remains afebrile.  no new complaint





Objective


Vital Signs





Last 24 Hour Vital Signs








  Date Time  Temp Pulse Resp B/P Pulse Ox O2 Delivery O2 Flow Rate FiO2


 


1/18/17 16:00 98.8 92 20 157/100 94 Nasal Cannula 2.0 


 


1/18/17 12:05  104 15 170/96 98 Nasal Cannula 2.0 


 


1/18/17 12:00  106 26 167/94 98 Nasal Cannula 2.0 


 


1/18/17 11:55  101 17 156/90 98 Nasal Cannula 2.0 


 


1/18/17 11:53  101 17 156/90 98 Nasal Cannula 2.0 


 


1/18/17 11:50  93 14 154/90 100 Nasal Cannula 2.0 


 


1/18/17 11:45  82 14 161/96 99 Nasal Cannula 2.0 


 


1/18/17 11:11  82 12    2.0 


 


1/18/17 07:56      Room Air  


 


1/18/17 07:54     91 Room Air  21


 


1/18/17 07:53  89 18   Room Air  21


 


1/18/17 04:00 97.9 87 18 153/75 91 Room Air  


 


1/18/17 00:28 98.1 90 20 150/90 91 Nasal Cannula 2.0 


 


1/17/17 19:42      Nasal Cannula 28.0 28


 


1/17/17 19:41     96 Nasal Cannula 2.0 28


 


1/17/17 19:41  92 18   Nasal Cannula 2.0 28 1/17/17 19:00 98.1 97 20 128/82 100 Room Air  


 


1/17/17 18:19  96  131/86    








Height (Feet):  6


Height (Inches):  1.00


Weight (Pounds):  200


General Appearance:  no acute distress


Respiratory/Chest:  no respiratory distress


Cardiovascular:  normal rate, regular rhythm


Abdomen:  normal bowel sounds, soft, non tender, non distended





Laboratory Tests








Test


  1/18/17


05:00


 


White Blood Count


  6.8 K/UL


(4.8-10.8)


 


Red Blood Count


  3.12 M/UL


(4.70-6.10)  L


 


Hemoglobin


  9.1 G/DL


(14.2-18.0)  L


 


Hematocrit


  27.4 %


(42.0-52.0)  L


 


Mean Corpuscular Volume 88 FL (80-99)  


 


Mean Corpuscular Hemoglobin


  29.2 PG


(27.0-31.0)


 


Mean Corpuscular Hemoglobin


Concent 33.3 G/DL


(32.0-36.0)


 


Red Cell Distribution Width


  11.6 %


(11.6-14.8)


 


Platelet Count


  306 K/UL


(150-450)


 


Mean Platelet Volume


  5.7 FL


(6.5-10.1)  L


 


Neutrophils (%) (Auto)


  60.5 %


(45.0-75.0)


 


Lymphocytes (%) (Auto)


  23.0 %


(20.0-45.0)


 


Monocytes (%) (Auto)


  10.7 %


(1.0-10.0)  H


 


Eosinophils (%) (Auto)


  3.0 %


(0.0-3.0)


 


Basophils (%) (Auto)


  2.7 %


(0.0-2.0)  H


 


Sodium Level


  141 mEQ/L


(135-145)


 


Potassium Level


  4.0 mEQ/L


(3.4-4.9)


 


Chloride Level


  101 mEQ/L


()


 


Carbon Dioxide Level


  28 mEQ/L


(20-30)


 


Anion Gap 12 (5-15)  


 


Blood Urea Nitrogen


  31 mg/dL


(7-23)  H


 


Creatinine


  5.4 mg/dL


(0.7-1.2)  H


 


Estimat Glomerular Filtration


Rate 13.9 mL/min


(>60)


 


Glucose Level


  97 mg/dL


()


 


Uric Acid


  5.6 mg/dL


(3.0-7.5)


 


Calcium Level


  8.3 mg/dL


(8.6-10.2)  L


 


Phosphorus Level


  5.2 mg/dL


(2.5-4.8)  H


 


Total Bilirubin


  0.3 mg/dL


(0.0-1.2)


 


Aspartate Amino Transf


(AST/SGOT) 19 U/L (5-40)  


 


 


Alanine Aminotransferase


(ALT/SGPT) 7 U/L (3-41)  


 


 


Alkaline Phosphatase


  49 U/L


()


 


Total Protein


  6.1 g/dL


(6.6-8.7)  L


 


Albumin


  2.5 g/dL


(3.5-5.2)  L


 


Globulin 3.6 g/dL  


 


Albumin/Globulin Ratio


  0.6 (1.0-2.7)


L











Current Medications








 Medications


  (Trade)  Dose


 Ordered  Sig/Kathleen


 Route


 PRN Reason  Start Time


 Stop Time Status Last Admin


Dose Admin


 


 Acetaminophen


  (Tylenol)  650 mg  Q4H  PRN


 ORAL


 fever  1/2/17 23:45


 2/1/17 23:44  1/7/17 02:46


 


 


 Acetaminophen/


 Hydrocodone Bitart


  (Norco 10/325)  1 ea  Q4H  PRN


 ORAL


 For MODERATE Pain  1/18/17 15:03


 1/25/17 11:29   


 


 


 Albuterol/


 Ipratropium


  (DuoNeb


 0.5-3(2.5)mg/3ml)  3 ml  Q4H  PRN


 HHN


 Shortness of breath  1/14/17 16:00


 1/19/17 15:59   


 


 


 Amlodipine


 Besylate


  (Norvasc)  5 mg  BID


 ORAL


   1/13/17 18:00


 2/12/17 17:59  1/17/17 18:19


 


 


 Dextrose


  (Dextrose 50%)    STAT  PRN


 IV


 Hypoglycemia  1/2/17 23:45


 2/1/17 23:44   


 


 


 Docusate Sodium


  (Colace)  100 mg  THREE TIMES A  DAY


 ORAL


   1/13/17 13:00


 2/12/17 12:59  1/17/17 18:19


 


 


 Doxycycline


 Monohydrate


  (Vibramycin)  100 mg  EVERY 12  HOURS


 ORAL


   1/14/17 21:00


 1/21/17 20:59  1/17/17 20:37


 


 


 Heparin Sodium


  (Porcine)


  (Heparin 5000


 units/ml)  5,000 units  EVERY 12  HOURS


 SUBQ


   1/3/17 09:00


 2/2/17 08:59  1/17/17 20:39


 


 


 Morphine Sulfate


  (Morphine


 Sulfate)  2 mg  Q4H  PRN


 IVP


 PAIN UNRELIEVED BY NORCO  1/18/17 17:15


 1/25/17 17:14   


 


 


 Ondansetron HCl


  (Zofran)  4 mg  Q6H  PRN


 IVP


 Nausea & Vomiting  1/2/17 23:45


 2/1/17 23:44   


 


 


 Polyethylene


 Glycol


  (Miralax)  17 gm  HSPRN  PRN


 ORAL


 Constipation  1/2/17 23:45


 2/1/17 23:44   


 


 


 Quetiapine


 Fumarate


  (SEROquel)  25 mg  Q12HR


 ORAL


   1/15/17 11:00


 2/14/17 10:59  1/16/17 08:46


 


 


 Sevelamer


 Carbonate


  (Renvela)  2,400 mg  THREE TIMES A  DAY


 ORAL


   1/16/17 09:30


 2/15/17 09:29  1/17/17 18:19


 


 


 Tamsulosin HCl


  (Flomax)  0.4 mg  BID


 ORAL


   1/7/17 11:30


 2/6/17 11:29  1/17/17 18:19


 


 


 Zolpidem Tartrate


  (Ambien)  5 mg  HSPRN  PRN


 ORAL


 Insomnia  1/2/17 23:45


 2/1/17 23:44   


 

















TANIA FRANCIS Jan 18, 2017 17:25

## 2017-01-18 NOTE — PULMONOLOGY PROGRESS NOTE
Assessment/Plan


Problems:  


(1) Sepsis


(2) Cellulitis


(3) ATN (acute tubular necrosis)


Assessment/Plan


perma cath was place


no new complains


legs looks much better


continue antibiotics


dialyzed yesterday


pt will need longterm HD at least for now





Subjective


ROS Limited/Unobtainable:  No


Interval Events:  had perma cath today


Constitutional:  Reports: no symptoms


HEENT:  Repors: no symptoms


Respiratory:  Reports: no symptoms


Allergies:  


Coded Allergies:  


     SULFUR (Unverified  Allergy, Unknown, unk, 1/7/17)


 Per patient allergic to sulfur





Objective





Last 24 Hour Vital Signs








  Date Time  Temp Pulse Resp B/P Pulse Ox O2 Delivery O2 Flow Rate FiO2


 


1/18/17 16:00 98.8 92 20 157/100 94 Nasal Cannula 2.0 


 


1/18/17 12:05  104 15 170/96 98 Nasal Cannula 2.0 


 


1/18/17 12:00  106 26 167/94 98 Nasal Cannula 2.0 


 


1/18/17 11:55  101 17 156/90 98 Nasal Cannula 2.0 


 


1/18/17 11:53  101 17 156/90 98 Nasal Cannula 2.0 


 


1/18/17 11:50  93 14 154/90 100 Nasal Cannula 2.0 


 


1/18/17 11:45  82 14 161/96 99 Nasal Cannula 2.0 


 


1/18/17 11:11  82 12    2.0 


 


1/18/17 07:56      Room Air  


 


1/18/17 07:54     91 Room Air  21


 


1/18/17 07:53  89 18   Room Air  21


 


1/18/17 04:00 97.9 87 18 153/75 91 Room Air  


 


1/18/17 00:28 98.1 90 20 150/90 91 Nasal Cannula 2.0 


 


1/17/17 19:42      Nasal Cannula 28.0 28


 


1/17/17 19:41     96 Nasal Cannula 2.0 28


 


1/17/17 19:41  92 18   Nasal Cannula 2.0 28


 


1/17/17 19:00 98.1 97 20 128/82 100 Room Air  


 


1/17/17 18:19  96  131/86    

















Intake and Output  


 


 1/17/17 1/18/17





 19:00 07:00


 


Intake Total 480 ml 620 ml


 


Output Total  800 ml


 


Balance 480 ml -180 ml


 


  


 


Intake Oral 480 ml 620 ml


 


Output Urine Total  800 ml


 


# Voids  2








General Appearance:  WD/WN


HEENT:  atraumatic


Respiratory/Chest:  chest wall non-tender, lungs clear


Cardiovascular:  normal peripheral pulses, normal rate


Abdomen:  normal bowel sounds, no organomegaly


Extremities:  no cyanosis, no clubbing


Neurologic/Psychiatric:  CNs II-XII grossly normal, no motor/sensory deficits


Laboratory Tests


1/18/17 05:00: 


White Blood Count 6.8, Red Blood Count 3.12L, Hemoglobin 9.1L, Hematocrit 27.4L

, Mean Corpuscular Volume 88, Mean Corpuscular Hemoglobin 29.2, Mean 

Corpuscular Hemoglobin Concent 33.3, Red Cell Distribution Width 11.6, Platelet 

Count 306, Mean Platelet Volume 5.7L, Neutrophils (%) (Auto) 60.5, Lymphocytes (

%) (Auto) 23.0, Monocytes (%) (Auto) 10.7H, Eosinophils (%) (Auto) 3.0, 

Basophils (%) (Auto) 2.7H, Sodium Level 141, Potassium Level 4.0, Chloride 

Level 101, Carbon Dioxide Level 28, Anion Gap 12, Blood Urea Nitrogen 31H, 

Creatinine 5.4H, Estimat Glomerular Filtration Rate 13.9, Glucose Level 97, 

Uric Acid 5.6, Calcium Level 8.3L, Phosphorus Level 5.2H, Total Bilirubin 0.3, 

Aspartate Amino Transf (AST/SGOT) 19, Alanine Aminotransferase (ALT/SGPT) 7, 

Alkaline Phosphatase 49, Total Protein 6.1L, Albumin 2.5L, Globulin 3.6, Albumin

/Globulin Ratio 0.6L





Current Medications








 Medications


  (Trade)  Dose


 Ordered  Sig/Kathleen


 Route


 PRN Reason  Start Time


 Stop Time Status Last Admin


Dose Admin


 


 Acetaminophen


  (Tylenol)  650 mg  Q4H  PRN


 ORAL


 fever  1/2/17 23:45


 2/1/17 23:44  1/7/17 02:46


 


 


 Acetaminophen/


 Hydrocodone Bitart


  (Norco 10/325)  1 ea  Q4H  PRN


 ORAL


 For MODERATE Pain  1/18/17 15:03


 1/25/17 11:29   


 


 


 Albuterol/


 Ipratropium


  (DuoNeb


 0.5-3(2.5)mg/3ml)  3 ml  Q4H  PRN


 HHN


 Shortness of breath  1/14/17 16:00


 1/19/17 15:59   


 


 


 Amlodipine


 Besylate


  (Norvasc)  5 mg  BID


 ORAL


   1/13/17 18:00


 2/12/17 17:59  1/17/17 18:19


 


 


 Dextrose


  (Dextrose 50%)    STAT  PRN


 IV


 Hypoglycemia  1/2/17 23:45


 2/1/17 23:44   


 


 


 Docusate Sodium


  (Colace)  100 mg  THREE TIMES A  DAY


 ORAL


   1/13/17 13:00


 2/12/17 12:59  1/17/17 18:19


 


 


 Doxycycline


 Monohydrate


  (Vibramycin)  100 mg  EVERY 12  HOURS


 ORAL


   1/14/17 21:00


 1/21/17 20:59  1/17/17 20:37


 


 


 Heparin Sodium


  (Porcine)


  (Heparin 5000


 units/ml)  5,000 units  EVERY 12  HOURS


 SUBQ


   1/3/17 09:00


 2/2/17 08:59  1/17/17 20:39


 


 


 Morphine Sulfate


  (Morphine


 Sulfate)  4 mg  Q4H  PRN


 IVP


 Severe Pain (Pain Scale 7-10)  1/18/17 14:00


 1/25/17 13:59  1/18/17 16:12


 


 


 Ondansetron HCl


  (Zofran)  4 mg  Q6H  PRN


 IVP


 Nausea & Vomiting  1/2/17 23:45


 2/1/17 23:44   


 


 


 Polyethylene


 Glycol


  (Miralax)  17 gm  HSPRN  PRN


 ORAL


 Constipation  1/2/17 23:45


 2/1/17 23:44   


 


 


 Quetiapine


 Fumarate


  (SEROquel)  25 mg  Q12HR


 ORAL


   1/15/17 11:00


 2/14/17 10:59  1/16/17 08:46


 


 


 Sevelamer


 Carbonate


  (Renvela)  2,400 mg  THREE TIMES A  DAY


 ORAL


   1/16/17 09:30


 2/15/17 09:29  1/17/17 18:19


 


 


 Tamsulosin HCl


  (Flomax)  0.4 mg  BID


 ORAL


   1/7/17 11:30


 2/6/17 11:29  1/17/17 18:19


 


 


 Zolpidem Tartrate


  (Ambien)  5 mg  HSPRN  PRN


 ORAL


 Insomnia  1/2/17 23:45


 2/1/17 23:44   


 

















GE WIN Jan 18, 2017 17:03

## 2017-01-19 VITALS — DIASTOLIC BLOOD PRESSURE: 87 MMHG | SYSTOLIC BLOOD PRESSURE: 139 MMHG

## 2017-01-19 VITALS — DIASTOLIC BLOOD PRESSURE: 102 MMHG | SYSTOLIC BLOOD PRESSURE: 170 MMHG

## 2017-01-19 VITALS — SYSTOLIC BLOOD PRESSURE: 179 MMHG | DIASTOLIC BLOOD PRESSURE: 104 MMHG

## 2017-01-19 VITALS — SYSTOLIC BLOOD PRESSURE: 154 MMHG | DIASTOLIC BLOOD PRESSURE: 110 MMHG

## 2017-01-19 VITALS — DIASTOLIC BLOOD PRESSURE: 106 MMHG | SYSTOLIC BLOOD PRESSURE: 157 MMHG

## 2017-01-19 VITALS — DIASTOLIC BLOOD PRESSURE: 120 MMHG | SYSTOLIC BLOOD PRESSURE: 166 MMHG

## 2017-01-19 VITALS — DIASTOLIC BLOOD PRESSURE: 98 MMHG | SYSTOLIC BLOOD PRESSURE: 144 MMHG

## 2017-01-19 VITALS — SYSTOLIC BLOOD PRESSURE: 167 MMHG | DIASTOLIC BLOOD PRESSURE: 77 MMHG

## 2017-01-19 LAB
ALBUMIN/GLOB SERPL: 0.6 {RATIO} (ref 1–2.7)
ALT SERPL-CCNC: 8 U/L (ref 3–41)
ANION GAP SERPL CALC-SCNC: 14 MMOL/L (ref 5–15)
AST SERPL-CCNC: 17 U/L (ref 5–40)
BASOPHILS NFR BLD AUTO: 1.8 % (ref 0–2)
CALCIUM SERPL-MCNC: 8.2 MG/DL (ref 8.6–10.2)
CHLORIDE SERPL-SCNC: 102 MEQ/L (ref 98–107)
CO2 SERPL-SCNC: 25 MEQ/L (ref 20–30)
CREAT SERPL-MCNC: 5.2 MG/DL (ref 0.7–1.2)
EOSINOPHIL NFR BLD AUTO: 3.8 % (ref 0–3)
ERYTHROCYTE [DISTWIDTH] IN BLOOD BY AUTOMATED COUNT: 11.5 % (ref 11.6–14.8)
GFR SERPLBLD BASED ON 1.73 SQ M-ARVRAT: 14.5 ML/MIN (ref 60–?)
GLOBULIN SER-MCNC: 3.7 G/DL
HEMOLYSIS: 0
LYMPHOCYTES NFR BLD AUTO: 20.8 % (ref 20–45)
MCH RBC QN AUTO: 28.6 PG (ref 27–31)
MCHC RBC AUTO-ENTMCNC: 32.2 G/DL (ref 32–36)
MCV RBC AUTO: 89 FL (ref 80–99)
MONOCYTES NFR BLD AUTO: 10.7 % (ref 1–10)
NEUTROPHILS NFR BLD AUTO: 62.9 % (ref 45–75)
PHOSPHATE SERPL-MCNC: 5.3 MG/DL (ref 2.5–4.8)
PLATELET # BLD: 337 K/UL (ref 150–450)
PMV BLD AUTO: 5.6 FL (ref 6.5–10.1)
POTASSIUM SERPL-SCNC: 4 MEQ/L (ref 3.4–4.9)
PROT SERPL-MCNC: 6 G/DL (ref 6.6–8.7)
RBC # BLD AUTO: 3.38 M/UL (ref 4.7–6.1)
SODIUM SERPL-SCNC: 141 MEQ/L (ref 135–145)
WBC # BLD AUTO: 6.1 K/UL (ref 4.8–10.8)

## 2017-01-19 RX ADMIN — HEPARIN SODIUM SCH UNITS: 5000 INJECTION INTRAVENOUS; SUBCUTANEOUS at 21:00

## 2017-01-19 RX ADMIN — HYDROCODONE BITARTRATE AND ACETAMINOPHEN PRN EA: 10; 325 TABLET ORAL at 18:27

## 2017-01-19 RX ADMIN — DOCUSATE SODIUM SCH MG: 100 CAPSULE, LIQUID FILLED ORAL at 12:18

## 2017-01-19 RX ADMIN — TAMSULOSIN HYDROCHLORIDE SCH MG: 0.4 CAPSULE ORAL at 09:00

## 2017-01-19 RX ADMIN — HYDROCODONE BITARTRATE AND ACETAMINOPHEN PRN EA: 10; 325 TABLET ORAL at 09:27

## 2017-01-19 RX ADMIN — HYDROCODONE BITARTRATE AND ACETAMINOPHEN PRN EA: 10; 325 TABLET ORAL at 00:15

## 2017-01-19 RX ADMIN — HEPARIN SODIUM SCH UNITS: 5000 INJECTION INTRAVENOUS; SUBCUTANEOUS at 09:00

## 2017-01-19 RX ADMIN — HYDROCODONE BITARTRATE AND ACETAMINOPHEN PRN EA: 10; 325 TABLET ORAL at 04:48

## 2017-01-19 RX ADMIN — TAMSULOSIN HYDROCHLORIDE SCH MG: 0.4 CAPSULE ORAL at 18:28

## 2017-01-19 RX ADMIN — DOCUSATE SODIUM SCH MG: 100 CAPSULE, LIQUID FILLED ORAL at 18:28

## 2017-01-19 RX ADMIN — HYDROCODONE BITARTRATE AND ACETAMINOPHEN PRN EA: 10; 325 TABLET ORAL at 22:46

## 2017-01-19 RX ADMIN — DOCUSATE SODIUM SCH MG: 100 CAPSULE, LIQUID FILLED ORAL at 09:26

## 2017-01-19 NOTE — PULMONOLOGY PROGRESS NOTE
Assessment/Plan


Problems:  


(1) Sepsis


(2) Cellulitis


(3) ATN (acute tubular necrosis)


Assessment/Plan


perma cath was placed


no new complains


legs looks much better


continue antibiotics





awaiting Medical number to arrange for outpatient HD





Subjective


ROS Limited/Unobtainable:  No


Allergies:  


Coded Allergies:  


     SULFUR (Unverified  Allergy, Unknown, unk, 1/7/17)


 Per patient allergic to sulfur





Objective





Last 24 Hour Vital Signs








  Date Time  Temp Pulse Resp B/P Pulse Ox O2 Delivery O2 Flow Rate FiO2


 


1/19/17 16:00 98.6 86 20 144/98 94 Room Air  


 


1/19/17 13:15      Room Air 2.0 28


 


1/19/17 12:17  80  167/101    


 


1/19/17 12:10      Room Air  


 


1/19/17 11:11      Simple Mask 2.0 


 


1/19/17 11:10 97.0 78 18 170/102  Nasal Cannula 2.0 


 


1/19/17 10:10  79  170/102    


 


1/19/17 08:15 98.1 82 21 154/110 97 Room Air  


 


1/19/17 08:10 96.8 74 20 179/104 97 Nasal Cannula 2.0 


 


1/19/17 08:10      Nasal Cannula 2.0 


 


1/19/17 06:54      Room Air  


 


1/19/17 06:53     93 Room Air  


 


1/19/17 06:52  86 18   Room Air  


 


1/19/17 03:53 98.6 97 20 157/106 97 Room Air  


 


1/19/17 00:00 98.2 72 20 149/87 98 Room Air  


 


1/18/17 19:00      Nasal Cannula 28.0 28


 


1/18/17 19:00  92 16   Room Air  21


 


1/18/17 19:00     94 Nasal Cannula 2.0 28


 


1/18/17 19:00 98.2 89 20 163/103 91 Nasal Cannula 2.0 


 


1/18/17 17:55  92  157/100    

















Intake and Output  


 


 1/18/17 1/19/17





 19:00 07:00


 


Intake Total  1200 ml


 


Output Total 600 ml 1200 ml


 


Balance -600 ml 0 ml


 


  


 


Intake Oral  1200 ml


 


Output Urine Total 600 ml 1200 ml


 


# Voids 1 3








General Appearance:  WD/WN


HEENT:  normocephalic


Respiratory/Chest:  chest wall non-tender, lungs clear


Cardiovascular:  normal peripheral pulses, normal rate


Abdomen:  normal bowel sounds, soft, non tender, no organomegaly


Extremities:  no cyanosis


Skin:  no rash


Laboratory Tests


1/19/17 04:30: 


Sodium Level 141, Potassium Level 4.0, Chloride Level 102, Carbon Dioxide Level 

25, Anion Gap 14, Blood Urea Nitrogen 34H, Creatinine 5.2H, Estimat Glomerular 

Filtration Rate 14.5, Glucose Level 89, Calcium Level 8.2L, Phosphorus Level 

5.3H, Total Bilirubin 0.3, Aspartate Amino Transf (AST/SGOT) 17, Alanine 

Aminotransferase (ALT/SGPT) 8, Alkaline Phosphatase 48, Total Protein 6.0L, 

Albumin 2.3L, Globulin 3.7, Albumin/Globulin Ratio 0.6L


1/19/17 11:00: 


White Blood Count 6.1, Red Blood Count 3.38L, Hemoglobin 9.7L, Hematocrit 30.1L

, Mean Corpuscular Volume 89, Mean Corpuscular Hemoglobin 28.6, Mean 

Corpuscular Hemoglobin Concent 32.2, Red Cell Distribution Width 11.5L, 

Platelet Count 337, Mean Platelet Volume 5.6L, Neutrophils (%) (Auto) 62.9, 

Lymphocytes (%) (Auto) 20.8, Monocytes (%) (Auto) 10.7H, Eosinophils (%) (Auto) 

3.8H, Basophils (%) (Auto) 1.8





Current Medications








 Medications


  (Trade)  Dose


 Ordered  Sig/Kathleen


 Route


 PRN Reason  Start Time


 Stop Time Status Last Admin


Dose Admin


 


 Acetaminophen


  (Tylenol)  650 mg  Q4H  PRN


 ORAL


 fever  1/2/17 23:45


 2/1/17 23:44  1/7/17 02:46


 


 


 Acetaminophen/


 Hydrocodone Bitart


  (Norco 10/325)  1 ea  Q4H  PRN


 ORAL


 For MODERATE Pain  1/18/17 15:03


 1/25/17 11:29  1/19/17 09:27


 


 


 Clonidine HCl


  (Catapres)  0.1 mg  Q4H  PRN


 ORAL


 bp over 170 syst  1/19/17 11:15


 2/18/17 11:14   


 


 


 Dextrose


  (Dextrose 50%)    STAT  PRN


 IV


 Hypoglycemia  1/2/17 23:45


 2/1/17 23:44   


 


 


 Docusate Sodium


  (Colace)  100 mg  THREE TIMES A  DAY


 ORAL


   1/13/17 13:00


 2/12/17 12:59  1/19/17 09:26


 


 


 Doxycycline


 Monohydrate


  (Vibramycin)  100 mg  EVERY 12  HOURS


 ORAL


   1/14/17 21:00


 1/19/17 23:59  1/17/17 20:37


 


 


 Heparin Sodium


  (Porcine)


  (Heparin 5000


 units/ml)  5,000 units  EVERY 12  HOURS


 SUBQ


   1/3/17 09:00


 2/2/17 08:59  1/17/17 20:39


 


 


 Morphine Sulfate


  (Morphine


 Sulfate)  2 mg  Q4H  PRN


 IVP


 PAIN UNRELIEVED BY NORCO  1/18/17 17:15


 1/25/17 17:14   


 


 


 Nifedipine


  (Procardia XL)  60 mg  BID


 ORAL


   1/19/17 12:00


 2/18/17 11:59  1/19/17 12:17


 


 


 Ondansetron HCl


  (Zofran)  4 mg  Q6H  PRN


 IVP


 Nausea & Vomiting  1/2/17 23:45


 2/1/17 23:44   


 


 


 Polyethylene


 Glycol


  (Miralax)  17 gm  HSPRN  PRN


 ORAL


 Constipation  1/2/17 23:45


 2/1/17 23:44   


 


 


 Quetiapine


 Fumarate


  (SEROquel)  25 mg  Q12HR


 ORAL


   1/15/17 11:00


 2/14/17 10:59  1/16/17 08:46


 


 


 Sevelamer


 Carbonate


  (Renvela)  2,400 mg  THREE TIMES A  DAY


 ORAL


   1/16/17 09:30


 2/15/17 09:29  1/18/17 17:55


 


 


 Tamsulosin HCl


  (Flomax)  0.4 mg  BID


 ORAL


   1/7/17 11:30


 2/6/17 11:29  1/18/17 17:55


 


 


 Zolpidem Tartrate


  (Ambien)  5 mg  HSPRN  PRN


 ORAL


 Insomnia  1/2/17 23:45


 2/1/17 23:44   


 

















GE WIN Jan 19, 2017 17:01

## 2017-01-19 NOTE — GENERAL PROGRESS NOTE
Assessment/Plan


Status:  stable


Assessment/Plan





Status:





Cellulitis left leg- Sepsis, received Amikacin and Vanco in the past.


Acute renal failure- 


HypoAlbuminemia , Proteinuria , ? NS


Multi drug abuse








Plan:





eventually had the catheter changed and dialysed -


On Dialysis today-


change Norvasc to Procardiaxl BID


clonidin PRN





On Renagel-


On  flomax-





Urine studies- Urine Tox screen- MJ and Amphetamines


Monitor renal parameters- 


DC planning...


Per orders





Subjective


ROS Limited/Unobtainable:  No


Constitutional:  Reports: malaise


Allergies:  


Coded Allergies:  


     SULFUR (Unverified  Allergy, Unknown, unk, 1/7/17)


 Per patient allergic to sulfur





Objective





Last 24 Hour Vital Signs








  Date Time  Temp Pulse Resp B/P Pulse Ox O2 Delivery O2 Flow Rate FiO2


 


1/19/17 10:10  79  170/102    


 


1/19/17 08:15 98.1 82 21 154/110 97 Room Air  


 


1/19/17 08:10 96.8 74 20 179/104 97 Nasal Cannula 2.0 


 


1/19/17 08:10      Nasal Cannula 2.0 


 


1/19/17 06:54      Room Air  


 


1/19/17 06:53     93 Room Air  


 


1/19/17 06:52  86 18   Room Air  


 


1/19/17 03:53 98.6 97 20 157/106 97 Room Air  


 


1/19/17 00:00 98.2 72 20 149/87 98 Room Air  


 


1/18/17 19:00      Nasal Cannula 28.0 28


 


1/18/17 19:00  92 16   Room Air  21


 


1/18/17 19:00     94 Nasal Cannula 2.0 28


 


1/18/17 19:00 98.2 89 20 163/103 91 Nasal Cannula 2.0 


 


1/18/17 17:55  92  157/100    


 


1/18/17 16:00 98.8 92 20 157/100 94 Nasal Cannula 2.0 


 


1/18/17 12:05  104 15 170/96 98 Nasal Cannula 2.0 


 


1/18/17 12:00  106 26 167/94 98 Nasal Cannula 2.0 


 


1/18/17 11:55  101 17 156/90 98 Nasal Cannula 2.0 


 


1/18/17 11:53  101 17 156/90 98 Nasal Cannula 2.0 


 


1/18/17 11:50  93 14 154/90 100 Nasal Cannula 2.0 


 


1/18/17 11:45  82 14 161/96 99 Nasal Cannula 2.0 


 


1/18/17 11:11  82 12    2.0 

















Intake and Output  


 


 1/18/17 1/19/17





 19:00 07:00


 


Intake Total  1200 ml


 


Output Total 600 ml 1200 ml


 


Balance -600 ml 0 ml


 


  


 


Intake Oral  1200 ml


 


Output Urine Total 600 ml 1200 ml


 


# Voids 1 3








Laboratory Tests


1/19/17 04:30: 


Sodium Level 141, Potassium Level 4.0, Chloride Level 102, Carbon Dioxide Level 

25, Anion Gap 14, Blood Urea Nitrogen 34H, Creatinine 5.2H, Estimat Glomerular 

Filtration Rate 14.5, Glucose Level 89, Calcium Level 8.2L, Phosphorus Level 

5.3H, Total Bilirubin 0.3, Aspartate Amino Transf (AST/SGOT) 17, Alanine 

Aminotransferase (ALT/SGPT) 8, Alkaline Phosphatase 48, Total Protein 6.0L, 

Albumin 2.3L, Globulin 3.7, Albumin/Globulin Ratio 0.6L


Height (Feet):  6


Height (Inches):  1.00


Weight (Pounds):  200


General Appearance:  no apparent distress


Objective


no change in PE











ALISON ROTHMAN Jan 19, 2017 11:05

## 2017-01-19 NOTE — INFECTIOUS DISEASES PROG NOTE
Assessment/Plan


Assessment/Plan





ASSESSMENT:   46-year-old male with:





Left lower extremity cellulitis - improved SP Rx


   MRI : no evidence of associated abscess, fasciitis or osteomyelitis


   Doppler : No DVT 


Fever - resolved 


Leucocytosis - resolved 








ARF --> HD SP rupinder 1/9, exchanged 1/16 


   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 Medical renal disease suspected.


Polysubstance abuse - UDS(+) Amphetamines, opiates, marijuana, tobacco


Sulfa allergy


Full Code








PLAN:





ok to DC off of ABX from ID standpoint


  ( 1/19 SP PO doxycycline d# 7 )


  ( 1/14 SP Ceftaroline  d# 9 )


  ( 1/5 SP on  cefepime and  vancomycin d# 4 ) 


Monitor CBC, temperatures


Monitor BMP.


HD prn





Subjective


Allergies:  


Coded Allergies:  


     SULFUR (Unverified  Allergy, Unknown, unk, 1/7/17)


 Per patient allergic to sulfur


Subjective





remains afebrile.  no new complaint





Objective


Vital Signs





Last 24 Hour Vital Signs








  Date Time  Temp Pulse Resp B/P Pulse Ox O2 Delivery O2 Flow Rate FiO2


 


1/19/17 16:00 98.6 86 20 144/98 94 Room Air  


 


1/19/17 13:15      Room Air 2.0 28


 


1/19/17 12:17  80  167/101    


 


1/19/17 12:10      Room Air  


 


1/19/17 11:11      Simple Mask 2.0 


 


1/19/17 11:10 97.0 78 18 170/102  Nasal Cannula 2.0 


 


1/19/17 10:10  79  170/102    


 


1/19/17 08:15 98.1 82 21 154/110 97 Room Air  


 


1/19/17 08:10 96.8 74 20 179/104 97 Nasal Cannula 2.0 


 


1/19/17 08:10      Nasal Cannula 2.0 


 


1/19/17 06:54      Room Air  


 


1/19/17 06:53     93 Room Air  


 


1/19/17 06:52  86 18   Room Air  


 


1/19/17 03:53 98.6 97 20 157/106 97 Room Air  


 


1/19/17 00:00 98.2 72 20 149/87 98 Room Air  


 


1/18/17 19:00      Nasal Cannula 28.0 28


 


1/18/17 19:00  92 16   Room Air  21


 


1/18/17 19:00     94 Nasal Cannula 2.0 28


 


1/18/17 19:00 98.2 89 20 163/103 91 Nasal Cannula 2.0 


 


1/18/17 17:55  92  157/100    








Height (Feet):  6


Height (Inches):  1.00


Weight (Pounds):  200


General Appearance:  no acute distress


Respiratory/Chest:  no respiratory distress


Cardiovascular:  normal rate, regular rhythm


Abdomen:  normal bowel sounds, soft, non tender, non distended





Laboratory Tests








Test


  1/19/17


04:30 1/19/17


11:00


 


Sodium Level


  141 mEQ/L


(135-145) 


 


 


Potassium Level


  4.0 mEQ/L


(3.4-4.9) 


 


 


Chloride Level


  102 mEQ/L


() 


 


 


Carbon Dioxide Level


  25 mEQ/L


(20-30) 


 


 


Anion Gap 14 (5-15)   


 


Blood Urea Nitrogen


  34 mg/dL


(7-23)  H 


 


 


Creatinine


  5.2 mg/dL


(0.7-1.2)  H 


 


 


Estimat Glomerular Filtration


Rate 14.5 mL/min


(>60) 


 


 


Glucose Level


  89 mg/dL


() 


 


 


Calcium Level


  8.2 mg/dL


(8.6-10.2)  L 


 


 


Phosphorus Level


  5.3 mg/dL


(2.5-4.8)  H 


 


 


Total Bilirubin


  0.3 mg/dL


(0.0-1.2) 


 


 


Aspartate Amino Transf


(AST/SGOT) 17 U/L (5-40)  


  


 


 


Alanine Aminotransferase


(ALT/SGPT) 8 U/L (3-41)  


  


 


 


Alkaline Phosphatase


  48 U/L


() 


 


 


Total Protein


  6.0 g/dL


(6.6-8.7)  L 


 


 


Albumin


  2.3 g/dL


(3.5-5.2)  L 


 


 


Globulin 3.7 g/dL   


 


Albumin/Globulin Ratio


  0.6 (1.0-2.7)


L 


 


 


White Blood Count


  


  6.1 K/UL


(4.8-10.8)


 


Red Blood Count


  


  3.38 M/UL


(4.70-6.10)  L


 


Hemoglobin


  


  9.7 G/DL


(14.2-18.0)  L


 


Hematocrit


  


  30.1 %


(42.0-52.0)  L


 


Mean Corpuscular Volume  89 FL (80-99)  


 


Mean Corpuscular Hemoglobin


  


  28.6 PG


(27.0-31.0)


 


Mean Corpuscular Hemoglobin


Concent 


  32.2 G/DL


(32.0-36.0)


 


Red Cell Distribution Width


  


  11.5 %


(11.6-14.8)  L


 


Platelet Count


  


  337 K/UL


(150-450)


 


Mean Platelet Volume


  


  5.6 FL


(6.5-10.1)  L


 


Neutrophils (%) (Auto)


  


  62.9 %


(45.0-75.0)


 


Lymphocytes (%) (Auto)


  


  20.8 %


(20.0-45.0)


 


Monocytes (%) (Auto)


  


  10.7 %


(1.0-10.0)  H


 


Eosinophils (%) (Auto)


  


  3.8 %


(0.0-3.0)  H


 


Basophils (%) (Auto)


  


  1.8 %


(0.0-2.0)











Current Medications








 Medications


  (Trade)  Dose


 Ordered  Sig/Kathleen


 Route


 PRN Reason  Start Time


 Stop Time Status Last Admin


Dose Admin


 


 Acetaminophen


  (Tylenol)  650 mg  Q4H  PRN


 ORAL


 fever  1/2/17 23:45


 2/1/17 23:44  1/7/17 02:46


 


 


 Acetaminophen/


 Hydrocodone Bitart


  (Norco 10/325)  1 ea  Q4H  PRN


 ORAL


 For MODERATE Pain  1/18/17 15:03


 1/25/17 11:29  1/19/17 09:27


 


 


 Clonidine HCl


  (Catapres)  0.1 mg  Q4H  PRN


 ORAL


 bp over 170 syst  1/19/17 11:15


 2/18/17 11:14   


 


 


 Dextrose


  (Dextrose 50%)    STAT  PRN


 IV


 Hypoglycemia  1/2/17 23:45


 2/1/17 23:44   


 


 


 Docusate Sodium


  (Colace)  100 mg  THREE TIMES A  DAY


 ORAL


   1/13/17 13:00


 2/12/17 12:59  1/19/17 09:26


 


 


 Doxycycline


 Monohydrate


  (Vibramycin)  100 mg  EVERY 12  HOURS


 ORAL


   1/14/17 21:00


 1/21/17 20:59  1/17/17 20:37


 


 


 Heparin Sodium


  (Porcine)


  (Heparin 5000


 units/ml)  5,000 units  EVERY 12  HOURS


 SUBQ


   1/3/17 09:00


 2/2/17 08:59  1/17/17 20:39


 


 


 Morphine Sulfate


  (Morphine


 Sulfate)  2 mg  Q4H  PRN


 IVP


 PAIN UNRELIEVED BY NORCO  1/18/17 17:15


 1/25/17 17:14   


 


 


 Nifedipine


  (Procardia XL)  60 mg  BID


 ORAL


   1/19/17 12:00


 2/18/17 11:59  1/19/17 12:17


 


 


 Ondansetron HCl


  (Zofran)  4 mg  Q6H  PRN


 IVP


 Nausea & Vomiting  1/2/17 23:45


 2/1/17 23:44   


 


 


 Polyethylene


 Glycol


  (Miralax)  17 gm  HSPRN  PRN


 ORAL


 Constipation  1/2/17 23:45


 2/1/17 23:44   


 


 


 Quetiapine


 Fumarate


  (SEROquel)  25 mg  Q12HR


 ORAL


   1/15/17 11:00


 2/14/17 10:59  1/16/17 08:46


 


 


 Sevelamer


 Carbonate


  (Renvela)  2,400 mg  THREE TIMES A  DAY


 ORAL


   1/16/17 09:30


 2/15/17 09:29  1/18/17 17:55


 


 


 Tamsulosin HCl


  (Flomax)  0.4 mg  BID


 ORAL


   1/7/17 11:30


 2/6/17 11:29  1/18/17 17:55


 


 


 Zolpidem Tartrate


  (Ambien)  5 mg  HSPRN  PRN


 ORAL


 Insomnia  1/2/17 23:45


 2/1/17 23:44   


 

















TANIA FRANCIS Jan 19, 2017 16:20

## 2017-01-20 VITALS — SYSTOLIC BLOOD PRESSURE: 149 MMHG | DIASTOLIC BLOOD PRESSURE: 78 MMHG

## 2017-01-20 VITALS — SYSTOLIC BLOOD PRESSURE: 124 MMHG | DIASTOLIC BLOOD PRESSURE: 74 MMHG

## 2017-01-20 VITALS — DIASTOLIC BLOOD PRESSURE: 70 MMHG | SYSTOLIC BLOOD PRESSURE: 149 MMHG

## 2017-01-20 VITALS — DIASTOLIC BLOOD PRESSURE: 74 MMHG | SYSTOLIC BLOOD PRESSURE: 147 MMHG

## 2017-01-20 VITALS — DIASTOLIC BLOOD PRESSURE: 81 MMHG | SYSTOLIC BLOOD PRESSURE: 140 MMHG

## 2017-01-20 LAB
ALBUMIN/GLOB SERPL: 0.7 {RATIO} (ref 1–2.7)
ALT SERPL-CCNC: 7 U/L (ref 3–41)
ANION GAP SERPL CALC-SCNC: 13 MMOL/L (ref 5–15)
AST SERPL-CCNC: 17 U/L (ref 5–40)
BASOPHILS NFR BLD AUTO: 2.1 % (ref 0–2)
CALCIUM SERPL-MCNC: 8.2 MG/DL (ref 8.6–10.2)
CHLORIDE SERPL-SCNC: 102 MEQ/L (ref 98–107)
CO2 SERPL-SCNC: 28 MEQ/L (ref 20–30)
CREAT SERPL-MCNC: 4.3 MG/DL (ref 0.7–1.2)
EOSINOPHIL NFR BLD AUTO: 3.4 % (ref 0–3)
ERYTHROCYTE [DISTWIDTH] IN BLOOD BY AUTOMATED COUNT: 11.7 % (ref 11.6–14.8)
GFR SERPLBLD BASED ON 1.73 SQ M-ARVRAT: 18.1 ML/MIN (ref 60–?)
GLOBULIN SER-MCNC: 3.5 G/DL
HEMOLYSIS: 1
LYMPHOCYTES NFR BLD AUTO: 25.5 % (ref 20–45)
MAGNESIUM SERPL-MCNC: 1.7 MG/DL (ref 1.7–2.5)
MCH RBC QN AUTO: 28.7 PG (ref 27–31)
MCHC RBC AUTO-ENTMCNC: 32.5 G/DL (ref 32–36)
MCV RBC AUTO: 88 FL (ref 80–99)
MONOCYTES NFR BLD AUTO: 12.1 % (ref 1–10)
NEUTROPHILS NFR BLD AUTO: 56.9 % (ref 45–75)
PHOSPHATE SERPL-MCNC: 5.9 MG/DL (ref 2.5–4.8)
PLATELET # BLD: 302 K/UL (ref 150–450)
PMV BLD AUTO: 5.4 FL (ref 6.5–10.1)
POTASSIUM SERPL-SCNC: 3.7 MEQ/L (ref 3.4–4.9)
PROT SERPL-MCNC: 6.1 G/DL (ref 6.6–8.7)
RBC # BLD AUTO: 3.28 M/UL (ref 4.7–6.1)
SODIUM SERPL-SCNC: 143 MEQ/L (ref 135–145)
URATE SERPL-MCNC: 3.8 MG/DL (ref 3–7.5)
WBC # BLD AUTO: 5.9 K/UL (ref 4.8–10.8)

## 2017-01-20 RX ADMIN — DOCUSATE SODIUM SCH MG: 100 CAPSULE, LIQUID FILLED ORAL at 13:04

## 2017-01-20 RX ADMIN — HEPARIN SODIUM SCH UNITS: 5000 INJECTION INTRAVENOUS; SUBCUTANEOUS at 22:03

## 2017-01-20 RX ADMIN — DOCUSATE SODIUM SCH MG: 100 CAPSULE, LIQUID FILLED ORAL at 17:57

## 2017-01-20 RX ADMIN — TAMSULOSIN HYDROCHLORIDE SCH MG: 0.4 CAPSULE ORAL at 17:57

## 2017-01-20 RX ADMIN — HEPARIN SODIUM SCH UNITS: 5000 INJECTION INTRAVENOUS; SUBCUTANEOUS at 09:00

## 2017-01-20 RX ADMIN — HYDROCODONE BITARTRATE AND ACETAMINOPHEN PRN EA: 10; 325 TABLET ORAL at 17:57

## 2017-01-20 RX ADMIN — DOCUSATE SODIUM SCH MG: 100 CAPSULE, LIQUID FILLED ORAL at 09:00

## 2017-01-20 RX ADMIN — TAMSULOSIN HYDROCHLORIDE SCH MG: 0.4 CAPSULE ORAL at 09:00

## 2017-01-20 RX ADMIN — HYDROCODONE BITARTRATE AND ACETAMINOPHEN PRN EA: 10; 325 TABLET ORAL at 22:00

## 2017-01-20 RX ADMIN — HYDROCODONE BITARTRATE AND ACETAMINOPHEN PRN EA: 10; 325 TABLET ORAL at 04:46

## 2017-01-20 RX ADMIN — HYDROCODONE BITARTRATE AND ACETAMINOPHEN PRN EA: 10; 325 TABLET ORAL at 13:04

## 2017-01-20 NOTE — GENERAL PROGRESS NOTE
Assessment/Plan


Status:  stable


Assessment/Plan





Status:





Cellulitis left leg- Sepsis, received Amikacin and Vanco in the past.


Acute renal failure- 


HypoAlbuminemia , Proteinuria , ? NS


Multi drug abuse








Plan:





eventually had the catheter changed and dialysed -


last dialysis 1/19 next 1/21


change Norvasc to Procardiaxl BID


clonidin PRN





On Renagel-


On  flomax-





Urine studies- Urine Tox screen- MJ and Amphetamines


Monitor renal parameters- 


DC planning...


Per orders





Subjective


ROS Limited/Unobtainable:  No


Constitutional:  Reports: malaise


Allergies:  


Coded Allergies:  


     SULFUR (Unverified  Allergy, Unknown, unk, 1/7/17)


 Per patient allergic to sulfur





Objective





Last 24 Hour Vital Signs








  Date Time  Temp Pulse Resp B/P Pulse Ox O2 Delivery O2 Flow Rate FiO2


 


1/20/17 11:40 98.2 85 16 149/78 97 Room Air  


 


1/20/17 08:01 99.0 84 15 147/74 94 Room Air  


 


1/20/17 04:00 97.5 81 20 124/74 94 Nasal Cannula 2.0 


 


1/19/17 23:44 99.7 89 20 167/77 92 Room Air  


 


1/19/17 19:38      Room Air  


 


1/19/17 19:37  92 14   Room Air  


 


1/19/17 19:37     94 Room Air  


 


1/19/17 19:26 98.6       


 


1/19/17 19:00 97.9 100 20 166/120 92 Room Air  

















Intake and Output  


 


 1/19/17 1/20/17





 19:00 07:00


 


Intake Total 480 ml 600 ml


 


Output Total 1200 ml 500 ml


 


Balance -720 ml 100 ml


 


  


 


Intake Oral 480 ml 600 ml


 


Output Urine Total  500 ml


 


Hemodialysis UF 1200 ml 


 


# Voids  3








Laboratory Tests


1/20/17 06:30: 


White Blood Count 5.9, Red Blood Count 3.28L, Hemoglobin 9.4L, Hematocrit 29.0L

, Mean Corpuscular Volume 88, Mean Corpuscular Hemoglobin 28.7, Mean 

Corpuscular Hemoglobin Concent 32.5, Red Cell Distribution Width 11.7, Platelet 

Count 302, Mean Platelet Volume 5.4L, Neutrophils (%) (Auto) 56.9, Lymphocytes (

%) (Auto) 25.5, Monocytes (%) (Auto) 12.1H, Eosinophils (%) (Auto) 3.4H, 

Basophils (%) (Auto) 2.1H, Sodium Level 143, Potassium Level 3.7, Chloride 

Level 102, Carbon Dioxide Level 28, Anion Gap 13, Blood Urea Nitrogen 25H, 

Creatinine 4.3H, Estimat Glomerular Filtration Rate 18.1, Glucose Level 91, 

Uric Acid 3.8, Calcium Level 8.2L, Phosphorus Level 5.9H, Magnesium Level 1.7, 

Total Bilirubin < 0.2, Aspartate Amino Transf (AST/SGOT) 17, Alanine 

Aminotransferase (ALT/SGPT) 7, Alkaline Phosphatase 50, Total Protein 6.1L, 

Albumin 2.6L, Globulin 3.5, Albumin/Globulin Ratio 0.7L


Height (Feet):  6


Height (Inches):  1.00


Weight (Pounds):  200


General Appearance:  no apparent distress


Objective


no change in PE











ALISON ROTHMAN Jan 20, 2017 16:09

## 2017-01-20 NOTE — INFECTIOUS DISEASES PROG NOTE
Assessment/Plan


Assessment/Plan





ASSESSMENT:   46-year-old male with:





Left lower extremity cellulitis - improved SP Rx


   MRI : no evidence of associated abscess, fasciitis or osteomyelitis


   Doppler : No DVT 


Fever - resolved 


Leucocytosis - resolved 








ARF --> HD SP rupinder 1/9, exchanged 1/16 


   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 Medical renal disease suspected.


Polysubstance abuse - UDS(+) Amphetamines, opiates, marijuana, tobacco


Sulfa allergy


Full Code








PLAN:





ok to DC off of ABX from ID standpoint


  ( 1/19 SP PO doxycycline d# 7 )


  ( 1/14 SP Ceftaroline  d# 9 )


  ( 1/5 SP on  cefepime and  vancomycin d# 4 ) 


Monitor CBC, temperatures


Monitor BMP.


HD prn





Subjective


Allergies:  


Coded Allergies:  


     SULFUR (Unverified  Allergy, Unknown, unk, 1/7/17)


 Per patient allergic to sulfur


Subjective





remains afebrile.  no new complaint


DC planning ongoing





Objective


Vital Signs





Last 24 Hour Vital Signs








  Date Time  Temp Pulse Resp B/P Pulse Ox O2 Delivery O2 Flow Rate FiO2


 


1/20/17 11:40 98.2 85 16 149/78 97 Room Air  


 


1/20/17 08:01 99.0 84 15 147/74 94 Room Air  


 


1/20/17 04:00 97.5 81 20 124/74 94 Nasal Cannula 2.0 


 


1/19/17 23:44 99.7 89 20 167/77 92 Room Air  


 


1/19/17 19:38      Room Air  


 


1/19/17 19:37  92 14   Room Air  


 


1/19/17 19:37     94 Room Air  


 


1/19/17 19:26 98.6       


 


1/19/17 19:00 97.9 100 20 166/120 92 Room Air  


 


1/19/17 16:00 98.6 86 20 144/98 94 Room Air  








Height (Feet):  6


Height (Inches):  1.00


Weight (Pounds):  200


General Appearance:  no acute distress


Respiratory/Chest:  no respiratory distress


Cardiovascular:  normal rate, regular rhythm


Abdomen:  normal bowel sounds, soft, non tender, non distended





Laboratory Tests








Test


  1/20/17


06:30


 


White Blood Count


  5.9 K/UL


(4.8-10.8)


 


Red Blood Count


  3.28 M/UL


(4.70-6.10)  L


 


Hemoglobin


  9.4 G/DL


(14.2-18.0)  L


 


Hematocrit


  29.0 %


(42.0-52.0)  L


 


Mean Corpuscular Volume 88 FL (80-99)  


 


Mean Corpuscular Hemoglobin


  28.7 PG


(27.0-31.0)


 


Mean Corpuscular Hemoglobin


Concent 32.5 G/DL


(32.0-36.0)


 


Red Cell Distribution Width


  11.7 %


(11.6-14.8)


 


Platelet Count


  302 K/UL


(150-450)


 


Mean Platelet Volume


  5.4 FL


(6.5-10.1)  L


 


Neutrophils (%) (Auto)


  56.9 %


(45.0-75.0)


 


Lymphocytes (%) (Auto)


  25.5 %


(20.0-45.0)


 


Monocytes (%) (Auto)


  12.1 %


(1.0-10.0)  H


 


Eosinophils (%) (Auto)


  3.4 %


(0.0-3.0)  H


 


Basophils (%) (Auto)


  2.1 %


(0.0-2.0)  H


 


Sodium Level


  143 mEQ/L


(135-145)


 


Potassium Level


  3.7 mEQ/L


(3.4-4.9)


 


Chloride Level


  102 mEQ/L


()


 


Carbon Dioxide Level


  28 mEQ/L


(20-30)


 


Anion Gap 13 (5-15)  


 


Blood Urea Nitrogen


  25 mg/dL


(7-23)  H


 


Creatinine


  4.3 mg/dL


(0.7-1.2)  H


 


Estimat Glomerular Filtration


Rate 18.1 mL/min


(>60)


 


Glucose Level


  91 mg/dL


()


 


Uric Acid


  3.8 mg/dL


(3.0-7.5)


 


Calcium Level


  8.2 mg/dL


(8.6-10.2)  L


 


Phosphorus Level


  5.9 mg/dL


(2.5-4.8)  H


 


Magnesium Level


  1.7 mg/dL


(1.7-2.5)


 


Total Bilirubin


  < 0.2 mg/dL


(0.0-1.2)


 


Aspartate Amino Transf


(AST/SGOT) 17 U/L (5-40)  


 


 


Alanine Aminotransferase


(ALT/SGPT) 7 U/L (3-41)  


 


 


Alkaline Phosphatase


  50 U/L


()


 


Total Protein


  6.1 g/dL


(6.6-8.7)  L


 


Albumin


  2.6 g/dL


(3.5-5.2)  L


 


Globulin 3.5 g/dL  


 


Albumin/Globulin Ratio


  0.7 (1.0-2.7)


L











Current Medications








 Medications


  (Trade)  Dose


 Ordered  Sig/Kathleen


 Route


 PRN Reason  Start Time


 Stop Time Status Last Admin


Dose Admin


 


 Acetaminophen


  (Tylenol)  650 mg  Q4H  PRN


 ORAL


 fever  1/2/17 23:45


 2/1/17 23:44  1/7/17 02:46


 


 


 Acetaminophen/


 Hydrocodone Bitart


  (Norco 10/325)  1 ea  Q4H  PRN


 ORAL


 For MODERATE Pain  1/18/17 15:03


 1/25/17 11:29  1/20/17 13:04


 


 


 Clonidine HCl


  (Catapres)  0.1 mg  Q4H  PRN


 ORAL


 bp over 170 syst  1/19/17 11:15


 2/18/17 11:14   


 


 


 Dextrose


  (Dextrose 50%)    STAT  PRN


 IV


 Hypoglycemia  1/2/17 23:45


 2/1/17 23:44   


 


 


 Docusate Sodium


  (Colace)  100 mg  THREE TIMES A  DAY


 ORAL


   1/13/17 13:00


 2/12/17 12:59  1/20/17 13:04


 


 


 Heparin Sodium


  (Porcine)


  (Heparin 5000


 units/ml)  5,000 units  EVERY 12  HOURS


 SUBQ


   1/3/17 09:00


 2/2/17 08:59  1/17/17 20:39


 


 


 Morphine Sulfate


  (Morphine


 Sulfate)  2 mg  Q4H  PRN


 IVP


 PAIN UNRELIEVED BY FRANCIA  1/18/17 17:15


 1/25/17 17:14   


 


 


 Nifedipine


  (Procardia XL)  60 mg  BID


 ORAL


   1/19/17 12:00


 2/18/17 11:59  1/19/17 12:17


 


 


 Ondansetron HCl


  (Zofran)  4 mg  Q6H  PRN


 IVP


 Nausea & Vomiting  1/2/17 23:45


 2/1/17 23:44   


 


 


 Polyethylene


 Glycol


  (Miralax)  17 gm  HSPRN  PRN


 ORAL


 Constipation  1/2/17 23:45


 2/1/17 23:44   


 


 


 Quetiapine


 Fumarate


  (SEROquel)  25 mg  Q12HR


 ORAL


   1/15/17 11:00


 2/14/17 10:59  1/16/17 08:46


 


 


 Sevelamer


 Carbonate


  (Renvela)  2,400 mg  THREE TIMES A  DAY


 ORAL


   1/16/17 09:30


 2/15/17 09:29  1/20/17 13:04


 


 


 Tamsulosin HCl


  (Flomax)  0.4 mg  BID


 ORAL


   1/7/17 11:30


 2/6/17 11:29  1/19/17 18:28


 


 


 Zolpidem Tartrate


  (Ambien)  5 mg  HSPRN  PRN


 ORAL


 Insomnia  1/2/17 23:45


 2/1/17 23:44   


 

















TANIA FRANCIS Jan 20, 2017 15:56

## 2017-01-20 NOTE — PULMONOLOGY PROGRESS NOTE
Assessment/Plan


Assessment/Plan


ASSESSMENT


sepsis


cellulitis LLE 


pain LLE 


anemia 


ATN on chronic renal disease 


started on HD  


HTN


[id number] pulmonary HTN 


polysubstance abuse  





PLAN OF CARE  


[facility] floor  


s/p abx Rx, ID follows , blood  and urine cx   negative , 


MRI  tibia/fibula - no evidence of OM or abscess, no evidence of fasciitis or 

myositis, 


surgery  seen and evaluated 


no need for surgical intervention  


venous Duplex BLE negative  


pain management  


started on HD 


s/p Brain placement 1/9 and subsequent exchange to permacath 1/16


nephro follows


monitor renal parameters and lytes, avoid nephrotoxic 


renal US  - no hydro, echogenic kidneys, c/w medical renal disease 


urine tox screen + marijuana, amphetamines, opiates  


ECHO   with preserved EF and RVSP of 47 c/w moderate pulmonary HTN  


BP management with CCB


DVT prophylaxis


PT/OT 


bowel regimen  


dc plan need outpt HD arrangement  





case discussed and evaluated by supervising physician





Subjective


Allergies:  


Coded Allergies:  


     SULFUR (Unverified  Allergy, Unknown, unk, 1/7/17)


 Per patient allergic to sulfur


Subjective


afebrile, leukocytosis resolved 


on HD currently





Objective





Last 24 Hour Vital Signs








  Date Time  Temp Pulse Resp B/P Pulse Ox O2 Delivery O2 Flow Rate FiO2


 


1/20/17 04:00 97.5 81 20 124/74 94 Nasal Cannula 2.0 


 


1/19/17 23:44 99.7 89 20 167/77 92 Room Air  


 


1/19/17 19:38      Room Air  


 


1/19/17 19:37  92 14   Room Air  


 


1/19/17 19:37     94 Room Air  


 


1/19/17 19:26 98.6       


 


1/19/17 19:00 97.9 100 20 166/120 92 Room Air  


 


1/19/17 16:00 98.6 86 20 144/98 94 Room Air  


 


1/19/17 13:15      Room Air 2.0 28


 


1/19/17 12:17  80  167/101    


 


1/19/17 12:10      Room Air  


 


1/19/17 11:11      Simple Mask 2.0 


 


1/19/17 11:10 97.0 78 18 170/102  Nasal Cannula 2.0 


 


1/19/17 10:10  79  170/102    


 


1/19/17 08:15 98.1 82 21 154/110 97 Room Air  


 


1/19/17 08:10 96.8 74 20 179/104 97 Nasal Cannula 2.0 


 


1/19/17 08:10      Nasal Cannula 2.0 

















Intake and Output  


 


 1/19/17 1/20/17





 19:00 07:00


 


Intake Total 480 ml 600 ml


 


Output Total 1200 ml 500 ml


 


Balance -720 ml 100 ml


 


  


 


Intake Oral 480 ml 600 ml


 


Output Urine Total  500 ml


 


Hemodialysis UF 1200 ml 


 


# Voids  3








Objective


General: NAD, A/A/O x 3 male 


HEENT: NC/AT, EOMI PERRLA, OP moist


Respiratory/Chest:  chest wall non-tender, lungs clear, no respiratory distress

, no accessory muscle use, RIJ HD catheter, intact 


Cardiovascular:  normal peripheral pulses, normal rate, regular rhythm


Abdomen:  normal bowel sounds, soft, non tender, non distended


Extremities:  no cyanosis, no clubbing,  left leg edema and erythema improved. 

  


Skin: left leg edema and erythema improved.   multiple all over the body tattoos


Neurologic/Psychiatric:  CNs II-XII grossly normal, no motor/sensory deficits, 

alert, oriented x 3, responsive


Lymphatic:  no neck adenopathy


Musculoskeletal:  normal muscle bulk


Laboratory Tests


1/19/17 11:00: 


White Blood Count 6.1, Red Blood Count 3.38L, Hemoglobin 9.7L, Hematocrit 30.1L

, Mean Corpuscular Volume 89, Mean Corpuscular Hemoglobin 28.6, Mean 

Corpuscular Hemoglobin Concent 32.2, Red Cell Distribution Width 11.5L, 

Platelet Count 337, Mean Platelet Volume 5.6L, Neutrophils (%) (Auto) 62.9, 

Lymphocytes (%) (Auto) 20.8, Monocytes (%) (Auto) 10.7H, Eosinophils (%) (Auto) 

3.8H, Basophils (%) (Auto) 1.8


1/20/17 06:30: 


White Blood Count [Pending], Red Blood Count [Pending], Hemoglobin [Pending], 

Hematocrit [Pending], Mean Corpuscular Volume [Pending], Mean Corpuscular 

Hemoglobin [Pending], Mean Corpuscular Hemoglobin Concent [Pending], Red Cell 

Distribution Width [Pending], Platelet Count [Pending], Mean Platelet Volume [

Pending], Neutrophils (%) (Auto) [Pending], Lymphocytes (%) (Auto) [Pending], 

Monocytes (%) (Auto) [Pending], Eosinophils (%) (Auto) [Pending], Basophils (%) 

(Auto) [Pending], Sodium Level [Pending], Potassium Level [Pending], Chloride 

Level [Pending], Carbon Dioxide Level [Pending], Blood Urea Nitrogen [Pending], 

Creatinine [Pending], Estimat Glomerular Filtration Rate [Pending], Glucose 

Level [Pending], Uric Acid [Pending], Calcium Level [Pending], Phosphorus Level 

[Pending], Magnesium Level [Pending], Total Bilirubin [Pending], Aspartate 

Amino Transf (AST/SGOT) [Pending], Alanine Aminotransferase (ALT/SGPT) [Pending]

, Alkaline Phosphatase [Pending], Total Protein [Pending], Albumin [Pending], 

Globulin [Pending]





Current Medications








 Medications


  (Trade)  Dose


 Ordered  Sig/Kathleen


 Route


 PRN Reason  Start Time


 Stop Time Status Last Admin


Dose Admin


 


 Acetaminophen


  (Tylenol)  650 mg  Q4H  PRN


 ORAL


 fever  1/2/17 23:45


 2/1/17 23:44  1/7/17 02:46


 


 


 Acetaminophen/


 Hydrocodone Bitart


  (Norco 10/325)  1 ea  Q4H  PRN


 ORAL


 For MODERATE Pain  1/18/17 15:03


 1/25/17 11:29  1/20/17 04:46


 


 


 Clonidine HCl


  (Catapres)  0.1 mg  Q4H  PRN


 ORAL


 bp over 170 syst  1/19/17 11:15


 2/18/17 11:14   


 


 


 Dextrose


  (Dextrose 50%)    STAT  PRN


 IV


 Hypoglycemia  1/2/17 23:45


 2/1/17 23:44   


 


 


 Docusate Sodium


  (Colace)  100 mg  THREE TIMES A  DAY


 ORAL


   1/13/17 13:00


 2/12/17 12:59  1/19/17 18:28


 


 


 Heparin Sodium


  (Porcine)


  (Heparin 5000


 units/ml)  5,000 units  EVERY 12  HOURS


 SUBQ


   1/3/17 09:00


 2/2/17 08:59  1/17/17 20:39


 


 


 Morphine Sulfate


  (Morphine


 Sulfate)  2 mg  Q4H  PRN


 IVP


 PAIN UNRELIEVED BY NORCO  1/18/17 17:15


 1/25/17 17:14   


 


 


 Nifedipine


  (Procardia XL)  60 mg  BID


 ORAL


   1/19/17 12:00


 2/18/17 11:59  1/19/17 12:17


 


 


 Ondansetron HCl


  (Zofran)  4 mg  Q6H  PRN


 IVP


 Nausea & Vomiting  1/2/17 23:45


 2/1/17 23:44   


 


 


 Polyethylene


 Glycol


  (Miralax)  17 gm  HSPRN  PRN


 ORAL


 Constipation  1/2/17 23:45


 2/1/17 23:44   


 


 


 Quetiapine


 Fumarate


  (SEROquel)  25 mg  Q12HR


 ORAL


   1/15/17 11:00


 2/14/17 10:59  1/16/17 08:46


 


 


 Sevelamer


 Carbonate


  (Renvela)  2,400 mg  THREE TIMES A  DAY


 ORAL


   1/16/17 09:30


 2/15/17 09:29  1/19/17 18:27


 


 


 Tamsulosin HCl


  (Flomax)  0.4 mg  BID


 ORAL


   1/7/17 11:30


 2/6/17 11:29  1/19/17 18:28


 


 


 Zolpidem Tartrate


  (Ambien)  5 mg  HSPRN  PRN


 ORAL


 Insomnia  1/2/17 23:45


 2/1/17 23:44   


 

















Ruben GiordanoArnot Ogden Medical CenterMargarette Yang NP Jan 20, 2017 07:17

## 2017-01-21 VITALS — DIASTOLIC BLOOD PRESSURE: 85 MMHG | SYSTOLIC BLOOD PRESSURE: 142 MMHG

## 2017-01-21 VITALS — DIASTOLIC BLOOD PRESSURE: 83 MMHG | SYSTOLIC BLOOD PRESSURE: 129 MMHG

## 2017-01-21 VITALS — DIASTOLIC BLOOD PRESSURE: 102 MMHG | SYSTOLIC BLOOD PRESSURE: 163 MMHG

## 2017-01-21 VITALS — DIASTOLIC BLOOD PRESSURE: 76 MMHG | SYSTOLIC BLOOD PRESSURE: 145 MMHG

## 2017-01-21 VITALS — SYSTOLIC BLOOD PRESSURE: 134 MMHG | DIASTOLIC BLOOD PRESSURE: 96 MMHG

## 2017-01-21 VITALS — SYSTOLIC BLOOD PRESSURE: 154 MMHG | DIASTOLIC BLOOD PRESSURE: 78 MMHG

## 2017-01-21 VITALS — SYSTOLIC BLOOD PRESSURE: 150 MMHG | DIASTOLIC BLOOD PRESSURE: 93 MMHG

## 2017-01-21 LAB
ALBUMIN/GLOB SERPL: 0.7 {RATIO} (ref 1–2.7)
ALT SERPL-CCNC: 8 U/L (ref 3–41)
ANION GAP SERPL CALC-SCNC: 13 MMOL/L (ref 5–15)
AST SERPL-CCNC: 19 U/L (ref 5–40)
BASOPHILS NFR BLD MANUAL: 0 % (ref 0–2)
CALCIUM SERPL-MCNC: 8.5 MG/DL (ref 8.6–10.2)
CHLORIDE SERPL-SCNC: 99 MEQ/L (ref 98–107)
CO2 SERPL-SCNC: 28 MEQ/L (ref 20–30)
CREAT SERPL-MCNC: 4.4 MG/DL (ref 0.7–1.2)
CRP SERPL-MCNC: 3.1 MG/DL (ref ?–0.5)
EOSINOPHIL NFR BLD MANUAL: 6 % (ref 0–3)
ERYTHROCYTE [DISTWIDTH] IN BLOOD BY AUTOMATED COUNT: 11.8 % (ref 11.6–14.8)
GFR SERPLBLD BASED ON 1.73 SQ M-ARVRAT: 17.7 ML/MIN (ref 60–?)
GLOBULIN SER-MCNC: 3.7 G/DL
HEMOLYSIS: 6
MAGNESIUM SERPL-MCNC: 1.7 MG/DL (ref 1.7–2.5)
MCH RBC QN AUTO: 28.3 PG (ref 27–31)
MCHC RBC AUTO-ENTMCNC: 31.1 G/DL (ref 32–36)
MCV RBC AUTO: 91 FL (ref 80–99)
NEUTS BAND NFR BLD MANUAL: 2 % (ref 0–8)
NEUTS BAND NFR BLD MANUAL: 69 % (ref 45–75)
PHOSPHATE SERPL-MCNC: 5.5 MG/DL (ref 2.5–4.8)
PLAT MORPH BLD: NORMAL
PLATELET # BLD EST: ADEQUATE 10*3/UL
PLATELET # BLD: 149 K/UL (ref 150–450)
PMV BLD AUTO: 5.8 FL (ref 6.5–10.1)
POTASSIUM SERPL-SCNC: 3.7 MEQ/L (ref 3.4–4.9)
PROT SERPL-MCNC: 6.5 G/DL (ref 6.6–8.7)
RBC # BLD AUTO: 6.24 M/UL (ref 4.7–6.1)
RBC MORPH BLD: NORMAL
SODIUM SERPL-SCNC: 140 MEQ/L (ref 135–145)
TOTAL CELLS COUNTED BLD: 100
URATE SERPL-MCNC: 4 MG/DL (ref 3–7.5)
VARIANT LYMPHS NFR BLD MANUAL: 16 % (ref 20–45)
WBC # BLD AUTO: 2.5 K/UL (ref 4.8–10.8)

## 2017-01-21 RX ADMIN — HYDROCODONE BITARTRATE AND ACETAMINOPHEN PRN EA: 10; 325 TABLET ORAL at 08:05

## 2017-01-21 RX ADMIN — DOCUSATE SODIUM SCH MG: 100 CAPSULE, LIQUID FILLED ORAL at 08:06

## 2017-01-21 RX ADMIN — HEPARIN SODIUM SCH UNITS: 5000 INJECTION INTRAVENOUS; SUBCUTANEOUS at 08:12

## 2017-01-21 RX ADMIN — TAMSULOSIN HYDROCHLORIDE SCH MG: 0.4 CAPSULE ORAL at 11:00

## 2017-01-21 RX ADMIN — HEPARIN SODIUM SCH UNITS: 5000 INJECTION INTRAVENOUS; SUBCUTANEOUS at 21:00

## 2017-01-21 RX ADMIN — HYDROCODONE BITARTRATE AND ACETAMINOPHEN PRN EA: 10; 325 TABLET ORAL at 14:35

## 2017-01-21 RX ADMIN — DOCUSATE SODIUM SCH MG: 100 CAPSULE, LIQUID FILLED ORAL at 12:22

## 2017-01-21 RX ADMIN — DOCUSATE SODIUM SCH MG: 100 CAPSULE, LIQUID FILLED ORAL at 18:00

## 2017-01-21 RX ADMIN — TAMSULOSIN HYDROCHLORIDE SCH MG: 0.4 CAPSULE ORAL at 18:00

## 2017-01-21 NOTE — PULMONOLOGY PROGRESS NOTE
Assessment/Plan


Assessment/Plan


ASSESSMENT


sepsis


cellulitis LLE 


pain LLE 


anemia 


ATN on chronic renal disease 


started on HD  


HTN


moderate pulmonary HTN 


polysubstance abuse  





PLAN OF CARE  


MS floor  


s/p abx Rx, ID follows , blood  and urine cx   negative , 


MRI  tibia/fibula - no evidence of OM or abscess, no evidence of fasciitis or 

myositis, 


surgery  seen and evaluated 


no need for surgical intervention  


venous Duplex BLE negative  


pain management  


started on HD 


s/p Brain placement 1/9 and subsequent exchange to permacath 1/16


nephro follows


monitor renal parameters and lytes, 


avoid nephrotoxic 


renal US  - no hydro, echogenic kidneys, c/w medical renal disease 


urine tox screen + marijuana, amphetamines, opiates  


ECHO   with preserved EF and RVSP of 47 c/w moderate pulmonary HTN  


BP management with CCB


DVT prophylaxis


PT/OT 


bowel regimen  


dc plan need outpt HD arrangement  





case discussed and evaluated by supervising physician





Subjective


Allergies:  


Coded Allergies:  


     SULFUR (Unverified  Allergy, Unknown, unk, 1/7/17)


 Per patient allergic to sulfur


Subjective


afebrile, leukocytosis resolved 


on HD currently





Objective





Last 24 Hour Vital Signs








  Date Time  Temp Pulse Resp B/P Pulse Ox O2 Delivery O2 Flow Rate FiO2


 


1/21/17 11:51 97.9 76 16 142/85  Room Air  


 


1/21/17 11:24      Room Air  


 


1/21/17 11:01  83  156/78    


 


1/21/17 10:30 97.0 82 18 154/78 96 Room Air  


 


1/21/17 09:04 98.1       


 


1/21/17 08:51 98.1 83 18 163/102 98 Room Air  


 


1/21/17 07:30      Nasal Cannula 2.0 


 


1/21/17 07:30 98.0 83 20 163/102 98 Nasal Cannula 2.0 


 


1/21/17 04:00 97.7 76 20 150/93 95 Room Air  


 


1/21/17 00:00 97.5 71 20 145/76 96 Nasal Cannula 2.0 


 


1/20/17 19:00 98.0 79 18 140/81 96 Room Air  


 


1/20/17 19:00     96 Room Air  


 


1/20/17 19:00      Room Air  


 


1/20/17 19:00  92 16   Room Air  


 


1/20/17 17:57  86  149/70    


 


1/20/17 16:00 98.1 86 20 149/70 96 Room Air  

















Intake and Output  


 


 1/20/17 1/21/17





 19:00 07:00


 


Intake Total 1500 ml 480 ml


 


Output Total 800 ml 700 ml


 


Balance 700 ml -220 ml


 


  


 


Intake Oral 1500 ml 480 ml


 


Output Urine Total 800 ml 700 ml


 


# Voids  4








Objective


General: NAD, A/A/O x 3 male 


HEENT: NC/AT, EOMI PERRLA, OP moist


Respiratory/Chest:  chest wall non-tender, lungs clear, no respiratory distress

, no accessory muscle use, RIJ HD catheter, intact 


Cardiovascular:  normal peripheral pulses, normal rate, regular rhythm


Abdomen:  normal bowel sounds, soft, non tender, non distended


Extremities:  no cyanosis, no clubbing, 


Skin: left leg edema and erythema improved.    multiple all over the body 

tattoos


Neurologic/Psychiatric:  CNs II-XII grossly normal, no motor/sensory deficits, 

alert, oriented x 3, responsive


Lymphatic:  no neck adenopathy


Musculoskeletal:  normal muscle bulk


Laboratory Tests


1/21/17 07:30: 


White Blood Count 2.5#L, Red Blood Count 6.24H, Hemoglobin 17.7#, Hematocrit 

56.9#H, Mean Corpuscular Volume 91, Mean Corpuscular Hemoglobin 28.3, Mean 

Corpuscular Hemoglobin Concent 31.1L, Red Cell Distribution Width 11.8, 

Platelet Count 149#L, Mean Platelet Volume 5.8L, Neutrophils (%) (Auto) , 

Lymphocytes (%) (Auto) , Monocytes (%) (Auto) , Eosinophils (%) (Auto) , 

Basophils (%) (Auto) , Differential Total Cells Counted 100, Neutrophils % (

Manual) 69, Lymphocytes % (Manual) 16L, Monocytes % (Manual) 7, Eosinophils % (

Manual) 6H, Basophils % (Manual) 0, Band Neutrophils 2, Platelet Estimate 

Adequate, Platelet Morphology Normal, Red Blood Cell Morphology Normal, Sodium 

Level 140, Potassium Level 3.7, Chloride Level 99, Carbon Dioxide Level 28, 

Anion Gap 13, Blood Urea Nitrogen 24H, Creatinine 4.4H, Estimat Glomerular 

Filtration Rate 17.7, Glucose Level 93, Uric Acid 4.0, Calcium Level 8.5L, 

Phosphorus Level 5.5H, Magnesium Level 1.7, Total Bilirubin 0.2, Aspartate 

Amino Transf (AST/SGOT) 19, Alanine Aminotransferase (ALT/SGPT) 8, Alkaline 

Phosphatase 53, C-Reactive Protein, Quantitative 3.1H, Pro-B-Type Natriuretic 

Peptide 1556H, Total Protein 6.5L, Albumin 2.8L, Globulin 3.7, Albumin/Globulin 

Ratio 0.7L





Current Medications








 Medications


  (Trade)  Dose


 Ordered  Sig/Kathleen


 Route


 PRN Reason  Start Time


 Stop Time Status Last Admin


Dose Admin


 


 Acetaminophen


  (Tylenol)  650 mg  Q4H  PRN


 ORAL


 fever  1/2/17 23:45


 2/1/17 23:44  1/7/17 02:46


 


 


 Acetaminophen/


 Hydrocodone Bitart


  (Norco 10/325)  1 ea  Q4H  PRN


 ORAL


 For MODERATE Pain  1/18/17 15:03


 1/25/17 11:29  1/21/17 08:05


 


 


 Clonidine HCl


  (Catapres)  0.1 mg  Q4H  PRN


 ORAL


 bp over 170 syst  1/19/17 11:15


 2/18/17 11:14   


 


 


 Dextrose


  (Dextrose 50%)    STAT  PRN


 IV


 Hypoglycemia  1/2/17 23:45


 2/1/17 23:44   


 


 


 Docusate Sodium


  (Colace)  100 mg  THREE TIMES A  DAY


 ORAL


   1/13/17 13:00


 2/12/17 12:59  1/21/17 12:22


 


 


 Heparin Sodium


  (Porcine)


  (Heparin 5000


 units/ml)  5,000 units  EVERY 12  HOURS


 SUBQ


   1/3/17 09:00


 2/2/17 08:59  1/21/17 08:12


 


 


 Morphine Sulfate


  (Morphine


 Sulfate)  2 mg  Q4H  PRN


 IVP


 PAIN UNRELIEVED BY NORCO  1/18/17 17:15


 1/25/17 17:14   


 


 


 Nifedipine


  (Procardia XL)  60 mg  BID


 ORAL


   1/19/17 12:00


 2/18/17 11:59  1/21/17 11:01


 


 


 Ondansetron HCl


  (Zofran)  4 mg  Q6H  PRN


 IVP


 Nausea & Vomiting  1/2/17 23:45


 2/1/17 23:44   


 


 


 Polyethylene


 Glycol


  (Miralax)  17 gm  HSPRN  PRN


 ORAL


 Constipation  1/2/17 23:45


 2/1/17 23:44   


 


 


 Quetiapine


 Fumarate


  (SEROquel)  25 mg  Q12HR


 ORAL


   1/15/17 11:00


 2/14/17 10:59  1/16/17 08:46


 


 


 Sevelamer


 Carbonate


  (Renvela)  2,400 mg  THREE TIMES A  DAY


 ORAL


   1/16/17 09:30


 2/15/17 09:29  1/21/17 12:23


 


 


 Tamsulosin HCl


  (Flomax)  0.4 mg  BID


 ORAL


   1/7/17 11:30


 2/6/17 11:29  1/21/17 11:00


 


 


 Zolpidem Tartrate


  (Ambien)  5 mg  HSPRN  PRN


 ORAL


 Insomnia  1/2/17 23:45


 2/1/17 23:44   


 

















Ruben ([facility])Margarette NP Jan 21, 2017 14:32

## 2017-01-21 NOTE — GENERAL PROGRESS NOTE
Assessment/Plan


Status:  stable


Assessment/Plan





Status:





Cellulitis left leg- Sepsis, received Amikacin and Vanco in the past.


Acute renal failure- 


HypoAlbuminemia , Proteinuria , ? NS


Multi drug abuse








Plan:





eventually had the catheter changed and dialysed -


last dialysis 1/19 next 1/21


change Norvasc to Procardiaxl BID


clonidin PRN





On Renagel-


On  flomax-





Urine studies- Urine Tox screen- MJ and Amphetamines


Monitor renal parameters- 


DC planning...


Per orders





Subjective


ROS Limited/Unobtainable:  No


Constitutional:  Reports: malaise


Allergies:  


Coded Allergies:  


     SULFUR (Unverified  Allergy, Unknown, unk, 1/7/17)


 Per patient allergic to sulfur





Objective





Last 24 Hour Vital Signs








  Date Time  Temp Pulse Resp B/P Pulse Ox O2 Delivery O2 Flow Rate FiO2


 


1/21/17 11:51 97.9 76 16 142/85  Room Air  


 


1/21/17 11:24      Room Air  


 


1/21/17 11:01  83  156/78    


 


1/21/17 10:30 97.0 82 18 154/78 96 Room Air  


 


1/21/17 09:04 98.1       


 


1/21/17 08:51 98.1 83 18 163/102 98 Room Air  


 


1/21/17 07:30      Nasal Cannula 2.0 


 


1/21/17 07:30 98.0 83 20 163/102 98 Nasal Cannula 2.0 


 


1/21/17 04:00 97.7 76 20 150/93 95 Room Air  


 


1/21/17 00:00 97.5 71 20 145/76 96 Nasal Cannula 2.0 


 


1/20/17 19:00 98.0 79 18 140/81 96 Room Air  


 


1/20/17 19:00     96 Room Air  


 


1/20/17 19:00      Room Air  


 


1/20/17 19:00  92 16   Room Air  


 


1/20/17 17:57  86  149/70    


 


1/20/17 16:00 98.1 86 20 149/70 96 Room Air  

















Intake and Output  


 


 1/20/17 1/21/17





 19:00 07:00


 


Intake Total 1500 ml 480 ml


 


Output Total 800 ml 700 ml


 


Balance 700 ml -220 ml


 


  


 


Intake Oral 1500 ml 480 ml


 


Output Urine Total 800 ml 700 ml


 


# Voids  4








Laboratory Tests


1/21/17 07:30: 


White Blood Count 2.5#L, Red Blood Count 6.24H, Hemoglobin 17.7#, Hematocrit 

56.9#H, Mean Corpuscular Volume 91, Mean Corpuscular Hemoglobin 28.3, Mean 

Corpuscular Hemoglobin Concent 31.1L, Red Cell Distribution Width 11.8, 

Platelet Count 149#L, Mean Platelet Volume 5.8L, Neutrophils (%) (Auto) , 

Lymphocytes (%) (Auto) , Monocytes (%) (Auto) , Eosinophils (%) (Auto) , 

Basophils (%) (Auto) , Differential Total Cells Counted 100, Neutrophils % (

Manual) 69, Lymphocytes % (Manual) 16L, Monocytes % (Manual) 7, Eosinophils % (

Manual) 6H, Basophils % (Manual) 0, Band Neutrophils 2, Platelet Estimate 

Adequate, Platelet Morphology Normal, Red Blood Cell Morphology Normal, Sodium 

Level 140, Potassium Level 3.7, Chloride Level 99, Carbon Dioxide Level 28, 

Anion Gap 13, Blood Urea Nitrogen 24H, Creatinine 4.4H, Estimat Glomerular 

Filtration Rate 17.7, Glucose Level 93, Uric Acid 4.0, Calcium Level 8.5L, 

Phosphorus Level 5.5H, Magnesium Level 1.7, Total Bilirubin 0.2, Aspartate 

Amino Transf (AST/SGOT) 19, Alanine Aminotransferase (ALT/SGPT) 8, Alkaline 

Phosphatase 53, C-Reactive Protein, Quantitative 3.1H, Pro-B-Type Natriuretic 

Peptide 1556H, Total Protein 6.5L, Albumin 2.8L, Globulin 3.7, Albumin/Globulin 

Ratio 0.7L


Height (Feet):  6


Height (Inches):  1.00


Weight (Pounds):  200


General Appearance:  no apparent distress


Objective


no change in PE











ALISON ROTHMAN Jan 21, 2017 14:46

## 2017-01-21 NOTE — INFECTIOUS DISEASES PROG NOTE
Assessment/Plan


Assessment/Plan





ASSESSMENT:   46-year-old male with:





Left lower extremity cellulitis - improved SP Rx


   MRI : no evidence of associated abscess, fasciitis or osteomyelitis


   Doppler : No DVT 


Fever - resolved 


Leucocytosis - resolved 








ARF --> HD SP rupinder 1/9, exchanged 1/16 


   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 Medical renal disease suspected.


Polysubstance abuse - UDS(+) Amphetamines, opiates, marijuana, tobacco


Sulfa allergy


Full Code








PLAN:





ok to DC off of ABX from ID standpoint


  ( 1/19 SP PO doxycycline d# 7 )


  ( 1/14 SP Ceftaroline  d# 9 )


  ( 1/5 SP on  cefepime and  vancomycin d# 4 ) 


Monitor CBC, temperatures


Monitor BMP.


HD prn





Subjective


Allergies:  


Coded Allergies:  


     SULFUR (Unverified  Allergy, Unknown, unk, 1/7/17)


 Per patient allergic to sulfur





Objective


Vital Signs





Last 24 Hour Vital Signs








  Date Time  Temp Pulse Resp B/P Pulse Ox O2 Delivery O2 Flow Rate FiO2


 


1/21/17 11:51 97.9 76 16 142/85  Room Air  


 


1/21/17 11:24      Room Air  


 


1/21/17 11:01  83  156/78    


 


1/21/17 10:30 97.0 82 18 154/78 96 Room Air  


 


1/21/17 09:04 98.1       


 


1/21/17 08:51 98.1 83 18 163/102 98 Room Air  


 


1/21/17 07:30      Nasal Cannula 2.0 


 


1/21/17 07:30 98.0 83 20 163/102 98 Nasal Cannula 2.0 


 


1/21/17 04:00 97.7 76 20 150/93 95 Room Air  


 


1/21/17 00:00 97.5 71 20 145/76 96 Nasal Cannula 2.0 


 


1/20/17 19:00 98.0 79 18 140/81 96 Room Air  


 


1/20/17 19:00     96 Room Air  


 


1/20/17 19:00      Room Air  


 


1/20/17 19:00  92 16   Room Air  


 


1/20/17 17:57  86  149/70    


 


1/20/17 16:00 98.1 86 20 149/70 96 Room Air  








Height (Feet):  6


Height (Inches):  1.00


Weight (Pounds):  200


Respiratory/Chest:  lungs clear


Cardiovascular:  normal rate, regular rhythm


Abdomen:  soft, non tender, no organomegaly





Laboratory Tests








Test


  1/21/17


07:30


 


White Blood Count


  2.5 K/UL


(4.8-10.8)  #L


 


Red Blood Count


  6.24 M/UL


(4.70-6.10)  H


 


Hemoglobin


  17.7 G/DL


(14.2-18.0)  #


 


Hematocrit


  56.9 %


(42.0-52.0)  #H


 


Mean Corpuscular Volume 91 FL (80-99)  


 


Mean Corpuscular Hemoglobin


  28.3 PG


(27.0-31.0)


 


Mean Corpuscular Hemoglobin


Concent 31.1 G/DL


(32.0-36.0)  L


 


Red Cell Distribution Width


  11.8 %


(11.6-14.8)


 


Platelet Count


  149 K/UL


(150-450)  #L


 


Mean Platelet Volume


  5.8 FL


(6.5-10.1)  L


 


Neutrophils (%) (Auto)


  % (45.0-75.0)


 


 


Lymphocytes (%) (Auto)


  % (20.0-45.0)


 


 


Monocytes (%) (Auto)  % (1.0-10.0)  


 


Eosinophils (%) (Auto)  % (0.0-3.0)  


 


Basophils (%) (Auto)  % (0.0-2.0)  


 


Differential Total Cells


Counted 100  


 


 


Neutrophils % (Manual) 69 % (45-75)  


 


Lymphocytes % (Manual) 16 % (20-45)  L


 


Monocytes % (Manual) 7 % (1-10)  


 


Eosinophils % (Manual) 6 % (0-3)  H


 


Basophils % (Manual) 0 % (0-2)  


 


Band Neutrophils 2 % (0-8)  


 


Platelet Estimate Adequate  


 


Platelet Morphology Normal  


 


Red Blood Cell Morphology Normal  


 


Sodium Level


  140 mEQ/L


(135-145)


 


Potassium Level


  3.7 mEQ/L


(3.4-4.9)


 


Chloride Level


  99 mEQ/L


()


 


Carbon Dioxide Level


  28 mEQ/L


(20-30)


 


Anion Gap 13 (5-15)  


 


Blood Urea Nitrogen


  24 mg/dL


(7-23)  H


 


Creatinine


  4.4 mg/dL


(0.7-1.2)  H


 


Estimat Glomerular Filtration


Rate 17.7 mL/min


(>60)


 


Glucose Level


  93 mg/dL


()


 


Uric Acid


  4.0 mg/dL


(3.0-7.5)


 


Calcium Level


  8.5 mg/dL


(8.6-10.2)  L


 


Phosphorus Level


  5.5 mg/dL


(2.5-4.8)  H


 


Magnesium Level


  1.7 mg/dL


(1.7-2.5)


 


Total Bilirubin


  0.2 mg/dL


(0.0-1.2)


 


Aspartate Amino Transf


(AST/SGOT) 19 U/L (5-40)  


 


 


Alanine Aminotransferase


(ALT/SGPT) 8 U/L (3-41)  


 


 


Alkaline Phosphatase


  53 U/L


()


 


C-Reactive Protein,


Quantitative 3.1 mg/dL (<


0.5)  H


 


Pro-B-Type Natriuretic Peptide


  1556 pg/mL


(0-125)  H


 


Total Protein


  6.5 g/dL


(6.6-8.7)  L


 


Albumin


  2.8 g/dL


(3.5-5.2)  L


 


Globulin 3.7 g/dL  


 


Albumin/Globulin Ratio


  0.7 (1.0-2.7)


L











Current Medications








 Medications


  (Trade)  Dose


 Ordered  Sig/Kathleen


 Route


 PRN Reason  Start Time


 Stop Time Status Last Admin


Dose Admin


 


 Acetaminophen


  (Tylenol)  650 mg  Q4H  PRN


 ORAL


 fever  1/2/17 23:45


 2/1/17 23:44  1/7/17 02:46


 


 


 Acetaminophen/


 Hydrocodone Bitart


  (Norco 10/325)  1 ea  Q4H  PRN


 ORAL


 For MODERATE Pain  1/18/17 15:03


 1/25/17 11:29  1/21/17 08:05


 


 


 Clonidine HCl


  (Catapres)  0.1 mg  Q4H  PRN


 ORAL


 bp over 170 syst  1/19/17 11:15


 2/18/17 11:14   


 


 


 Dextrose


  (Dextrose 50%)    STAT  PRN


 IV


 Hypoglycemia  1/2/17 23:45


 2/1/17 23:44   


 


 


 Docusate Sodium


  (Colace)  100 mg  THREE TIMES A  DAY


 ORAL


   1/13/17 13:00


 2/12/17 12:59  1/21/17 12:22


 


 


 Heparin Sodium


  (Porcine)


  (Heparin 5000


 units/ml)  5,000 units  EVERY 12  HOURS


 SUBQ


   1/3/17 09:00


 2/2/17 08:59  1/21/17 08:12


 


 


 Morphine Sulfate


  (Morphine


 Sulfate)  2 mg  Q4H  PRN


 IVP


 PAIN UNRELIEVED BY NORCO  1/18/17 17:15


 1/25/17 17:14   


 


 


 Nifedipine


  (Procardia XL)  60 mg  BID


 ORAL


   1/19/17 12:00


 2/18/17 11:59  1/21/17 11:01


 


 


 Ondansetron HCl


  (Zofran)  4 mg  Q6H  PRN


 IVP


 Nausea & Vomiting  1/2/17 23:45


 2/1/17 23:44   


 


 


 Polyethylene


 Glycol


  (Miralax)  17 gm  HSPRN  PRN


 ORAL


 Constipation  1/2/17 23:45


 2/1/17 23:44   


 


 


 Quetiapine


 Fumarate


  (SEROquel)  25 mg  Q12HR


 ORAL


   1/15/17 11:00


 2/14/17 10:59  1/16/17 08:46


 


 


 Sevelamer


 Carbonate


  (Renvela)  2,400 mg  THREE TIMES A  DAY


 ORAL


   1/16/17 09:30


 2/15/17 09:29  1/21/17 12:23


 


 


 Tamsulosin HCl


  (Flomax)  0.4 mg  BID


 ORAL


   1/7/17 11:30


 2/6/17 11:29  1/21/17 11:00


 


 


 Zolpidem Tartrate


  (Ambien)  5 mg  HSPRN  PRN


 ORAL


 Insomnia  1/2/17 23:45


 2/1/17 23:44   


 

















SKYLAR RICH M.D. Jan 21, 2017 14:22

## 2017-01-22 VITALS — DIASTOLIC BLOOD PRESSURE: 88 MMHG | SYSTOLIC BLOOD PRESSURE: 158 MMHG

## 2017-01-22 VITALS — DIASTOLIC BLOOD PRESSURE: 78 MMHG | SYSTOLIC BLOOD PRESSURE: 133 MMHG

## 2017-01-22 VITALS — SYSTOLIC BLOOD PRESSURE: 143 MMHG | DIASTOLIC BLOOD PRESSURE: 78 MMHG

## 2017-01-22 VITALS — DIASTOLIC BLOOD PRESSURE: 83 MMHG | SYSTOLIC BLOOD PRESSURE: 151 MMHG

## 2017-01-22 LAB
ALBUMIN/GLOB SERPL: 0.7 {RATIO} (ref 1–2.7)
ALT SERPL-CCNC: 9 U/L (ref 3–41)
ANION GAP SERPL CALC-SCNC: 15 MMOL/L (ref 5–15)
AST SERPL-CCNC: 20 U/L (ref 5–40)
BASOPHILS NFR BLD AUTO: 1.5 % (ref 0–2)
CALCIUM SERPL-MCNC: 8.5 MG/DL (ref 8.6–10.2)
CHLORIDE SERPL-SCNC: 97 MEQ/L (ref 98–107)
CO2 SERPL-SCNC: 27 MEQ/L (ref 20–30)
CREAT SERPL-MCNC: 3.8 MG/DL (ref 0.7–1.2)
CRP SERPL-MCNC: 2.5 MG/DL (ref ?–0.5)
EOSINOPHIL NFR BLD AUTO: 1.7 % (ref 0–3)
ERYTHROCYTE [DISTWIDTH] IN BLOOD BY AUTOMATED COUNT: 11.7 % (ref 11.6–14.8)
GFR SERPLBLD BASED ON 1.73 SQ M-ARVRAT: 20.8 ML/MIN (ref 60–?)
GLOBULIN SER-MCNC: 3.8 G/DL
HEMOLYSIS: 2
LYMPHOCYTES NFR BLD AUTO: 14.4 % (ref 20–45)
MAGNESIUM SERPL-MCNC: 1.6 MG/DL (ref 1.7–2.5)
MCH RBC QN AUTO: 29.2 PG (ref 27–31)
MCHC RBC AUTO-ENTMCNC: 32.1 G/DL (ref 32–36)
MCV RBC AUTO: 91 FL (ref 80–99)
MONOCYTES NFR BLD AUTO: 7.6 % (ref 1–10)
NEUTROPHILS NFR BLD AUTO: 74.8 % (ref 45–75)
PHOSPHATE SERPL-MCNC: 3.8 MG/DL (ref 2.5–4.8)
PLATELET # BLD: 259 K/UL (ref 150–450)
PMV BLD AUTO: 5.6 FL (ref 6.5–10.1)
POTASSIUM SERPL-SCNC: 3.5 MEQ/L (ref 3.4–4.9)
PROT SERPL-MCNC: 6.7 G/DL (ref 6.6–8.7)
RBC # BLD AUTO: 3.23 M/UL (ref 4.7–6.1)
SODIUM SERPL-SCNC: 139 MEQ/L (ref 135–145)
URATE SERPL-MCNC: 3.4 MG/DL (ref 3–7.5)
WBC # BLD AUTO: 8.7 K/UL (ref 4.8–10.8)

## 2017-01-22 RX ADMIN — HEPARIN SODIUM SCH UNITS: 5000 INJECTION INTRAVENOUS; SUBCUTANEOUS at 21:02

## 2017-01-22 RX ADMIN — HEPARIN SODIUM SCH UNITS: 5000 INJECTION INTRAVENOUS; SUBCUTANEOUS at 20:28

## 2017-01-22 RX ADMIN — HEPARIN PRN UNIT: 100 SYRINGE at 01:37

## 2017-01-22 RX ADMIN — TAMSULOSIN HYDROCHLORIDE SCH MG: 0.4 CAPSULE ORAL at 11:56

## 2017-01-22 RX ADMIN — HEPARIN SODIUM SCH UNITS: 5000 INJECTION INTRAVENOUS; SUBCUTANEOUS at 09:00

## 2017-01-22 RX ADMIN — HYDROCODONE BITARTRATE AND ACETAMINOPHEN PRN EA: 10; 325 TABLET ORAL at 21:05

## 2017-01-22 RX ADMIN — DOCUSATE SODIUM SCH MG: 100 CAPSULE, LIQUID FILLED ORAL at 12:24

## 2017-01-22 RX ADMIN — DOCUSATE SODIUM SCH MG: 100 CAPSULE, LIQUID FILLED ORAL at 19:02

## 2017-01-22 RX ADMIN — TAMSULOSIN HYDROCHLORIDE SCH MG: 0.4 CAPSULE ORAL at 19:02

## 2017-01-22 RX ADMIN — DOCUSATE SODIUM SCH MG: 100 CAPSULE, LIQUID FILLED ORAL at 11:57

## 2017-01-22 RX ADMIN — HYDROCODONE BITARTRATE AND ACETAMINOPHEN PRN EA: 10; 325 TABLET ORAL at 12:44

## 2017-01-22 NOTE — GENERAL PROGRESS NOTE
Assessment/Plan


Status:  stable


Assessment/Plan





Status:





Cellulitis left leg- Sepsis, received Amikacin and Vanco in the past.


Acute renal failure- 


HypoAlbuminemia , Proteinuria , ? NS


Multi drug abuse








Plan:





 had the catheter changed and dialysed -


last dialysis 1/19 next 1/21


On Procardiaxl BID


clonidin PRN


monitor renal parameters, and dialyse as needed














On Renagel-


On  flomax-





Urine studies- Urine Tox screen- MJ and Amphetamines


Monitor renal parameters- 


DC planning...


Per orders





Subjective


ROS Limited/Unobtainable:  No


Constitutional:  Reports: malaise


Allergies:  


Coded Allergies:  


     SULFUR (Unverified  Allergy, Unknown, unk, 1/7/17)


 Per patient allergic to sulfur





Objective





Last 24 Hour Vital Signs








  Date Time  Temp Pulse Resp B/P Pulse Ox O2 Delivery O2 Flow Rate FiO2


 


1/22/17 04:00 98.2 73 20 133/78 100 Room Air  


 


1/22/17 00:00 97.9 79 18 151/83 93 Room Air  


 


1/21/17 20:00 98.2 78 22 129/83 91 Room Air  


 


1/21/17 19:00      Room Air  


 


1/21/17 19:00     94 Room Air  21


 


1/21/17 15:34 97.9       


 


1/21/17 15:28 97.9 84 16 134/96 98 Room Air  


 


1/21/17 11:51 97.9 76 16 142/85  Room Air  


 


1/21/17 11:24      Room Air  

















Intake and Output  


 


 1/21/17 1/22/17





 18:59 06:59


 


Intake Total 1500 ml 


 


Output Total 2600 ml 1500 ml


 


Balance -1100 ml -1500 ml


 


  


 


Intake Oral 1500 ml 


 


Output Urine Total 1400 ml 1500 ml


 


Hemodialysis UF 1200 ml 








Height (Feet):  6


Height (Inches):  1.00


Weight (Pounds):  200


General Appearance:  no apparent distress


Objective


no change in PE











ALISON ROTHMAN Jan 22, 2017 11:17

## 2017-01-22 NOTE — PULMONOLOGY PROGRESS NOTE
Assessment/Plan


Assessment/Plan


ASSESSMENT


sepsis


cellulitis LLE 


pain LLE 


anemia 


ATN on chronic renal disease 


started on HD  


HTN


moderate pulmonary HTN 


polysubstance abuse  





PLAN OF CARE  


MS floor  


s/p abx Rx, ID follows , blood  and urine cx   negative , 


MRI  tibia/fibula - no evidence of OM or abscess, no evidence of fasciitis or 

myositis, 


surgery  seen and evaluated 


no need for surgical intervention  


venous Duplex BLE negative  


pain management  


started on HD 


s/p Brain placement 1/9 and subsequent exchange to permacath 1/16


nephro follows


monitor renal parameters and lytes, 


avoid nephrotoxic 


renal US  - no hydro, echogenic kidneys, c/w medical renal disease 


urine tox screen + marijuana, amphetamines, opiates  


ECHO   with preserved EF and RVSP of 47 c/w moderate pulmonary HTN  


BP management with CCB


DVT prophylaxis


PT/OT 


bowel regimen  


dc plan need outpt HD arrangement  awaiting for insurance 





case discussed and evaluated by supervising physician





Subjective


Allergies:  


Coded Allergies:  


     SULFUR (Unverified  Allergy, Unknown, unk, 1/7/17)


 Per patient allergic to sulfur


Subjective


afebrile, leukocytosis resolved 


on HD currently





Objective





Last 24 Hour Vital Signs








  Date Time  Temp Pulse Resp B/P Pulse Ox O2 Delivery O2 Flow Rate FiO2


 


1/22/17 04:00 98.2 73 20 133/78 100 Room Air  


 


1/22/17 00:00 97.9 79 18 151/83 93 Room Air  


 


1/21/17 20:00 98.2 78 22 129/83 91 Room Air  


 


1/21/17 19:00      Room Air  


 


1/21/17 19:00     94 Room Air  21


 


1/21/17 15:34 97.9       


 


1/21/17 15:28 97.9 84 16 134/96 98 Room Air  


 


1/21/17 11:51 97.9 76 16 142/85  Room Air  

















Intake and Output  


 


 1/21/17 1/22/17





 19:00 07:00


 


Intake Total 1500 ml 


 


Output Total 2600 ml 1500 ml


 


Balance -1100 ml -1500 ml


 


  


 


Intake Oral 1500 ml 


 


Output Urine Total 1400 ml 1500 ml


 


Hemodialysis UF 1200 ml 








Objective


General: NAD, A/A/O x 3 male 


HEENT: NC/AT, EOMI PERRLA, OP moist


Respiratory/Chest:  chest wall non-tender, lungs clear, no respiratory distress

, no accessory muscle use, RIJ HD catheter, intact 


Cardiovascular:  normal peripheral pulses, normal rate, regular rhythm


Abdomen:  normal bowel sounds, soft, non tender, non distended


Extremities:  no cyanosis, no clubbing,  left leg edema and erythema improved. 

  


Skin: left leg edema and erythema improved.   multiple all over the body tattoos


Neurologic/Psychiatric:  CNs II-XII grossly normal, no motor/sensory deficits, 

alert, oriented x 3, responsive


Lymphatic:  no neck adenopathy


Musculoskeletal:  normal muscle bulk





Current Medications








 Medications


  (Trade)  Dose


 Ordered  Sig/Kathleen


 Route


 PRN Reason  Start Time


 Stop Time Status Last Admin


Dose Admin


 


 Acetaminophen


  (Tylenol)  650 mg  Q4H  PRN


 ORAL


 fever  1/2/17 23:45


 2/1/17 23:44  1/7/17 02:46


 


 


 Acetaminophen/


 Hydrocodone Bitart


  (Norco 10/325)  1 ea  Q4H  PRN


 ORAL


 For MODERATE Pain  1/18/17 15:03


 1/25/17 11:29  1/21/17 14:35


 


 


 Clonidine HCl


  (Catapres)  0.1 mg  Q4H  PRN


 ORAL


 bp over 170 syst  1/19/17 11:15


 2/18/17 11:14   


 


 


 Dextrose


  (Dextrose 50%)    STAT  PRN


 IV


 Hypoglycemia  1/2/17 23:45


 2/1/17 23:44   


 


 


 Docusate Sodium


  (Colace)  100 mg  THREE TIMES A  DAY


 ORAL


   1/13/17 13:00


 2/12/17 12:59  1/21/17 12:22


 


 


 Heparin Sodium


  (Beef Lung)


  (Hep-Lock)  200 unit  Q8HR  PRN


 INJ


 for line flush  1/21/17 23:30


 1/26/17 23:29  1/22/17 01:37


 


 


 Heparin Sodium


  (Porcine)


  (Heparin 5000


 units/ml)  5,000 units  EVERY 12  HOURS


 SUBQ


   1/3/17 09:00


 2/2/17 08:59  1/21/17 08:12


 


 


 Morphine Sulfate


  (Morphine


 Sulfate)  2 mg  Q4H  PRN


 IVP


 PAIN UNRELIEVED BY NORCO  1/18/17 17:15


 1/25/17 17:14   


 


 


 Nifedipine


  (Procardia XL)  60 mg  BID


 ORAL


   1/19/17 12:00


 2/18/17 11:59  1/21/17 11:01


 


 


 Ondansetron HCl


  (Zofran)  4 mg  Q6H  PRN


 IVP


 Nausea & Vomiting  1/2/17 23:45


 2/1/17 23:44   


 


 


 Polyethylene


 Glycol


  (Miralax)  17 gm  HSPRN  PRN


 ORAL


 Constipation  1/2/17 23:45


 2/1/17 23:44   


 


 


 Quetiapine


 Fumarate


  (SEROquel)  25 mg  Q12HR


 ORAL


   1/15/17 11:00


 2/14/17 10:59  1/16/17 08:46


 


 


 Sevelamer


 Carbonate


  (Renvela)  2,400 mg  THREE TIMES A  DAY


 ORAL


   1/16/17 09:30


 2/15/17 09:29  1/21/17 12:23


 


 


 Tamsulosin HCl


  (Flomax)  0.4 mg  BID


 ORAL


   1/7/17 11:30


 2/6/17 11:29  1/21/17 11:00


 


 


 Zolpidem Tartrate


  (Ambien)  5 mg  HSPRN  PRN


 ORAL


 Insomnia  1/2/17 23:45


 2/1/17 23:44   


 

















Ruben (Becca)Margarette NP Jan 22, 2017 11:41

## 2017-01-23 VITALS — SYSTOLIC BLOOD PRESSURE: 119 MMHG | DIASTOLIC BLOOD PRESSURE: 78 MMHG

## 2017-01-23 VITALS — DIASTOLIC BLOOD PRESSURE: 71 MMHG | SYSTOLIC BLOOD PRESSURE: 133 MMHG

## 2017-01-23 VITALS — SYSTOLIC BLOOD PRESSURE: 175 MMHG | DIASTOLIC BLOOD PRESSURE: 95 MMHG

## 2017-01-23 VITALS — DIASTOLIC BLOOD PRESSURE: 64 MMHG | SYSTOLIC BLOOD PRESSURE: 141 MMHG

## 2017-01-23 VITALS — DIASTOLIC BLOOD PRESSURE: 79 MMHG | SYSTOLIC BLOOD PRESSURE: 126 MMHG

## 2017-01-23 VITALS — SYSTOLIC BLOOD PRESSURE: 114 MMHG | DIASTOLIC BLOOD PRESSURE: 68 MMHG

## 2017-01-23 LAB
ALBUMIN/GLOB SERPL: 0.7 {RATIO} (ref 1–2.7)
ALT SERPL-CCNC: 12 U/L (ref 3–41)
ANION GAP SERPL CALC-SCNC: 14 MMOL/L (ref 5–15)
AST SERPL-CCNC: 22 U/L (ref 5–40)
CALCIUM SERPL-MCNC: 8.2 MG/DL (ref 8.6–10.2)
CHLORIDE SERPL-SCNC: 103 MEQ/L (ref 98–107)
CO2 SERPL-SCNC: 26 MEQ/L (ref 20–30)
CREAT SERPL-MCNC: 3.7 MG/DL (ref 0.7–1.2)
GFR SERPLBLD BASED ON 1.73 SQ M-ARVRAT: 21.6 ML/MIN (ref 60–?)
GLOBULIN SER-MCNC: 3.5 G/DL
HEMOLYSIS: 49
MAGNESIUM SERPL-MCNC: 1.6 MG/DL (ref 1.7–2.5)
PHOSPHATE SERPL-MCNC: 4.2 MG/DL (ref 2.5–4.8)
POTASSIUM SERPL-SCNC: 3.7 MEQ/L (ref 3.4–4.9)
PROT SERPL-MCNC: 6.1 G/DL (ref 6.6–8.7)
SODIUM SERPL-SCNC: 143 MEQ/L (ref 135–145)

## 2017-01-23 RX ADMIN — HEPARIN SODIUM SCH UNITS: 5000 INJECTION INTRAVENOUS; SUBCUTANEOUS at 09:00

## 2017-01-23 RX ADMIN — DOCUSATE SODIUM SCH MG: 100 CAPSULE, LIQUID FILLED ORAL at 13:34

## 2017-01-23 RX ADMIN — DOCUSATE SODIUM SCH MG: 100 CAPSULE, LIQUID FILLED ORAL at 09:30

## 2017-01-23 RX ADMIN — HYDROCODONE BITARTRATE AND ACETAMINOPHEN PRN EA: 10; 325 TABLET ORAL at 09:29

## 2017-01-23 RX ADMIN — DOCUSATE SODIUM SCH MG: 100 CAPSULE, LIQUID FILLED ORAL at 17:09

## 2017-01-23 RX ADMIN — HYDROCODONE BITARTRATE AND ACETAMINOPHEN PRN EA: 10; 325 TABLET ORAL at 13:34

## 2017-01-23 RX ADMIN — HEPARIN SODIUM SCH UNITS: 5000 INJECTION INTRAVENOUS; SUBCUTANEOUS at 09:35

## 2017-01-23 RX ADMIN — TAMSULOSIN HYDROCHLORIDE SCH MG: 0.4 CAPSULE ORAL at 17:09

## 2017-01-23 RX ADMIN — TAMSULOSIN HYDROCHLORIDE SCH MG: 0.4 CAPSULE ORAL at 09:29

## 2017-01-23 RX ADMIN — HYDROCODONE BITARTRATE AND ACETAMINOPHEN PRN EA: 10; 325 TABLET ORAL at 04:59

## 2017-01-23 RX ADMIN — HEPARIN PRN UNIT: 100 SYRINGE at 05:13

## 2017-01-23 RX ADMIN — HEPARIN SODIUM SCH UNITS: 5000 INJECTION INTRAVENOUS; SUBCUTANEOUS at 21:00

## 2017-01-23 NOTE — GENERAL PROGRESS NOTE
Assessment/Plan


Status:  stable


Status Narrative


Cr stablizing


Assessment/Plan





Status:





Cellulitis left leg- Sepsis, received Amikacin and Vanco in the past.


Acute renal failure- appears resolving-


HypoAlbuminemia , Proteinuria , ? NS


Multi drug abuse








Plan:





 had the catheter changed and dialysed -


last dialysis  1/21 , watch renal parameters


On Procardiaxl BID


clonidin PRN


monitor renal parameters, and dialyse as needed














On Renagel-


On  flomax-





Urine studies- Urine Tox screen- MJ and Amphetamines


Monitor renal parameters- 


DC planning...


Per orders





Subjective


ROS Limited/Unobtainable:  No


Allergies:  


Coded Allergies:  


     SULFUR (Unverified  Allergy, Unknown, unk, 1/7/17)


 Per patient allergic to sulfur





Objective





Last 24 Hour Vital Signs








  Date Time  Temp Pulse Resp B/P Pulse Ox O2 Delivery O2 Flow Rate FiO2


 


1/23/17 09:28  79  126/79    


 


1/23/17 08:15 97.6 77 21 133/71 97 Room Air  


 


1/23/17 04:00 97.6 79 20 126/79 98 Room Air  


 


1/23/17 00:00 97.0 77 20 119/78 98 Room Air  


 


1/22/17 22:04 98.1       


 


1/22/17 20:00 98.1 84 20 143/78 98 Room Air  


 


1/22/17 19:02  89  158/88    


 


1/22/17 12:17 97.9 89 16 158/88 98 Room Air  


 


1/22/17 11:56  73  133/78    

















Intake and Output  


 


 1/22/17 1/23/17





 19:00 07:00


 


Intake Total 2000 ml 360 ml


 


Output Total 500 ml 950 ml


 


Balance 1500 ml -590 ml


 


  


 


Intake Oral 2000 ml 360 ml


 


Output Urine Total 500 ml 950 ml


 


# Voids 2 


 


# Bowel Movements 1 








Laboratory Tests


1/22/17 14:10: 


White Blood Count 8.7#, Red Blood Count 3.23L, Hemoglobin 9.4#L, Hematocrit 29.4

#L, Mean Corpuscular Volume 91, Mean Corpuscular Hemoglobin 29.2, Mean 

Corpuscular Hemoglobin Concent 32.1, Red Cell Distribution Width 11.7, Platelet 

Count 259#, Mean Platelet Volume 5.6L, Neutrophils (%) (Auto) 74.8, Lymphocytes 

(%) (Auto) 14.4L, Monocytes (%) (Auto) 7.6, Eosinophils (%) (Auto) 1.7, 

Basophils (%) (Auto) 1.5, Sodium Level 139, Potassium Level 3.5, Chloride Level 

97L, Carbon Dioxide Level 27, Anion Gap 15, Blood Urea Nitrogen 18, Creatinine 

3.8H, Estimat Glomerular Filtration Rate 20.8, Glucose Level 152H, Uric Acid 3.4

, Calcium Level 8.5L, Phosphorus Level 3.8, Magnesium Level 1.6L, Total 

Bilirubin 0.3, Aspartate Amino Transf (AST/SGOT) 20, Alanine Aminotransferase (

ALT/SGPT) 9, Alkaline Phosphatase 56, C-Reactive Protein, Quantitative 2.5H, 

Total Protein 6.7, Albumin 2.9L, Globulin 3.8, Albumin/Globulin Ratio 0.7L


1/23/17 05:15: 


Sodium Level 143, Potassium Level 3.7, Chloride Level 103, Carbon Dioxide Level 

26, Anion Gap 14, Blood Urea Nitrogen 17, Creatinine 3.7H, Estimat Glomerular 

Filtration Rate 21.6, Glucose Level 89, Calcium Level 8.2L, Phosphorus Level 4.2

, Magnesium Level 1.6L, Total Bilirubin 0.4, Aspartate Amino Transf (AST/SGOT) 

22, Alanine Aminotransferase (ALT/SGPT) 12, Alkaline Phosphatase 51, Total 

Protein 6.1L, Albumin 2.6L, Globulin 3.5, Albumin/Globulin Ratio 0.7L


Height (Feet):  6


Height (Inches):  1.00


Weight (Pounds):  200


General Appearance:  no apparent distress


Objective


no change in PE











ALISON ROTHMAN Jan 23, 2017 10:07

## 2017-01-23 NOTE — INFECTIOUS DISEASES PROG NOTE
Assessment/Plan


Assessment/Plan





ASSESSMENT:   46-year-old male with:





Left lower extremity cellulitis - improved SP Rx


   MRI : no evidence of associated abscess, fasciitis or osteomyelitis


   Doppler : No DVT 


Fever - resolved 


Leucocytosis - resolved 








ARF --> HD SP rupinder 1/9, exchanged 1/16 


   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 Medical renal disease suspected.


Polysubstance abuse - UDS(+) Amphetamines, opiates, marijuana, tobacco


Sulfa allergy


Full Code








PLAN:





ok to DC off of ABX from ID standpoint


  ( 1/19 SP PO doxycycline d# 7 )


  ( 1/14 SP Ceftaroline  d# 9 )


  ( 1/5 SP on  cefepime and  vancomycin d# 4 ) 


Monitor CBC, temperatures


Monitor BMP.


HD prn





Subjective


Constitutional:  Denies: anorexia, chills, drenching sweats, fatigue, fever, no 

symptoms, other


Allergies:  


Coded Allergies:  


     SULFUR (Unverified  Allergy, Unknown, unk, 1/7/17)


 Per patient allergic to sulfur





Objective


Vital Signs





Last 24 Hour Vital Signs








  Date Time  Temp Pulse Resp B/P Pulse Ox O2 Delivery O2 Flow Rate FiO2


 


1/23/17 00:00 97.0 77 20 119/78 98 Room Air  


 


1/22/17 22:04 98.1       


 


1/22/17 20:00 98.1 84 20 143/78 98 Room Air  


 


1/22/17 19:02  89  158/88    


 


1/22/17 12:17 97.9 89 16 158/88 98 Room Air  


 


1/22/17 11:56  73  133/78    








Height (Feet):  6


Height (Inches):  1.00


Weight (Pounds):  200


HEENT:  atraumatic


Respiratory/Chest:  lungs clear


Cardiovascular:  normal rate


Abdomen:  no organomegaly





Laboratory Tests








Test


  1/22/17


14:10 1/23/17


05:15


 


White Blood Count


  8.7 K/UL


(4.8-10.8)  # 


 


 


Red Blood Count


  3.23 M/UL


(4.70-6.10)  L 


 


 


Hemoglobin


  9.4 G/DL


(14.2-18.0)  #L 


 


 


Hematocrit


  29.4 %


(42.0-52.0)  #L 


 


 


Mean Corpuscular Volume 91 FL (80-99)   


 


Mean Corpuscular Hemoglobin


  29.2 PG


(27.0-31.0) 


 


 


Mean Corpuscular Hemoglobin


Concent 32.1 G/DL


(32.0-36.0) 


 


 


Red Cell Distribution Width


  11.7 %


(11.6-14.8) 


 


 


Platelet Count


  259 K/UL


(150-450)  # 


 


 


Mean Platelet Volume


  5.6 FL


(6.5-10.1)  L 


 


 


Neutrophils (%) (Auto)


  74.8 %


(45.0-75.0) 


 


 


Lymphocytes (%) (Auto)


  14.4 %


(20.0-45.0)  L 


 


 


Monocytes (%) (Auto)


  7.6 %


(1.0-10.0) 


 


 


Eosinophils (%) (Auto)


  1.7 %


(0.0-3.0) 


 


 


Basophils (%) (Auto)


  1.5 %


(0.0-2.0) 


 


 


Sodium Level


  139 mEQ/L


(135-145) 143 mEQ/L


(135-145)


 


Potassium Level


  3.5 mEQ/L


(3.4-4.9) 3.7 mEQ/L


(3.4-4.9)


 


Chloride Level


  97 mEQ/L


()  L 103 mEQ/L


()


 


Carbon Dioxide Level


  27 mEQ/L


(20-30) 26 mEQ/L


(20-30)


 


Anion Gap 15 (5-15)   14 (5-15)  


 


Blood Urea Nitrogen


  18 mg/dL


(7-23) 17 mg/dL


(7-23)


 


Creatinine


  3.8 mg/dL


(0.7-1.2)  H 3.7 mg/dL


(0.7-1.2)  H


 


Estimat Glomerular Filtration


Rate 20.8 mL/min


(>60) 21.6 mL/min


(>60)


 


Glucose Level


  152 mg/dL


()  H 89 mg/dL


()


 


Uric Acid


  3.4 mg/dL


(3.0-7.5) 


 


 


Calcium Level


  8.5 mg/dL


(8.6-10.2)  L 8.2 mg/dL


(8.6-10.2)  L


 


Phosphorus Level


  3.8 mg/dL


(2.5-4.8) 4.2 mg/dL


(2.5-4.8)


 


Magnesium Level


  1.6 mg/dL


(1.7-2.5)  L 1.6 mg/dL


(1.7-2.5)  L


 


Total Bilirubin


  0.3 mg/dL


(0.0-1.2) 0.4 mg/dL


(0.0-1.2)


 


Aspartate Amino Transf


(AST/SGOT) 20 U/L (5-40)  


  22 U/L (5-40)  


 


 


Alanine Aminotransferase


(ALT/SGPT) 9 U/L (3-41)  


  12 U/L (3-41)  


 


 


Alkaline Phosphatase


  56 U/L


() 51 U/L


()


 


C-Reactive Protein,


Quantitative 2.5 mg/dL (<


0.5)  H 


 


 


Total Protein


  6.7 g/dL


(6.6-8.7) 6.1 g/dL


(6.6-8.7)  L


 


Albumin


  2.9 g/dL


(3.5-5.2)  L 2.6 g/dL


(3.5-5.2)  L


 


Globulin 3.8 g/dL   3.5 g/dL  


 


Albumin/Globulin Ratio


  0.7 (1.0-2.7)


L 0.7 (1.0-2.7)


L











Current Medications








 Medications


  (Trade)  Dose


 Ordered  Sig/Kathleen


 Route


 PRN Reason  Start Time


 Stop Time Status Last Admin


Dose Admin


 


 Acetaminophen


  (Tylenol)  650 mg  Q4H  PRN


 ORAL


 fever  1/2/17 23:45


 2/1/17 23:44  1/7/17 02:46


 


 


 Acetaminophen/


 Hydrocodone Bitart


  (Norco 10/325)  1 ea  Q4H  PRN


 ORAL


 For MODERATE Pain  1/18/17 15:03


 1/25/17 11:29  1/23/17 04:59


 


 


 Clonidine HCl


  (Catapres)  0.1 mg  Q4H  PRN


 ORAL


 bp over 170 syst  1/19/17 11:15


 2/18/17 11:14   


 


 


 Dextrose


  (Dextrose 50%)    STAT  PRN


 IV


 Hypoglycemia  1/2/17 23:45


 2/1/17 23:44   


 


 


 Docusate Sodium


  (Colace)  100 mg  THREE TIMES A  DAY


 ORAL


   1/13/17 13:00


 2/12/17 12:59  1/22/17 19:02


 


 


 Heparin Sodium


  (Beef Lung)


  (Hep-Lock)  200 unit  Q8HR  PRN


 INJ


 for line flush  1/21/17 23:30


 1/26/17 23:29  1/23/17 05:13


 


 


 Heparin Sodium


  (Porcine)


  (Heparin 5000


 units/ml)  5,000 units  EVERY 12  HOURS


 SUBQ


   1/3/17 09:00


 2/2/17 08:59  1/22/17 21:02


 


 


 Morphine Sulfate


  (Morphine


 Sulfate)  2 mg  Q4H  PRN


 IVP


 PAIN UNRELIEVED BY NORCO  1/18/17 17:15


 1/25/17 17:14   


 


 


 Nifedipine


  (Procardia XL)  60 mg  BID


 ORAL


   1/19/17 12:00


 2/18/17 11:59  1/22/17 19:02


 


 


 Ondansetron HCl


  (Zofran)  4 mg  Q6H  PRN


 IVP


 Nausea & Vomiting  1/2/17 23:45


 2/1/17 23:44   


 


 


 Polyethylene


 Glycol


  (Miralax)  17 gm  HSPRN  PRN


 ORAL


 Constipation  1/2/17 23:45


 2/1/17 23:44   


 


 


 Quetiapine


 Fumarate


  (SEROquel)  25 mg  Q12HR


 ORAL


   1/15/17 11:00


 2/14/17 10:59  1/16/17 08:46


 


 


 Sevelamer


 Carbonate


  (Renvela)  2,400 mg  THREE TIMES A  DAY


 ORAL


   1/16/17 09:30


 2/15/17 09:29  1/22/17 19:02


 


 


 Tamsulosin HCl


  (Flomax)  0.4 mg  BID


 ORAL


   1/7/17 11:30


 2/6/17 11:29  1/22/17 19:02


 


 


 Zolpidem Tartrate


  (Ambien)  5 mg  HSPRN  PRN


 ORAL


 Insomnia  1/2/17 23:45


 2/1/17 23:44   


 

















SKYLAR RICH M.D. Jan 23, 2017 07:21

## 2017-01-24 VITALS — DIASTOLIC BLOOD PRESSURE: 74 MMHG | SYSTOLIC BLOOD PRESSURE: 136 MMHG

## 2017-01-24 VITALS — SYSTOLIC BLOOD PRESSURE: 115 MMHG | DIASTOLIC BLOOD PRESSURE: 74 MMHG

## 2017-01-24 VITALS — SYSTOLIC BLOOD PRESSURE: 156 MMHG | DIASTOLIC BLOOD PRESSURE: 85 MMHG

## 2017-01-24 VITALS — DIASTOLIC BLOOD PRESSURE: 80 MMHG | SYSTOLIC BLOOD PRESSURE: 130 MMHG

## 2017-01-24 VITALS — SYSTOLIC BLOOD PRESSURE: 160 MMHG | DIASTOLIC BLOOD PRESSURE: 97 MMHG

## 2017-01-24 VITALS — SYSTOLIC BLOOD PRESSURE: 108 MMHG | DIASTOLIC BLOOD PRESSURE: 90 MMHG

## 2017-01-24 LAB
ALBUMIN/GLOB SERPL: 0.8 {RATIO} (ref 1–2.7)
ALT SERPL-CCNC: 9 U/L (ref 3–41)
ANION GAP SERPL CALC-SCNC: 13 MMOL/L (ref 5–15)
AST SERPL-CCNC: 19 U/L (ref 5–40)
BASOPHILS NFR BLD AUTO: 2.3 % (ref 0–2)
CALCIUM SERPL-MCNC: 8.8 MG/DL (ref 8.6–10.2)
CHLORIDE SERPL-SCNC: 101 MEQ/L (ref 98–107)
CO2 SERPL-SCNC: 28 MEQ/L (ref 20–30)
CREAT SERPL-MCNC: 3.6 MG/DL (ref 0.7–1.2)
EOSINOPHIL NFR BLD AUTO: 5.1 % (ref 0–3)
ERYTHROCYTE [DISTWIDTH] IN BLOOD BY AUTOMATED COUNT: 11.8 % (ref 11.6–14.8)
GFR SERPLBLD BASED ON 1.73 SQ M-ARVRAT: 22.2 ML/MIN (ref 60–?)
GLOBULIN SER-MCNC: 3.6 G/DL
HEMOLYSIS: 4
LYMPHOCYTES NFR BLD AUTO: 29.1 % (ref 20–45)
MCH RBC QN AUTO: 28.8 PG (ref 27–31)
MCHC RBC AUTO-ENTMCNC: 32 G/DL (ref 32–36)
MCV RBC AUTO: 90 FL (ref 80–99)
MONOCYTES NFR BLD AUTO: 9.3 % (ref 1–10)
NEUTROPHILS NFR BLD AUTO: 54.2 % (ref 45–75)
PHOSPHATE SERPL-MCNC: 5.1 MG/DL (ref 2.5–4.8)
PLATELET # BLD: 244 K/UL (ref 150–450)
PMV BLD AUTO: 5.7 FL (ref 6.5–10.1)
POTASSIUM SERPL-SCNC: 3.8 MEQ/L (ref 3.4–4.9)
PROT SERPL-MCNC: 6.7 G/DL (ref 6.6–8.7)
RBC # BLD AUTO: 3.28 M/UL (ref 4.7–6.1)
SODIUM SERPL-SCNC: 142 MEQ/L (ref 135–145)
URATE SERPL-MCNC: 3.6 MG/DL (ref 3–7.5)
WBC # BLD AUTO: 5.9 K/UL (ref 4.8–10.8)

## 2017-01-24 RX ADMIN — DOCUSATE SODIUM SCH MG: 100 CAPSULE, LIQUID FILLED ORAL at 18:00

## 2017-01-24 RX ADMIN — HEPARIN SODIUM SCH UNITS: 5000 INJECTION INTRAVENOUS; SUBCUTANEOUS at 19:41

## 2017-01-24 RX ADMIN — HYDROCODONE BITARTRATE AND ACETAMINOPHEN PRN EA: 10; 325 TABLET ORAL at 15:38

## 2017-01-24 RX ADMIN — HEPARIN SODIUM SCH UNITS: 5000 INJECTION INTRAVENOUS; SUBCUTANEOUS at 09:00

## 2017-01-24 RX ADMIN — TAMSULOSIN HYDROCHLORIDE SCH MG: 0.4 CAPSULE ORAL at 18:00

## 2017-01-24 RX ADMIN — TAMSULOSIN HYDROCHLORIDE SCH MG: 0.4 CAPSULE ORAL at 09:00

## 2017-01-24 RX ADMIN — DOCUSATE SODIUM SCH MG: 100 CAPSULE, LIQUID FILLED ORAL at 09:00

## 2017-01-24 RX ADMIN — DOCUSATE SODIUM SCH MG: 100 CAPSULE, LIQUID FILLED ORAL at 13:00

## 2017-01-24 RX ADMIN — HYDROCODONE BITARTRATE AND ACETAMINOPHEN PRN EA: 10; 325 TABLET ORAL at 19:40

## 2017-01-24 NOTE — GENERAL PROGRESS NOTE
Assessment/Plan


Status:  stable - - Cr unchanged , off HD


Status Narrative


possible recovering from [facility]


Assessment/Plan





Status:





Cellulitis left leg- Sepsis, received Amikacin and Vanco in the past.


Acute renal failure- appears resolving-


HypoAlbuminemia , Proteinuria , ? NS


Multi drug abuse








Plan:





refuses medications !!!!


last dialysis  1/21 , watch renal parameters


On Procardiaxl BID


clonidin PRN for high BP


monitor renal parameters, and dialyse as needed





On Renagel-


On  flomax-





Urine studies- Urine Tox screen- MJ and Amphetamines


Monitor renal parameters- 


DC planning...


Per orders





Subjective


ROS Limited/Unobtainable:  No


Allergies:  


Coded Allergies:  


     SULFUR (Unverified  Allergy, Unknown, unk, 1/7/17)


 Per patient allergic to sulfur





Objective





Last 24 Hour Vital Signs








  Date Time  Temp Pulse Resp B/P Pulse Ox O2 Delivery O2 Flow Rate FiO2


 


1/24/17 09:00  89  166/100    


 


1/24/17 08:00 97.7 89 20 160/97 98 Room Air  


 


1/24/17 04:00 97.9 78 18 108/90 97 Room Air  


 


1/24/17 00:00 98.1 80 18 115/74 96 Room Air  


 


1/23/17 20:00 98.4 94 19 175/95 94 Room Air  


 


1/23/17 17:09  84  141/64    


 


1/23/17 16:00 97.5 84 16 141/64 97 Room Air  


 


1/23/17 14:33 98.0       


 


1/23/17 12:00 98.0 69 19 114/68 95 Room Air  

















Intake and Output  


 


 1/23/17 1/24/17





 19:00 07:00


 


Intake Total 480 ml 280 ml


 


Output Total 1850 ml 2100 ml


 


Balance -1370 ml -1820 ml


 


  


 


Intake Oral 480 ml 280 ml


 


Output Urine Total 1850 ml 2100 ml


 


# Voids 2 3








Laboratory Tests


1/24/17 06:30: 


White Blood Count 5.9, Red Blood Count 3.28L, Hemoglobin 9.4L, Hematocrit 29.5L

, Mean Corpuscular Volume 90, Mean Corpuscular Hemoglobin 28.8, Mean 

Corpuscular Hemoglobin Concent 32.0, Red Cell Distribution Width 11.8, Platelet 

Count 244, Mean Platelet Volume 5.7L, Neutrophils (%) (Auto) 54.2, Lymphocytes (

%) (Auto) 29.1, Monocytes (%) (Auto) 9.3, Eosinophils (%) (Auto) 5.1H, 

Basophils (%) (Auto) 2.3H, Sodium Level 142, Potassium Level 3.8, Chloride 

Level 101, Carbon Dioxide Level 28, Anion Gap 13, Blood Urea Nitrogen 17, 

Creatinine 3.6H, Estimat Glomerular Filtration Rate 22.2, Glucose Level 95, 

Uric Acid 3.6, Calcium Level 8.8, Phosphorus Level 5.1H, Total Bilirubin 0.3, 

Aspartate Amino Transf (AST/SGOT) 19, Alanine Aminotransferase (ALT/SGPT) 9, 

Alkaline Phosphatase 56, Total Protein 6.7, Albumin 3.1L, Globulin 3.6, Albumin/

Globulin Ratio 0.8L


Height (Feet):  6


Height (Inches):  1.00


Weight (Pounds):  200


General Appearance:  no apparent distress


Objective


no change in PE











ALISON ROTHMAN Jan 24, 2017 10:24

## 2017-01-24 NOTE — PULMONOLOGY PROGRESS NOTE
Assessment/Plan


Problems:  


(1) Sepsis


(2) Cellulitis


(3) ATN (acute tubular necrosis)


Assessment/Plan


renal function improving


cellulitis resolved


dc home in am





Subjective


ROS Limited/Unobtainable:  No


Allergies:  


Coded Allergies:  


     SULFUR (Unverified  Allergy, Unknown, unk, 1/7/17)


 Per patient allergic to sulfur





Objective





Last 24 Hour Vital Signs








  Date Time  Temp Pulse Resp B/P Pulse Ox O2 Delivery O2 Flow Rate FiO2


 


1/24/17 20:39 98.2       


 


1/24/17 20:00 97.9 90 18 130/80 96 Room Air  


 


1/24/17 18:00  95  136/74    


 


1/24/17 16:00 98.2 95 18 136/74 98 Room Air  


 


1/24/17 15:39  88  156/85    


 


1/24/17 12:00 98.2 88 20 156/85 97 Room Air  


 


1/24/17 09:00  89  166/100    


 


1/24/17 08:00 97.7 89 20 160/97 98 Room Air  


 


1/24/17 04:00 97.9 78 18 108/90 97 Room Air  


 


1/24/17 00:00 98.1 80 18 115/74 96 Room Air  

















Intake and Output  


 


 1/23/17 1/24/17





 19:00 07:00


 


Intake Total 480 ml 280 ml


 


Output Total 1850 ml 2100 ml


 


Balance -1370 ml -1820 ml


 


  


 


Intake Oral 480 ml 280 ml


 


Output Urine Total 1850 ml 2100 ml


 


# Voids 2 3








HEENT:  normocephalic


Respiratory/Chest:  chest wall non-tender, chest wall tender


Cardiovascular:  normal rate


Abdomen:  normal bowel sounds, no organomegaly


Genitourinary:  normal external genitalia


Neurologic/Psychiatric:  CNs II-XII grossly normal


Laboratory Tests


1/24/17 06:30: 


White Blood Count 5.9, Red Blood Count 3.28L, Hemoglobin 9.4L, Hematocrit 29.5L

, Mean Corpuscular Volume 90, Mean Corpuscular Hemoglobin 28.8, Mean 

Corpuscular Hemoglobin Concent 32.0, Red Cell Distribution Width 11.8, Platelet 

Count 244, Mean Platelet Volume 5.7L, Neutrophils (%) (Auto) 54.2, Lymphocytes (

%) (Auto) 29.1, Monocytes (%) (Auto) 9.3, Eosinophils (%) (Auto) 5.1H, 

Basophils (%) (Auto) 2.3H, Sodium Level 142, Potassium Level 3.8, Chloride 

Level 101, Carbon Dioxide Level 28, Anion Gap 13, Blood Urea Nitrogen 17, 

Creatinine 3.6H, Estimat Glomerular Filtration Rate 22.2, Glucose Level 95, 

Uric Acid 3.6, Calcium Level 8.8, Phosphorus Level 5.1H, Total Bilirubin 0.3, 

Aspartate Amino Transf (AST/SGOT) 19, Alanine Aminotransferase (ALT/SGPT) 9, 

Alkaline Phosphatase 56, Total Protein 6.7, Albumin 3.1L, Globulin 3.6, Albumin/

Globulin Ratio 0.8L





Current Medications








 Medications


  (Trade)  Dose


 Ordered  Sig/Kathleen


 Route


 PRN Reason  Start Time


 Stop Time Status Last Admin


Dose Admin


 


 Acetaminophen


  (Tylenol)  650 mg  Q4H  PRN


 ORAL


 Fever/Headache/Mild Pain  1/24/17 23:45


 2/23/17 23:44   


 


 


 Acetaminophen/


 Hydrocodone Bitart


  (Norco 10/325)  1 ea  Q4H  PRN


 ORAL


 For MODERATE Pain  1/18/17 15:03


 1/25/17 11:29  1/24/17 19:40


 


 


 Clonidine HCl


  (Catapres)  0.1 mg  Q4H  PRN


 ORAL


 bp over 170 syst  1/19/17 11:15


 2/18/17 11:14   


 


 


 Dextrose


  (Dextrose 50%)    STAT  PRN


 IV


 Hypoglycemia  1/2/17 23:45


 2/1/17 23:44   


 


 


 Docusate Sodium


  (Colace)  100 mg  THREE TIMES A  DAY


 ORAL


   1/13/17 13:00


 2/12/17 12:59  1/23/17 17:09


 


 


 Heparin Sodium


  (Beef Lung)


  (Hep-Lock)  200 unit  Q8HR  PRN


 INJ


 for line flush  1/21/17 23:30


 1/26/17 23:29  1/23/17 05:13


 


 


 Heparin Sodium


  (Porcine)


  (Heparin 5000


 units/ml)  5,000 units  EVERY 12  HOURS


 SUBQ


   1/3/17 09:00


 2/2/17 08:59  1/22/17 21:02


 


 


 Lactulose


  (Cephulac)  20 gm  TIDPRN  PRN


 ORAL


 CONSTIPATION  1/24/17 19:42


 2/23/17 19:14   


 


 


 Morphine Sulfate


  (Morphine


 Sulfate)  2 mg  Q4H  PRN


 IVP


 PAIN UNRELIEVED BY NORCO  1/18/17 17:15


 1/25/17 17:14   


 


 


 Nifedipine


  (Procardia XL)  60 mg  BID


 ORAL


   1/19/17 12:00


 2/18/17 11:59  1/24/17 15:39


 


 


 Ondansetron HCl


  (Zofran)  4 mg  Q6H  PRN


 IVP


 Nausea & Vomiting  1/2/17 23:45


 2/1/17 23:44   


 


 


 Quetiapine


 Fumarate


  (SEROquel)  25 mg  Q12HR


 ORAL


   1/15/17 11:00


 2/14/17 10:59  1/23/17 09:30


 


 


 Sevelamer


 Carbonate


  (Renvela)  1,600 mg  THREE TIMES A  DAY


 ORAL


   1/23/17 13:00


 2/22/17 12:59  1/24/17 16:02


 


 


 Tamsulosin HCl


  (Flomax)  0.4 mg  BID


 ORAL


   1/7/17 11:30


 2/6/17 11:29  1/23/17 17:09


 


 


 Zolpidem Tartrate


  (Ambien)  5 mg  HSPRN  PRN


 ORAL


 Insomnia  1/2/17 23:45


 2/1/17 23:44   


 

















GE WIN Jan 24, 2017 23:33

## 2017-01-24 NOTE — INFECTIOUS DISEASES PROG NOTE
Assessment/Plan


Assessment/Plan





ASSESSMENT:   46-year-old male with:





Left lower extremity cellulitis - improved SP Rx


   MRI : no evidence of associated abscess, fasciitis or osteomyelitis


   Doppler : No DVT 


Fever - resolved 


Leucocytosis - resolved 








ARF --> HD SP rupinder 1/9, exchanged 1/16 


   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 Medical renal disease suspected.


Polysubstance abuse - UDS(+) Amphetamines, opiates, marijuana, tobacco


Sulfa allergy


Full Code








PLAN:





ok to DC off of ABX from ID standpoint


  ( 1/19 SP PO doxycycline d# 7 )


  ( 1/14 SP Ceftaroline  d# 9 )


  ( 1/5 SP on  cefepime and  vancomycin d# 4 ) 


Monitor CBC, temperatures


Monitor BMP.


HD prn





Subjective


Allergies:  


Coded Allergies:  


     SULFUR (Unverified  Allergy, Unknown, unk, 1/7/17)


 Per patient allergic to sulfur


Subjective





remains afebrile.  no new complaint


DC planning ongoing





Objective


Vital Signs





Last 24 Hour Vital Signs








  Date Time  Temp Pulse Resp B/P Pulse Ox O2 Delivery O2 Flow Rate FiO2


 


1/24/17 09:00  89  166/100    


 


1/24/17 08:00 97.7 89 20 160/97 98 Room Air  


 


1/24/17 04:00 97.9 78 18 108/90 97 Room Air  


 


1/24/17 00:00 98.1 80 18 115/74 96 Room Air  


 


1/23/17 20:00 98.4 94 19 175/95 94 Room Air  


 


1/23/17 17:09  84  141/64    


 


1/23/17 16:00 97.5 84 16 141/64 97 Room Air  


 


1/23/17 14:33 98.0       


 


1/23/17 12:00 98.0 69 19 114/68 95 Room Air  








Height (Feet):  6


Height (Inches):  1.00


Weight (Pounds):  200


General Appearance:  no acute distress


Respiratory/Chest:  no respiratory distress


Cardiovascular:  normal rate, regular rhythm


Abdomen:  normal bowel sounds, soft, non tender, non distended





Laboratory Tests








Test


  1/24/17


06:30


 


White Blood Count


  5.9 K/UL


(4.8-10.8)


 


Red Blood Count


  3.28 M/UL


(4.70-6.10)  L


 


Hemoglobin


  9.4 G/DL


(14.2-18.0)  L


 


Hematocrit


  29.5 %


(42.0-52.0)  L


 


Mean Corpuscular Volume 90 FL (80-99)  


 


Mean Corpuscular Hemoglobin


  28.8 PG


(27.0-31.0)


 


Mean Corpuscular Hemoglobin


Concent 32.0 G/DL


(32.0-36.0)


 


Red Cell Distribution Width


  11.8 %


(11.6-14.8)


 


Platelet Count


  244 K/UL


(150-450)


 


Mean Platelet Volume


  5.7 FL


(6.5-10.1)  L


 


Neutrophils (%) (Auto)


  54.2 %


(45.0-75.0)


 


Lymphocytes (%) (Auto)


  29.1 %


(20.0-45.0)


 


Monocytes (%) (Auto)


  9.3 %


(1.0-10.0)


 


Eosinophils (%) (Auto)


  5.1 %


(0.0-3.0)  H


 


Basophils (%) (Auto)


  2.3 %


(0.0-2.0)  H


 


Sodium Level


  142 mEQ/L


(135-145)


 


Potassium Level


  3.8 mEQ/L


(3.4-4.9)


 


Chloride Level


  101 mEQ/L


()


 


Carbon Dioxide Level


  28 mEQ/L


(20-30)


 


Anion Gap 13 (5-15)  


 


Blood Urea Nitrogen


  17 mg/dL


(7-23)


 


Creatinine


  3.6 mg/dL


(0.7-1.2)  H


 


Estimat Glomerular Filtration


Rate 22.2 mL/min


(>60)


 


Glucose Level


  95 mg/dL


()


 


Uric Acid


  3.6 mg/dL


(3.0-7.5)


 


Calcium Level


  8.8 mg/dL


(8.6-10.2)


 


Phosphorus Level


  5.1 mg/dL


(2.5-4.8)  H


 


Total Bilirubin


  0.3 mg/dL


(0.0-1.2)


 


Aspartate Amino Transf


(AST/SGOT) 19 U/L (5-40)  


 


 


Alanine Aminotransferase


(ALT/SGPT) 9 U/L (3-41)  


 


 


Alkaline Phosphatase


  56 U/L


()


 


Total Protein


  6.7 g/dL


(6.6-8.7)


 


Albumin


  3.1 g/dL


(3.5-5.2)  L


 


Globulin 3.6 g/dL  


 


Albumin/Globulin Ratio


  0.8 (1.0-2.7)


L











Current Medications








 Medications


  (Trade)  Dose


 Ordered  Sig/Kathleen


 Route


 PRN Reason  Start Time


 Stop Time Status Last Admin


Dose Admin


 


 Acetaminophen


  (Tylenol)  650 mg  Q4H  PRN


 ORAL


 fever  1/2/17 23:45


 2/1/17 23:44  1/7/17 02:46


 


 


 Acetaminophen/


 Hydrocodone Bitart


  (Norco 10/325)  1 ea  Q4H  PRN


 ORAL


 For MODERATE Pain  1/18/17 15:03


 1/25/17 11:29  1/23/17 13:34


 


 


 Clonidine HCl


  (Catapres)  0.1 mg  Q4H  PRN


 ORAL


 bp over 170 syst  1/19/17 11:15


 2/18/17 11:14   


 


 


 Dextrose


  (Dextrose 50%)    STAT  PRN


 IV


 Hypoglycemia  1/2/17 23:45


 2/1/17 23:44   


 


 


 Docusate Sodium


  (Colace)  100 mg  THREE TIMES A  DAY


 ORAL


   1/13/17 13:00


 2/12/17 12:59  1/23/17 17:09


 


 


 Heparin Sodium


  (Beef Lung)


  (Hep-Lock)  200 unit  Q8HR  PRN


 INJ


 for line flush  1/21/17 23:30


 1/26/17 23:29  1/23/17 05:13


 


 


 Heparin Sodium


  (Porcine)


  (Heparin 5000


 units/ml)  5,000 units  EVERY 12  HOURS


 SUBQ


   1/3/17 09:00


 2/2/17 08:59  1/22/17 21:02


 


 


 Morphine Sulfate


  (Morphine


 Sulfate)  2 mg  Q4H  PRN


 IVP


 PAIN UNRELIEVED BY NORCO  1/18/17 17:15


 1/25/17 17:14   


 


 


 Nifedipine


  (Procardia XL)  60 mg  BID


 ORAL


   1/19/17 12:00


 2/18/17 11:59  1/23/17 17:09


 


 


 Ondansetron HCl


  (Zofran)  4 mg  Q6H  PRN


 IVP


 Nausea & Vomiting  1/2/17 23:45


 2/1/17 23:44   


 


 


 Polyethylene


 Glycol


  (Miralax)  17 gm  HSPRN  PRN


 ORAL


 Constipation  1/2/17 23:45


 2/1/17 23:44   


 


 


 Quetiapine


 Fumarate


  (SEROquel)  25 mg  Q12HR


 ORAL


   1/15/17 11:00


 2/14/17 10:59  1/23/17 09:30


 


 


 Sevelamer


 Carbonate


  (Renvela)  1,600 mg  THREE TIMES A  DAY


 ORAL


   1/23/17 13:00


 2/22/17 12:59  1/23/17 17:09


 


 


 Tamsulosin HCl


  (Flomax)  0.4 mg  BID


 ORAL


   1/7/17 11:30


 2/6/17 11:29  1/23/17 17:09


 


 


 Zolpidem Tartrate


  (Ambien)  5 mg  HSPRN  PRN


 ORAL


 Insomnia  1/2/17 23:45


 2/1/17 23:44   


 

















TANIA FRANCIS Jan 24, 2017 09:40

## 2017-01-25 VITALS — SYSTOLIC BLOOD PRESSURE: 121 MMHG | DIASTOLIC BLOOD PRESSURE: 82 MMHG

## 2017-01-25 VITALS — DIASTOLIC BLOOD PRESSURE: 80 MMHG | SYSTOLIC BLOOD PRESSURE: 120 MMHG

## 2017-01-25 VITALS — SYSTOLIC BLOOD PRESSURE: 143 MMHG | DIASTOLIC BLOOD PRESSURE: 79 MMHG

## 2017-01-25 VITALS — DIASTOLIC BLOOD PRESSURE: 75 MMHG | SYSTOLIC BLOOD PRESSURE: 118 MMHG

## 2017-01-25 LAB
ALBUMIN/GLOB SERPL: 0.7 {RATIO} (ref 1–2.7)
ALT SERPL-CCNC: 11 U/L (ref 3–41)
ANION GAP SERPL CALC-SCNC: 13 MMOL/L (ref 5–15)
AST SERPL-CCNC: 19 U/L (ref 5–40)
BASOPHILS NFR BLD AUTO: 1.4 % (ref 0–2)
CALCIUM SERPL-MCNC: 8.5 MG/DL (ref 8.6–10.2)
CHLORIDE SERPL-SCNC: 100 MEQ/L (ref 98–107)
CO2 SERPL-SCNC: 26 MEQ/L (ref 20–30)
CREAT SERPL-MCNC: 3.4 MG/DL (ref 0.7–1.2)
CRP SERPL-MCNC: 1.1 MG/DL (ref ?–0.5)
EOSINOPHIL NFR BLD AUTO: 5.1 % (ref 0–3)
ERYTHROCYTE [DISTWIDTH] IN BLOOD BY AUTOMATED COUNT: 12.1 % (ref 11.6–14.8)
GFR SERPLBLD BASED ON 1.73 SQ M-ARVRAT: 23.8 ML/MIN (ref 60–?)
GLOBULIN SER-MCNC: 3.7 G/DL
HEMOLYSIS: 3
LYMPHOCYTES NFR BLD AUTO: 27.3 % (ref 20–45)
MAGNESIUM SERPL-MCNC: 2 MG/DL (ref 1.7–2.5)
MCH RBC QN AUTO: 28.6 PG (ref 27–31)
MCHC RBC AUTO-ENTMCNC: 32 G/DL (ref 32–36)
MCV RBC AUTO: 89 FL (ref 80–99)
MONOCYTES NFR BLD AUTO: 11.7 % (ref 1–10)
NEUTROPHILS NFR BLD AUTO: 54.5 % (ref 45–75)
PHOSPHATE SERPL-MCNC: 4.3 MG/DL (ref 2.5–4.8)
PLATELET # BLD: 193 K/UL (ref 150–450)
PMV BLD AUTO: 5.7 FL (ref 6.5–10.1)
POTASSIUM SERPL-SCNC: 3.6 MEQ/L (ref 3.4–4.9)
PROT SERPL-MCNC: 6.5 G/DL (ref 6.6–8.7)
RBC # BLD AUTO: 3.14 M/UL (ref 4.7–6.1)
SODIUM SERPL-SCNC: 139 MEQ/L (ref 135–145)
URATE SERPL-MCNC: 3.8 MG/DL (ref 3–7.5)
WBC # BLD AUTO: 6 K/UL (ref 4.8–10.8)

## 2017-01-25 PROCEDURE — 02PY33Z REMOVAL OF INFUSION DEVICE FROM GREAT VESSEL, PERCUTANEOUS APPROACH: ICD-10-PCS

## 2017-01-25 RX ADMIN — DOCUSATE SODIUM SCH MG: 100 CAPSULE, LIQUID FILLED ORAL at 12:32

## 2017-01-25 RX ADMIN — TAMSULOSIN HYDROCHLORIDE SCH MG: 0.4 CAPSULE ORAL at 08:56

## 2017-01-25 RX ADMIN — HYDROCODONE BITARTRATE AND ACETAMINOPHEN PRN EA: 10; 325 TABLET ORAL at 10:09

## 2017-01-25 RX ADMIN — HYDROCODONE BITARTRATE AND ACETAMINOPHEN PRN EA: 10; 325 TABLET ORAL at 00:28

## 2017-01-25 RX ADMIN — HEPARIN SODIUM SCH UNITS: 5000 INJECTION INTRAVENOUS; SUBCUTANEOUS at 09:03

## 2017-01-25 RX ADMIN — HYDROCODONE BITARTRATE AND ACETAMINOPHEN PRN EA: 10; 325 TABLET ORAL at 05:18

## 2017-01-25 RX ADMIN — DOCUSATE SODIUM SCH MG: 100 CAPSULE, LIQUID FILLED ORAL at 08:56

## 2017-01-25 NOTE — GENERAL PROGRESS NOTE
Assessment/Plan


Status:  stable


Status Narrative


Cr lowering


Assessment/Plan





Status:





Cellulitis left leg- Sepsis, received Amikacin and Vanco in the past.


Acute renal failure- appears resolving-


HypoAlbuminemia , Proteinuria , ? NS


Multi drug abuse








Plan:





refuses medications at times !!!!


last dialysis  1/21 , Cr lowering


On Procardiaxl BID


clonidin PRN for high BP


On Renagel-


On  flomax-





Urine studies- Urine Tox screen- MJ and Amphetamines


Monitor renal parameters- 


Agree with DC planning...and out patient follow up with PMD





Subjective


ROS Limited/Unobtainable:  No


Allergies:  


Coded Allergies:  


     SULFUR (Unverified  Allergy, Unknown, unk, 1/7/17)


 Per patient allergic to sulfur





Objective





Last 24 Hour Vital Signs








  Date Time  Temp Pulse Resp B/P Pulse Ox O2 Delivery O2 Flow Rate FiO2


 


1/25/17 08:56  89  120/80    


 


1/25/17 08:10 98.2 89 19 120/80 97 Room Air  


 


1/25/17 04:00 98.2 79 18 121/82 96 Room Air  


 


1/25/17 00:00 97.7 88 18 118/75 97 Room Air  


 


1/24/17 20:39 98.2       


 


1/24/17 20:00 97.9 90 18 130/80 96 Room Air  


 


1/24/17 18:00  95  136/74    


 


1/24/17 16:00 98.2 95 18 136/74 98 Room Air  


 


1/24/17 15:39  88  156/85    


 


1/24/17 12:00 98.2 88 20 156/85 97 Room Air  

















Intake and Output  


 


 1/24/17 1/25/17





 19:00 07:00


 


Intake Total  480 ml


 


Output Total  800 ml


 


Balance  -320 ml


 


  


 


Intake Oral  480 ml


 


Output Urine Total  800 ml


 


# Voids  7











Current Medications








 Medications


  (Trade)  Dose


 Ordered  Sig/Kathleen


 Route


 PRN Reason  Start Time


 Stop Time Status Last Admin


Dose Admin


 


 Acetaminophen


  (Tylenol)  650 mg  Q4H  PRN


 ORAL


 Fever/Headache/Mild Pain  1/24/17 23:45


 2/23/17 23:44   


 


 


 Acetaminophen/


 Hydrocodone Bitart


  (Norco 10/325)  1 ea  Q4H  PRN


 ORAL


 For MODERATE Pain  1/18/17 15:03


 1/25/17 11:29  1/25/17 05:18


 


 


 Clonidine HCl


  (Catapres)  0.1 mg  Q4H  PRN


 ORAL


 bp over 170 syst  1/19/17 11:15


 2/18/17 11:14   


 


 


 Dextrose


  (Dextrose 50%)    STAT  PRN


 IV


 Hypoglycemia  1/2/17 23:45


 2/1/17 23:44   


 


 


 Docusate Sodium


  (Colace)  100 mg  THREE TIMES A  DAY


 ORAL


   1/13/17 13:00


 2/12/17 12:59  1/23/17 17:09


 


 


 Heparin Sodium


  (Beef Lung)


  (Hep-Lock)  200 unit  Q8HR  PRN


 INJ


 for line flush  1/21/17 23:30


 1/26/17 23:29  1/23/17 05:13


 


 


 Heparin Sodium


  (Porcine)


  (Heparin 5000


 units/ml)  5,000 units  EVERY 12  HOURS


 SUBQ


   1/3/17 09:00


 2/2/17 08:59  1/25/17 09:03


 


 


 Lactulose


  (Cephulac)  20 gm  TIDPRN  PRN


 ORAL


 CONSTIPATION  1/24/17 19:42


 2/23/17 19:14  1/24/17 23:35


 


 


 Morphine Sulfate


  (Morphine


 Sulfate)  2 mg  Q4H  PRN


 IVP


 PAIN UNRELIEVED BY NORCO  1/18/17 17:15


 1/25/17 17:14   


 


 


 Nifedipine


  (Procardia XL)  60 mg  BID


 ORAL


   1/19/17 12:00


 2/18/17 11:59  1/25/17 08:56


 


 


 Ondansetron HCl


  (Zofran)  4 mg  Q6H  PRN


 IVP


 Nausea & Vomiting  1/2/17 23:45


 2/1/17 23:44   


 


 


 Quetiapine


 Fumarate


  (SEROquel)  25 mg  Q12HR


 ORAL


   1/15/17 11:00


 2/14/17 10:59  1/23/17 09:30


 


 


 Sevelamer


 Carbonate


  (Renvela)  1,600 mg  THREE TIMES A  DAY


 ORAL


   1/23/17 13:00


 2/22/17 12:59  1/25/17 09:04


 


 


 Tamsulosin HCl


  (Flomax)  0.4 mg  BID


 ORAL


   1/7/17 11:30


 2/6/17 11:29  1/25/17 08:56


 


 


 Zolpidem Tartrate


  (Ambien)  5 mg  HSPRN  PRN


 ORAL


 Insomnia  1/2/17 23:45


 2/1/17 23:44   


 





Laboratory Tests


1/25/17 05:30: 


White Blood Count 6.0, Red Blood Count 3.14L, Hemoglobin 9.0L, Hematocrit 28.1L

, Mean Corpuscular Volume 89, Mean Corpuscular Hemoglobin 28.6, Mean 

Corpuscular Hemoglobin Concent 32.0, Red Cell Distribution Width 12.1, Platelet 

Count 193, Mean Platelet Volume 5.7L, Neutrophils (%) (Auto) 54.5, Lymphocytes (

%) (Auto) 27.3, Monocytes (%) (Auto) 11.7H, Eosinophils (%) (Auto) 5.1H, 

Basophils (%) (Auto) 1.4, Sodium Level 139, Potassium Level 3.6, Chloride Level 

100, Carbon Dioxide Level 26, Anion Gap 13, Blood Urea Nitrogen 20, Creatinine 

3.4H, Estimat Glomerular Filtration Rate 23.8, Glucose Level 88, Uric Acid 3.8, 

Calcium Level 8.5L, Phosphorus Level 4.3, Magnesium Level 2.0, Total Bilirubin 

0.3, Aspartate Amino Transf (AST/SGOT) 19, Alanine Aminotransferase (ALT/SGPT) 

11, Alkaline Phosphatase 52, C-Reactive Protein, Quantitative 1.1H, Pro-B-Type 

Natriuretic Peptide 551H, Total Protein 6.5L, Albumin 2.8L, Globulin 3.7, 

Albumin/Globulin Ratio 0.7L


Height (Feet):  6


Height (Inches):  1.00


Weight (Pounds):  200


General Appearance:  no apparent distress


Objective


no change in PE











ALISON ROTHMAN Jan 25, 2017 09:24

## 2017-01-27 NOTE — DIAGNOSTIC IMAGING REPORT
Indications: PermCath no longer needed



Technique: Sterile prepping and draping right chest. Local anesthesia with 1%

lidocaine. Chest dermatotomy extended. Using blunt dissection, the cuff of the

catheter was freed from the surrounding soft tissues, and the catheter was removed.

No imaging was performed. The patient tolerated the procedure well, without

immediate complication.



Comparison: None



Findings: None



Impression: Successful removal of tunneled dialysis catheter, as noted.

## 2017-01-27 NOTE — DISCHARGE SUMMARY
Discharge Summary


Hospital Course


Date of Admission


Jan 2, 2017 at 23:30


Date of Discharge


Jan 25, 2017 at 14:53


Admitting Diagnosis


cellulitis left leg


HPI


Harley Torres is a 46 year old male who was admitted on Jan 2, 2017 at 23:30 for 

Cellulitis Left Leg


Hospital Course


[id number]





Discharge


Discharge Disposition


Patient was discharged to Recuperative Care


Discharge Diagnoses:  











Nancy Alanis NP Jan 27, 2017 18:19

## 2017-01-28 NOTE — DISCHARGE SUMMARY 2 SIG
DATE OF ADMISSION:  01/02/2017



DATE OF DISCHARGE:  01/25/2017



BRIEF HOSPITAL COURSE:  The patient is a 46-year-old homeless

gentleman who presented to ED after increased left leg pain and swelling.

On evaluation at ED, he was febrile 102.7.  WBC was elevated.  He was

admitted for cellulitis.  Dr. Bell was consulted for antibiotic

management and Dr. Patel for surgical evaluation.  MRI of the distal

fibula showed soft tissue edema confined to the subcutaneous soft tissues

of the left leg with no associated abscess and no evidence of

osteomyelitis.  Dr. Zarco was consulted for evaluation of acute renal

failure.  Ultrasound of the kidney showed echogenic kidneys, normal in

size.  Negative for hydronephrosis.  The patient was provided with wound

care.  No surgical intervention was needed.  Acute renal failure has

progressed.  Dialysis catheter was placed and the patient was started on

hemodialysis.  A 24-hour urine protein was less than 1 gram.  Urine

toxicology was positive for marijuana and amphetamines.  The patient's

stay was complicated as contributed by the patient's noncompliance as the

patient would sometimes refuse medications and blood draws.  Catheter was

clotted and initially refused replacement and a new non-tunneled catheter

was placed on 01/16/2017.  He ventrally had a tunneled catheter placed on

01/18/2017.  However, renal failure was recovering, his leg cellulitis and

leg wound healed.  He was taken off antibiotics.  Renal function improved.

Tunneled catheter was removed.  Social service was consulted to aid in

the patient's placement and the patient was referred to recuperative care.

Advised to follow up with nephrologist.  Van for transportation was

provided and the patient was to be transported via hospital van.  However,

at the day of discharge, the patient took all his belongings and

and walked out of the hospital.



FINAL DIAGNOSES:

1. Left leg cellulitis with sepsis.

2. Acute renal failure.

3. Multidrug abuse.

4. Hypoalbuminemia.

5. Proteinuria.

6. Acute tubular necrosis.

7. Noncompliance as the patient would sometimes refuse blood draw and

treatment.

8. Homelessness.







  ______________________________________________

  Mirali Zarrabi, M.D.



I have been assigned to dictate discharge summary on this account and I

was not involved in the patient's management.



  ______________________________________________

  Nancy Alanis N.P.





DR:  LADARIUS

D:  01/27/2017 18:21

T:  01/27/2017 23:32

JOB#:  [id number]

CC:



ZORA

## 2017-02-01 ENCOUNTER — HOSPITAL ENCOUNTER (EMERGENCY)
Dept: HOSPITAL 72 - EMR | Age: 47
Discharge: HOME | End: 2017-02-01
Payer: MEDICAID

## 2017-02-01 VITALS — WEIGHT: 2 LBS | BODY MASS INDEX: 0.27 KG/M2 | HEIGHT: 73 IN

## 2017-02-01 VITALS — SYSTOLIC BLOOD PRESSURE: 134 MMHG | DIASTOLIC BLOOD PRESSURE: 78 MMHG

## 2017-02-01 VITALS — SYSTOLIC BLOOD PRESSURE: 129 MMHG | DIASTOLIC BLOOD PRESSURE: 81 MMHG

## 2017-02-01 VITALS — DIASTOLIC BLOOD PRESSURE: 81 MMHG | SYSTOLIC BLOOD PRESSURE: 130 MMHG

## 2017-02-01 DIAGNOSIS — N19: ICD-10-CM

## 2017-02-01 DIAGNOSIS — Z76.5: ICD-10-CM

## 2017-02-01 DIAGNOSIS — Z88.2: ICD-10-CM

## 2017-02-01 DIAGNOSIS — R31.29: ICD-10-CM

## 2017-02-01 DIAGNOSIS — R10.9: Primary | ICD-10-CM

## 2017-02-01 DIAGNOSIS — D64.9: ICD-10-CM

## 2017-02-01 DIAGNOSIS — Z87.19: ICD-10-CM

## 2017-02-01 LAB
ALBUMIN/GLOB SERPL: 1 {RATIO} (ref 1–2.7)
ALT SERPL-CCNC: 28 U/L (ref 3–41)
AMYLASE SERPL-CCNC: 170 U/L (ref 10–110)
ANION GAP SERPL CALC-SCNC: 12 MMOL/L (ref 5–15)
APPEARANCE UR: (no result)
APTT BLD: 26 SEC (ref 23–33)
AST SERPL-CCNC: 32 U/L (ref 5–40)
BACTERIA #/AREA URNS HPF: (no result) /HPF
BASOPHILS NFR BLD AUTO: 1 % (ref 0–2)
CALCIUM SERPL-MCNC: 8.5 MG/DL (ref 8.6–10.2)
CHLORIDE SERPL-SCNC: 105 MEQ/L (ref 98–107)
CO2 SERPL-SCNC: 25 MEQ/L (ref 20–30)
CREAT SERPL-MCNC: 1.9 MG/DL (ref 0.7–1.2)
EOSINOPHIL NFR BLD AUTO: 4.8 % (ref 0–3)
ERYTHROCYTE [DISTWIDTH] IN BLOOD BY AUTOMATED COUNT: 13.4 % (ref 11.6–14.8)
GFR SERPLBLD BASED ON 1.73 SQ M-ARVRAT: 46.5 ML/MIN (ref 60–?)
GLOBULIN SER-MCNC: 3 G/DL
HEMOLYSIS: 3
INR PPP: 1 (ref 0.9–1.1)
KETONES UR QL STRIP: NEGATIVE
LEUKOCYTE ESTERASE UR QL STRIP: (no result)
LIPASE SERPL-CCNC: 79 U/L (ref ?–60)
LYMPHOCYTES NFR BLD AUTO: 28.2 % (ref 20–45)
MCH RBC QN AUTO: 29.5 PG (ref 27–31)
MCHC RBC AUTO-ENTMCNC: 32.3 G/DL (ref 32–36)
MCV RBC AUTO: 91 FL (ref 80–99)
MONOCYTES NFR BLD AUTO: 11.9 % (ref 1–10)
NEUTROPHILS NFR BLD AUTO: 54.1 % (ref 45–75)
NITRITE UR QL STRIP: NEGATIVE
PH UR STRIP: 6 [PH] (ref 4.5–8)
PLATELET # BLD: 315 K/UL (ref 150–450)
PMV BLD AUTO: 5.8 FL (ref 6.5–10.1)
POTASSIUM SERPL-SCNC: 4.5 MEQ/L (ref 3.4–4.9)
PROT SERPL-MCNC: 6.1 G/DL (ref 6.6–8.7)
PROT UR QL STRIP: (no result)
PROTHROMBIN TIME: 10.2 SEC (ref 9.3–11.5)
RBC # BLD AUTO: 2.72 M/UL (ref 4.7–6.1)
RBC #/AREA URNS HPF: (no result) /HPF (ref 0–0)
SODIUM SERPL-SCNC: 142 MEQ/L (ref 135–145)
SP GR UR STRIP: 1.01 (ref 1–1.03)
SQUAMOUS #/AREA URNS LPF: (no result) /LPF
UROBILINOGEN UR-MCNC: NORMAL MG/DL (ref 0–1)
WBC # BLD AUTO: 7.6 K/UL (ref 4.8–10.8)
WBC #/AREA URNS HPF: (no result) /HPF (ref 0–0)

## 2017-02-01 PROCEDURE — 93005 ELECTROCARDIOGRAM TRACING: CPT

## 2017-02-01 PROCEDURE — 36415 COLL VENOUS BLD VENIPUNCTURE: CPT

## 2017-02-01 PROCEDURE — 96375 TX/PRO/DX INJ NEW DRUG ADDON: CPT

## 2017-02-01 PROCEDURE — 99284 EMERGENCY DEPT VISIT MOD MDM: CPT

## 2017-02-01 PROCEDURE — 82150 ASSAY OF AMYLASE: CPT

## 2017-02-01 PROCEDURE — 96374 THER/PROPH/DIAG INJ IV PUSH: CPT

## 2017-02-01 PROCEDURE — 85730 THROMBOPLASTIN TIME PARTIAL: CPT

## 2017-02-01 PROCEDURE — 83690 ASSAY OF LIPASE: CPT

## 2017-02-01 PROCEDURE — 80053 COMPREHEN METABOLIC PANEL: CPT

## 2017-02-01 PROCEDURE — 85025 COMPLETE CBC W/AUTO DIFF WBC: CPT

## 2017-02-01 PROCEDURE — 81003 URINALYSIS AUTO W/O SCOPE: CPT

## 2017-02-01 PROCEDURE — 85610 PROTHROMBIN TIME: CPT

## 2017-02-01 NOTE — EMERGENCY ROOM REPORT
History of Present Illness


General


Chief Complaint:  Abdominal Pain


Source:  Patient





Present Illness


HPI


The patient presents with abdominal pain.  He describes the pain as aching, 

more mid abdomen, not radiating.  He states it began the day he was discharged.

  He believes the pain is there because he was not discharged with pain 

medicine.  He states "heavy pain medications" were given in the hospital and he 

feels he needs these.  H/O GERD and stomach problems in the past.  No 

hematemesis or melena.  No fevers.  He feels weak "like a shell".  





He was treated for cellulitis and renal failure and discharged 1/25 with 

improving renal function and not needing dialysis.  He claims his kidney 

function was "normal" at discharge.  He also c/o hematuria and some passage of 

clots, but is able to urinate at this time.  No dysuria.





No URI sy, cough, chest pain, palpitations, HA.  Anxious about living situation 

and wanting to continue at the transitional living situation.  No SI or HI.





H/O anemia.





Denies recent drugs (had + tox at admission).


Allergies:  


Coded Allergies:  


     SULFUR (Unverified  Allergy, Unknown, unk, 1/7/17)


 Per patient allergic to sulfur





Patient History


Past Medical History:  see triage record


Social History:  Reports: drug use, smoking


Social History Narrative


Homeless but in transitional housing


Reviewed Nursing Documentation:  PMH: Agreed, PSxH: Agreed





Nursing Documentation-PM


Past Medical History:  No History, Except For





Review of Systems


All Other Systems:  negative except mentioned in HPI





Physical Exam





Vital Signs








  Date Time  Temp Pulse Resp B/P Pulse Ox O2 Delivery O2 Flow Rate FiO2


 


2/1/17 11:55 98.2 90 16 130/81 98 Room Air  








Sp02 EP Interpretation:  reviewed, normal


General Appearance:  well appearing, no apparent distress, GCS 15


Head:  normocephalic


Eyes:  bilateral eye PERRL, bilateral eye normal inspection


ENT:  moist mucus membranes


Neck:  supple


Respiratory:  lungs clear, normal breath sounds


Cardiovascular #1:  regular rate, rhythm


Cardiovascular #2:  2+ radial (R)


Gastrointestinal:  normal inspection, normal bowel sounds, no mass, non-

distended, no guarding - mid abdomen, soft, no rebound, tenderness


Rectal:  heme negative stool - brown


Musculoskeletal:  back normal, gait/station normal, normal range of motion


Neurologic:  alert, oriented x3, grossly normal


Psychiatric:  other - somewhat pressured


Skin:  normal inspection, warm/dry, other - area where cellulitis was 

completely resolved





Medical Decision Making


Diagnostic Impression:  


 Primary Impression:  


 Abdominal pain


 Qualified Codes:  R10.9 - Unspecified abdominal pain


 Additional Impressions:  


 Resolving renal failure


 Anemia


 Qualified Codes:  D64.9 - Anemia, unspecified


 Microscopic hematuria


 Drug-seeking behavior


ER Course


Patient presents with abdominal pain for 1 week.  DDx: gastritis, PUD, GERD, 

pancreatitis, gall bladder disease amongst others. Also recent h/o renal 

failure.  C/O hematuria also so need to exclude UTI and coagulopathy.  

Treatment with IV hydration, pepcid and toradol.  Based on exam, no imaging 

studies indicated (PA had ordered CT which is not indicated).  If increased WBC

, consider CT.





Labs sign for lower h/h, improved renal function, microscopic hematuria without 

pyuria.





Pain markedly improved.





Discussed anemia and other findings with PMD and renal MD.  Both state 

unwilling to admit as no indication at this time for acute hospitalization.  

They also reported difficulties with patient refusing many treatments in 

hospital unless treated with pain medicine.





No evidence of acute bleeding (microscopic hematuria excluded - present in 

hospital).  H/H was similar 1/16.  Vitals stable.  Pain improved.





Discussed discharge with patient and he stated he wanted to return to 

transitional housing.  He states much of what he feels is that he does not want 

to go to shelters. Called  who evaluated the patient and declined 

to place patient again.  She gave patient referrals but he did not take them.





Patient insisting on getting stronger pain medicines but refuses Motrin ("tears 

up my stomach").  "I want something strong like what I was treated with in the 

hospital."  I discussed my reluctance to do this due to his + tox screen in 

past.  I offered tylenol and he refused to take Rx.  I also advised that he 

needed to start taking a multivitamin with iron.  He refused the prescriptions.

  He states he plans on going to [place] to be evaluated and admitted 

there.





There is no medical emergency at this time.





Patient stable for outpatient observation and treatment.





Laboratory Tests








Test


  2/1/17


12:00 2/1/17


12:15


 


Urine Color Yellow   


 


Urine Appearance


  Slightly


cloudy 


 


 


Urine pH 6 (4.5-8.0)   


 


Urine Specific Gravity


  1.015


(1.005-1.035) 


 


 


Urine Protein


  4+ (NEGATIVE)


H 


 


 


Urine Glucose (UA)


  Negative


(NEGATIVE) 


 


 


Urine Ketones


  Negative


(NEGATIVE) 


 


 


Urine Occult Blood


  5+ (NEGATIVE)


H 


 


 


Urine Nitrite


  Negative


(NEGATIVE) 


 


 


Urine Bilirubin


  Negative


(NEGATIVE) 


 


 


Urine Urobilinogen


  Normal MG/DL


(0.0-1.0) 


 


 


Urine Leukocyte Esterase


  1+ (NEGATIVE)


H 


 


 


Urine RBC


  20-30 /HPF (0


- 0)  H 


 


 


Urine WBC


  2-4 /HPF (0 -


0) 


 


 


Urine Squamous Epithelial


Cells Occasional


/LPF 


 


 


Urine Bacteria


  Few /HPF


(NONE) 


 


 


White Blood Count


  


  7.6 K/UL


(4.8-10.8)


 


Red Blood Count


  


  2.72 M/UL


(4.70-6.10)  L


 


Hemoglobin


  


  8.0 G/DL


(14.2-18.0)  L


 


Hematocrit


  


  24.9 %


(42.0-52.0)  L


 


Mean Corpuscular Volume  91 FL (80-99)  


 


Mean Corpuscular Hemoglobin


  


  29.5 PG


(27.0-31.0)


 


Mean Corpuscular Hemoglobin


Concent 


  32.3 G/DL


(32.0-36.0)


 


Red Cell Distribution Width


  


  13.4 %


(11.6-14.8)


 


Platelet Count


  


  315 K/UL


(150-450)


 


Mean Platelet Volume


  


  5.8 FL


(6.5-10.1)  L


 


Neutrophils (%) (Auto)


  


  54.1 %


(45.0-75.0)


 


Lymphocytes (%) (Auto)


  


  28.2 %


(20.0-45.0)


 


Monocytes (%) (Auto)


  


  11.9 %


(1.0-10.0)  H


 


Eosinophils (%) (Auto)


  


  4.8 %


(0.0-3.0)  H


 


Basophils (%) (Auto)


  


  1.0 %


(0.0-2.0)


 


Prothrombin Time


  


  10.2 SEC


(9.30-11.50)


 


Prothrombin Time INR  1.0 (0.9-1.1)  


 


PTT


  


  26 SEC (23-33)


 


 


Sodium Level


  


  142 mEQ/L


(135-145)


 


Potassium Level


  


  4.5 mEQ/L


(3.4-4.9)


 


Chloride Level


  


  105 mEQ/L


()


 


Carbon Dioxide Level


  


  25 mEQ/L


(20-30)


 


Anion Gap  12 (5-15)  


 


Blood Urea Nitrogen


  


  17 mg/dL


(7-23)


 


Creatinine


  


  1.9 mg/dL


(0.7-1.2)  H


 


Estimate Glomerular


Filtration Rate 


  46.5 mL/min


(>60)


 


Glucose Level


  


  124 mg/dL


()  H


 


Calcium Level


  


  8.5 mg/dL


(8.6-10.2)  L


 


Total Bilirubin


  


  < 0.2 mg/dL


(0.0-1.2)


 


Aspartate Amino Transferase


(AST) 


  32 U/L (5-40)  


 


 


Alanine Aminotransferase (ALT)  28 U/L (3-41)  


 


Alkaline Phosphatase


  


  56 U/L


()


 


Total Protein


  


  6.1 g/dL


(6.6-8.7)  L


 


Albumin


  


  3.1 g/dL


(3.5-5.2)  L


 


Globulin  3.0 g/dL  


 


Albumin/Globulin Ratio  1.0 (1.0-2.7)  


 


Amylase Level


  


  170 U/L


()  H


 


Lipase


  


  79 U/L (< 60)


H








EKG Diagnostic Results


Rate:  normal


Rhythm:  NSR


ST Segments:  no acute changes - j point elevation





Rhythm Strip Diag. Results


EP Interpretation:  yes


Rhythm:  NSR, no PVC's, no ectopy





Last Vital Signs








  Date Time  Temp Pulse Resp B/P Pulse Ox O2 Delivery O2 Flow Rate FiO2


 


2/1/17 16:57 98.0 82 15 129/81 99 Room Air  








Status:  improved


Disposition:  HOME, SELF-CARE


Condition:  Improved


Scripts


Famotidine (PEPCID) 20 Mg Tablet


20 MG ORAL DAILY, #30 TAB 0 Refills


   Prov: Paxton Mejia M.D.         2/1/17 


Acetaminophen (Tylenol) 325 Mg Tablet


650 MG ORAL Q6H Y for Prn Pain/Headache/Temp > 101, #30 TAB 0 Refills


   Prov: Paxton Mejia M.D.         2/1/17


Referrals:  


ALISON ROTHMAN (PCP)











Paxton Mejia M.D. Feb 1, 2017 15:36

## 2017-02-04 NOTE — CARDIOLOGY REPORT
--------------- APPROVED REPORT --------------





EKG Measurement

Heart [id number]

VT [id number]

[id number]

[id number]

[id number]





Normal sinus rhythm

Voltage criteria for left ventricular hypertrophy

ST elevation, probably due to early repolarization

Abnormal ECG

## 2017-02-13 ENCOUNTER — HOSPITAL ENCOUNTER (EMERGENCY)
Dept: HOSPITAL 72 - EMR | Age: 47
Discharge: HOME | End: 2017-02-13
Payer: MEDICAID

## 2017-02-13 VITALS — DIASTOLIC BLOOD PRESSURE: 96 MMHG | SYSTOLIC BLOOD PRESSURE: 155 MMHG

## 2017-02-13 VITALS — BODY MASS INDEX: 26.51 KG/M2 | HEIGHT: 73 IN | WEIGHT: 200 LBS

## 2017-02-13 DIAGNOSIS — N18.6: ICD-10-CM

## 2017-02-13 DIAGNOSIS — R10.84: ICD-10-CM

## 2017-02-13 DIAGNOSIS — I12.0: Primary | ICD-10-CM

## 2017-02-13 DIAGNOSIS — R31.9: ICD-10-CM

## 2017-02-13 DIAGNOSIS — M54.9: ICD-10-CM

## 2017-02-13 DIAGNOSIS — F19.10: ICD-10-CM

## 2017-02-13 DIAGNOSIS — D63.1: ICD-10-CM

## 2017-02-13 LAB
ALBUMIN/GLOB SERPL: 0.9 {RATIO} (ref 1–2.7)
ALT SERPL-CCNC: 8 U/L (ref 3–41)
ANION GAP SERPL CALC-SCNC: 14 MMOL/L (ref 5–15)
APPEARANCE UR: CLEAR
AST SERPL-CCNC: 19 U/L (ref 5–40)
BACTERIA #/AREA URNS HPF: (no result) /HPF
BASOPHILS NFR BLD AUTO: 1.3 % (ref 0–2)
CALCIUM SERPL-MCNC: 8.3 MG/DL (ref 8.6–10.2)
CHLORIDE SERPL-SCNC: 99 MEQ/L (ref 98–107)
CO2 SERPL-SCNC: 27 MEQ/L (ref 20–30)
CREAT SERPL-MCNC: 1.5 MG/DL (ref 0.7–1.2)
EOSINOPHIL NFR BLD AUTO: 5.3 % (ref 0–3)
ERYTHROCYTE [DISTWIDTH] IN BLOOD BY AUTOMATED COUNT: 12.9 % (ref 11.6–14.8)
GFR SERPLBLD BASED ON 1.73 SQ M-ARVRAT: > 60 ML/MIN (ref 60–?)
GLOBULIN SER-MCNC: 3.2 G/DL
HEMOLYSIS: 33
KETONES UR QL STRIP: NEGATIVE
LEUKOCYTE ESTERASE UR QL STRIP: NEGATIVE
LIPASE SERPL-CCNC: 67 U/L (ref ?–60)
LYMPHOCYTES NFR BLD AUTO: 20.7 % (ref 20–45)
MCH RBC QN AUTO: 29.7 PG (ref 27–31)
MCHC RBC AUTO-ENTMCNC: 32.6 G/DL (ref 32–36)
MCV RBC AUTO: 91 FL (ref 80–99)
MONOCYTES NFR BLD AUTO: 12.9 % (ref 1–10)
NEUTROPHILS NFR BLD AUTO: 59.7 % (ref 45–75)
NITRITE UR QL STRIP: NEGATIVE
PH UR STRIP: 7 [PH] (ref 4.5–8)
PLATELET # BLD: 237 K/UL (ref 150–450)
PMV BLD AUTO: 6.5 FL (ref 6.5–10.1)
POTASSIUM SERPL-SCNC: 3.5 MEQ/L (ref 3.4–4.9)
PROT SERPL-MCNC: 6.3 G/DL (ref 6.6–8.7)
PROT UR QL STRIP: (no result)
RBC # BLD AUTO: 3.44 M/UL (ref 4.7–6.1)
RBC #/AREA URNS HPF: (no result) /HPF (ref 0–0)
SODIUM SERPL-SCNC: 140 MEQ/L (ref 135–145)
SP GR UR STRIP: 1 (ref 1–1.03)
SQUAMOUS #/AREA URNS LPF: (no result) /LPF
UROBILINOGEN UR-MCNC: NORMAL MG/DL (ref 0–1)
WBC # BLD AUTO: 6.8 K/UL (ref 4.8–10.8)
WBC #/AREA URNS HPF: (no result) /HPF (ref 0–0)

## 2017-02-13 PROCEDURE — 81003 URINALYSIS AUTO W/O SCOPE: CPT

## 2017-02-13 PROCEDURE — 96360 HYDRATION IV INFUSION INIT: CPT

## 2017-02-13 PROCEDURE — 85025 COMPLETE CBC W/AUTO DIFF WBC: CPT

## 2017-02-13 PROCEDURE — 80053 COMPREHEN METABOLIC PANEL: CPT

## 2017-02-13 PROCEDURE — 36415 COLL VENOUS BLD VENIPUNCTURE: CPT

## 2017-02-13 PROCEDURE — 80300: CPT

## 2017-02-13 PROCEDURE — 74176 CT ABD & PELVIS W/O CONTRAST: CPT

## 2017-02-13 PROCEDURE — 83690 ASSAY OF LIPASE: CPT

## 2017-02-13 NOTE — EMERGENCY ROOM REPORT
History of Present Illness


General


Chief Complaint:  Back Pain-No Injury


Source:  Patient





Present Illness


HPI


This is a 46-year-old male who presents with chief complaint of kidney pain.  

Please note, he had checked in before under different name.  He was admitted 

under Harley Torres, medical record #[id number].  He was made it in January for 

sepsis secondary to cellulitis of the lower extremity.  He had renal failure 

that required dialysis.  He was seen again here in February 1 and blood work 

showed anemia but improving kidney function.  Today he presents with bilateral 

kidney pain.  He said is 7/10.  Ongoing for last 2 days.  Said his urine is 

darker denies any fever chills denies any nausea vomiting.  Similar pain in the 

past.  No other complaint.


Allergies:  


Coded Allergies:  


     SULFA (SULFONAMIDE ANTIBIOTICS) (Verified  Allergy, Unknown, 2/13/17)





Patient History


Past Medical History:  see triage record, old chart reviewed


Past Surgical History:  other


Pertinent Family History:  none


Social History:  Reports: drug use


Immunizations:  other


Reviewed Nursing Documentation:  PMH: Agreed, PSxH: Agreed





Nursing Documentation-PMH


Hx Hypertension:  Yes - ANEMIA


Hx Gastrointestinal Problems:  Yes - KIDNEY FAILURE





Review of Systems


Eye:  Denies: blurred vision, eye pain


ENT:  Denies: ear pain, nose congestion, throat swelling


Respiratory:  Denies: cough, shortness of breath


Cardiovascular:  Denies: chest pain, palpitations


Gastrointestinal:  Reports: abdominal pain, Denies: diarrhea, nausea, vomiting


Musculoskeletal:  Denies: back pain, joint pain


Skin:  Denies: rash


Neurological:  Denies: headache, numbness


Endocrine:  Denies: increased thirst, increased urine


Hematologic/Lymphatic:  Denies: easy bruising


All Other Systems:  negative except mentioned in HPI





Physical Exam





Vital Signs








  Date Time  Temp Pulse Resp B/P Pulse Ox O2 Delivery O2 Flow Rate FiO2


 


2/13/17 02:12 99.0 92 16 173/102 100 Room Air  





vitals with hypertension


Sp02 EP Interpretation:  reviewed, normal


General Appearance:  well appearing, no apparent distress, alert


Head:  normocephalic, atraumatic


Eyes:  bilateral eye EOMI, bilateral eye PERRL


ENT:  hearing grossly normal, normal pharynx


Neck:  full range of motion, supple, no meningismus


Respiratory:  chest non-tender, lungs clear, normal breath sounds


Cardiovascular #1:  regular rate, rhythm, no murmur


Gastrointestinal:  normal bowel sounds, no mass, no organomegaly, no bruit, non-

distended, tenderness - Mild, diffuse


Musculoskeletal:  back normal, gait/station normal, normal range of motion


Psychiatric:  mood/affect normal


Skin:  warm/dry





Medical Decision Making


Diagnostic Impression:  


 Primary Impression:  


 Abdominal pain


 Qualified Codes:  R10.84 - Generalized abdominal pain


 Additional Impressions:  


 Back pain


 Qualified Codes:  M54.9 - Dorsalgia, unspecified


 Substance abuse


 Anemia due to chronic kidney disease


 Hematuria


 Hypertension


 Qualified Codes:  I10 - Essential (primary) hypertension


ER Course


Patient presents with back pain/abdominal pain.  Most likely drug abuse in 

nature.  CT scan unremarkable.  Kidney function improving since discharge.  No 

evidence of acute abdomen.  No evidence of dissection.  No evidence of 

infection.  We'll discharge home.


Lab Results Impression


labs unremarkable


CT/MRI/US Diagnostic Results


CT/MRI/US Diagnostic Results :  


   Imaging Test Ordered:  CT abd and pelvis


   Impression


Read by radiologist.  Unremarkable.





Last Vital Signs








  Date Time  Temp Pulse Resp B/P Pulse Ox O2 Delivery O2 Flow Rate FiO2


 


2/13/17 02:12 99.0 92 16 173/102 100 Room Air  








Status:  improved


Disposition:  HOME, SELF-CARE


Condition:  Stable


Scripts


Acetaminophen* (ACETAMINOPHEN EXTRA STRENGTH*) 500 Mg Tablet


500 MG ORAL Q8H Y for Fever/Headache/Mild Pain, #30 TAB


   Prov: GASPER PERSON M.D.         2/13/17


Patient Instructions:  Back Pain, Adult





Additional Instructions:  


Abstain from drugs and alcohol.  Trachea medication.  Followup with your DrMartha in 

2-3 days.  Return if symptom worsen.











GASPER PERSON M.D. Feb 13, 2017 03:54

## 2017-02-14 NOTE — DIAGNOSTIC IMAGING REPORT
Indication: Abdominal pain



Technique: Spiral acquisitions obtained through the abdomen and pelvis. No oral

contrast utilized, per emergency room physician request No IV contrast utilized,

per referring physician request.. Multiplanar reconstructions were generated. Total

dose length product 586 mGycm. CTDIvol(s) 12 mGy



Comparison: 6/28/2016



Findings: Lack of oral contrast limits assessment of the GI tract. The appendix 

is

normal. No evidence of diverticulosis or diverticulitis. No small bowel distention.

No free or loculated intraperitoneal air or fluid.



Lack of IV contrast limits assessment of the solid organs. The liver is 

somewhat

enlarged. No focal abnormalities. The gallbladder, bile ducts, pancreas, spleen, 

are

unremarkable. No renal or ureteral calculi. No hydronephrosis or hydroureter. 

No

retroperitoneal or mesenteric mass or adenopathy. No pelvic mass or adenopathy. 

The

included lung bases are clear. There is mild degenerative spondylosis



Compared to prior exam, previously described enteritis changes are no longer

evident. The size of the liver has increased.



Impression: Limited exam, due to lack of oral and IV contrast



Previously demonstrated enteritis changes are no longer evident



No acute process currently



Borderline hepatomegaly, increased from 6/28/2016



This agrees with the preliminary interpretation provided overnight by 

[facility]

teleradiology service.



The CT scanner at [facility] is accredited by the [employer] College 

of

Radiology and the scans are performed using protocols designed to limit 

radiation

exposure to as low as reasonably achievable to attain images of sufficient

resolution adequate for diagnostic evaluation.

## 2019-04-16 ENCOUNTER — HOSPITAL ENCOUNTER (EMERGENCY)
Dept: HOSPITAL 72 - EMR | Age: 49
Discharge: LEFT BEFORE DISCHARGE | End: 2019-04-16
Payer: MEDICAID

## 2019-04-16 ENCOUNTER — HOSPITAL ENCOUNTER (EMERGENCY)
Dept: HOSPITAL 72 - EMR | Age: 49
Discharge: HOME | End: 2019-04-16
Payer: COMMERCIAL

## 2019-04-16 VITALS — SYSTOLIC BLOOD PRESSURE: 127 MMHG | DIASTOLIC BLOOD PRESSURE: 82 MMHG

## 2019-04-16 VITALS — WEIGHT: 200 LBS | BODY MASS INDEX: 26.51 KG/M2 | HEIGHT: 73 IN

## 2019-04-16 VITALS — DIASTOLIC BLOOD PRESSURE: 75 MMHG | SYSTOLIC BLOOD PRESSURE: 129 MMHG

## 2019-04-16 VITALS — DIASTOLIC BLOOD PRESSURE: 80 MMHG | SYSTOLIC BLOOD PRESSURE: 145 MMHG

## 2019-04-16 VITALS — BODY MASS INDEX: 26.51 KG/M2 | HEIGHT: 73 IN | WEIGHT: 200 LBS

## 2019-04-16 DIAGNOSIS — Z53.21: Primary | ICD-10-CM

## 2019-04-16 DIAGNOSIS — Z88.2: ICD-10-CM

## 2019-04-16 DIAGNOSIS — N18.9: ICD-10-CM

## 2019-04-16 DIAGNOSIS — L03.115: Primary | ICD-10-CM

## 2019-04-16 DIAGNOSIS — I12.9: ICD-10-CM

## 2019-04-16 LAB
ADD MANUAL DIFF: NO
ALBUMIN SERPL-MCNC: 3.4 G/DL (ref 3.4–5)
ALBUMIN/GLOB SERPL: 0.8 {RATIO} (ref 1–2.7)
ALP SERPL-CCNC: 80 U/L (ref 46–116)
ALT SERPL-CCNC: 18 U/L (ref 12–78)
ANION GAP SERPL CALC-SCNC: 8 MMOL/L (ref 5–15)
AST SERPL-CCNC: 21 U/L (ref 15–37)
BASOPHILS NFR BLD AUTO: 1.2 % (ref 0–2)
BILIRUB SERPL-MCNC: 0.5 MG/DL (ref 0.2–1)
BUN SERPL-MCNC: 13 MG/DL (ref 7–18)
CALCIUM SERPL-MCNC: 8.8 MG/DL (ref 8.5–10.1)
CHLORIDE SERPL-SCNC: 102 MMOL/L (ref 98–107)
CO2 SERPL-SCNC: 30 MMOL/L (ref 21–32)
CREAT SERPL-MCNC: 1.1 MG/DL (ref 0.55–1.3)
EOSINOPHIL NFR BLD AUTO: 1.7 % (ref 0–3)
ERYTHROCYTE [DISTWIDTH] IN BLOOD BY AUTOMATED COUNT: 12.7 % (ref 11.6–14.8)
GLOBULIN SER-MCNC: 4.2 G/DL
HCT VFR BLD CALC: 41.2 % (ref 42–52)
HGB BLD-MCNC: 13 G/DL (ref 14.2–18)
LYMPHOCYTES NFR BLD AUTO: 19.3 % (ref 20–45)
MCV RBC AUTO: 92 FL (ref 80–99)
MONOCYTES NFR BLD AUTO: 8.1 % (ref 1–10)
NEUTROPHILS NFR BLD AUTO: 69.8 % (ref 45–75)
PLATELET # BLD: 260 K/UL (ref 150–450)
POTASSIUM SERPL-SCNC: 3.6 MMOL/L (ref 3.5–5.1)
RBC # BLD AUTO: 4.47 M/UL (ref 4.7–6.1)
SODIUM SERPL-SCNC: 139 MMOL/L (ref 136–145)
WBC # BLD AUTO: 7.2 K/UL (ref 4.8–10.8)

## 2019-04-16 PROCEDURE — 99284 EMERGENCY DEPT VISIT MOD MDM: CPT

## 2019-04-16 PROCEDURE — 80053 COMPREHEN METABOLIC PANEL: CPT

## 2019-04-16 PROCEDURE — 85025 COMPLETE CBC W/AUTO DIFF WBC: CPT

## 2019-04-16 PROCEDURE — 96372 THER/PROPH/DIAG INJ SC/IM: CPT

## 2019-04-16 PROCEDURE — 36415 COLL VENOUS BLD VENIPUNCTURE: CPT

## 2019-04-16 PROCEDURE — 85651 RBC SED RATE NONAUTOMATED: CPT

## 2019-04-16 PROCEDURE — 96374 THER/PROPH/DIAG INJ IV PUSH: CPT

## 2019-04-16 PROCEDURE — 93971 EXTREMITY STUDY: CPT

## 2019-04-16 NOTE — NUR
ED Nurse Note:



Pt decided that he needed his phone charged and decided to leave. Provided a 
phone  but was not the right match. Stated he will come back. Pt left ER 
without being seen by ERMD. Left Er w/ all belongings.

## 2019-04-16 NOTE — NUR
ED Nurse Note:



Pt came into the ER w/ complaints of right lower leg edema and redness since 
yesterday morning. Pt denies having trauma. Pt is rating the pain 10/10 in that 
area. Non radiating. Redness, warmth, and edema noted on the site. Pt states 
that he had the same symptoms 2 years ago and was admitted in the hospital and 
received dialysis. Pt is A + O x4. Ambulatory. Skin warm to touch.

## 2019-04-16 NOTE — NUR
ED Nurse Note:







pt cleared to be d/c per ERMD, pt discharge and aftercare instruction w/ 
prescription provided, pt education done via discussion and handout, pt advised 
to follow up with pcp or return to ed if sx worsen or new sx develop, pt 
verbalized understanding and agrees with plan, vss, ambulatory w/ steady gait, 
iv d/c and ID band removed, pt was provided w/ list of clinic to follow up and 
shelter for reference, pt refused to give address, states he has place to go, 
pt refused to go to the shelter. pt given sandwich and water.

## 2019-04-16 NOTE — NUR
ED Nurse Note:pt. came with right lower leg cellulitis, VSS, venous dupler 
study was done and blood sent to labs ,

## 2019-04-16 NOTE — DIAGNOSTIC IMAGING REPORT
Indication: Right leg edema and pain

 

Technique: Grayscale and duplex images of the right lower extremity veins

 

Comparison: none

 

Findings: Grayscale duplex images demonstrate no evidence of intraluminal thrombus.

Normal phasic Doppler waveforms, demonstrating normal augmentation response and no

evidence of valvular insufficiency. Normal compressibility of all deep venous

structures.

 

Impression: Negative for evidence of right lower extremity deep venous thrombosis

## 2019-04-16 NOTE — EMERGENCY ROOM REPORT
History of Present Illness


General


Chief Complaint:  Edema





Present Illness


HPI


48-year-old male presents with right lower extremity swelling that began 3 days 

ago.  The patient states that he has had this happen one year ago and was told 

it was cellulitis.  Patient states that he has no provoking factors.  Denies 

any recent trauma or drug use.    States he has a history of acute kidney 

injury with dialysis which has since resolved.  Nothing any medications at this 

time.  Denies any recent travel, diabetes, chest pain, shortness of breath, 

bleeding disorders, headache, abdominal pain, nausea, numbness, or paralysis.


Allergies:  


Coded Allergies:  


     SULFA (SULFONAMIDE ANTIBIOTICS) (Verified  Allergy, Unknown, 2/13/17)


     SULFUR (Unverified  Allergy, Unknown, unk, 2/13/17)


 Per patient allergic to sulfur





Patient History


Past Medical History:  see triage record


Past Surgical History:  none


Pertinent Family History:  none


Reviewed Nursing Documentation:  PMH: Agreed; PSxH: Agreed





Nursing Documentation-PMH


Hx Hypertension:  Yes - ANEMIA


Hx Gastrointestinal Problems:  Yes - KIDNEY FAILURE





Review of Systems


All Other Systems:  negative except mentioned in HPI





Physical Exam





Vital Signs








  Date Time  Temp Pulse Resp B/P (MAP) Pulse Ox O2 Delivery O2 Flow Rate FiO2


 


4/16/19 13:21 98.6 90 16 127/82 95 Room Air  








Sp02 EP Interpretation:  reviewed, normal


General Appearance:  no apparent distress, alert, GCS 15, non-toxic


Head:  normocephalic, atraumatic


Eyes:  bilateral eye normal inspection, bilateral eye PERRL


ENT:  hearing grossly normal, normal pharynx, no angioedema, normal voice


Neck:  full range of motion, supple/symm/no masses


Respiratory:  chest non-tender, lungs clear, normal breath sounds, speaking 

full sentences


Cardiovascular #1:  regular rate, rhythm, no edema


Musculoskeletal:  back normal, gait/station normal, normal range of motion, 

inflammation - right distal tibia, swelling - right distal tibia, tender - 

right distal tibia


Neurologic:  alert, oriented x3, responsive, motor strength/tone normal, 

sensory intact, speech normal


Psychiatric:  judgement/insight normal, memory normal, mood/affect normal, no 

suicidal/homicidal ideation


Skin:  normal color, warm/dry, well hydrated


Lymphatic:  no adenopathy





Medical Decision Making


PA Attestation


Dr. Subramanian my supervising physician with whom patient management has been 

discussed with.


Diagnostic Impression:  


 Primary Impression:  


 Cellulitis


 Qualified Codes:  L03.115 - Cellulitis of right lower limb


ER Course


48-year-old male presents with right lower extremity swelling and redness for 3 

days.  On exam it is swollen, erythematous, tender to touch.  Exam was 

performed which was negative for DVT.  Labs were ordered which showed no 

leukocytosis, anemia or signs of sepsis.  His renal function appears to be 

intact.  She has no significant lateral abnormalities.  Patient was given a 

gram of Rocephin and will go home with prescription for clindamycin for his 

cellulitis.  Patient was advised to return within 2 to 3 days for recheck of 

his right lower extremity swelling.  Patient appears hemodynamically stable 

with no signs of emergent pathology.  Differential diagnosis includes but 

limited to cellulitis, DVT, CHF, venous sufficiency.


CT/MRI/US Diagnostic Results


CT/MRI/US Diagnostic Results :  


   Imaging Test Ordered:  Venous US RLE


   Impression


No DVT, Possible lymph noted noted in right groin.





Last Vital Signs








  Date Time  Temp Pulse Resp B/P (MAP) Pulse Ox O2 Delivery O2 Flow Rate FiO2


 


4/16/19 13:21 98.6 90 16 127/82 95 Room Air  








Scripts


Clindamycin Hcl (CLINDAMYCIN HCL) 300 Mg Capsule


300 MG ORAL TID for 10 Days, #30 CAP


   Prov: Nish Corrales         4/16/19


Patient Instructions:  Cellulitis





Additional Instructions:  


Take medication as directed. Patient instructed to take ibuprofen and tylenol 

as needed for pain. Patient to return for wound check in 2-3 days either here, 

urgent care or with PCP. Advised patient to keep site of infection elevated 

above the level of their heart 3 or 4 times a day, for 30 minutes each time to 

help reduce swelling. Patient is to keep the infected area clean and dry. They 

can take a shower or bath, but be sure to pat the area dry with a towel 

afterward. Patient instructed to not put any antibiotic ointments or creams on 

the area. Patient should come back sooner if their symptoms do not get better 

within 3 days of starting treatment or if the red area gets bigger, more swollen

, or more painful.











Nish Corrales Apr 16, 2019 13:51

## 2019-04-17 NOTE — EMERGENCY ROOM REPORT
Physical Exam





Vital Signs








  Date Time  Temp Pulse Resp B/P (MAP) Pulse Ox O2 Delivery O2 Flow Rate FiO2


 


4/16/19 10:35 98.4 97 16 143/88 96 Room Air  


 


4/16/19 10:46        98











Medical Decision Making


Diagnostic Impression:  


 Primary Impression:  


 Patient left without being seen


ER Course


Patient left without being seen.





Last Vital Signs








  Date Time  Temp Pulse Resp B/P (MAP) Pulse Ox O2 Delivery O2 Flow Rate FiO2


 


4/16/19 10:58 98.3 95 22 145/80 98 Room Air  98








Disposition:  ELOPED


Condition:  Unknown


Referrals:  


NOT CHOSEN [facility]/MD,REFERRING (PCP)











Preeti Subramanian DO Apr 17, 2019 08:49

## 2019-09-20 ENCOUNTER — HOSPITAL ENCOUNTER (EMERGENCY)
Dept: HOSPITAL 72 - EMR | Age: 49
Discharge: HOME | End: 2019-09-20
Payer: COMMERCIAL

## 2019-09-20 VITALS — SYSTOLIC BLOOD PRESSURE: 136 MMHG | DIASTOLIC BLOOD PRESSURE: 75 MMHG

## 2019-09-20 VITALS — WEIGHT: 200 LBS | BODY MASS INDEX: 26.51 KG/M2 | HEIGHT: 73 IN

## 2019-09-20 VITALS — SYSTOLIC BLOOD PRESSURE: 127 MMHG | DIASTOLIC BLOOD PRESSURE: 80 MMHG

## 2019-09-20 DIAGNOSIS — N34.2: Primary | ICD-10-CM

## 2019-09-20 DIAGNOSIS — Z88.2: ICD-10-CM

## 2019-09-20 PROCEDURE — 99283 EMERGENCY DEPT VISIT LOW MDM: CPT

## 2019-09-20 PROCEDURE — 96372 THER/PROPH/DIAG INJ SC/IM: CPT

## 2019-09-20 NOTE — NUR
ER DISCHARGE NOTE:



Pt was seen due to penile greenish discharge. Patient is cleared to be 
discharged per ERMD, pt is aox4, on room air, with stable vital signs. pt was 
given dc instructions, pt was able to verbalize understanding, pt id band 
removed. pt is able to ambulate with steady gait. pt took all belongings.

## 2019-09-20 NOTE — EMERGENCY ROOM REPORT
History of Present Illness


General


Chief Complaint:  Male Urogenital Problems


Source:  Patient





Present Illness


HPI


This is a 48-year-old male with a history of gonorrhea in the past.  He 

presents with chief complaint of penile discharge.  Onset for about a week.  

Greenish in color.  Has dysuria.  No fever chills but no nausea no vomiting.  

He is sexually active with one partner but unprotected sex.  No joint pain.  No 

other complaint.


Allergies:  


Coded Allergies:  


     SULFA (SULFONAMIDE ANTIBIOTICS) (Verified  Allergy, Unknown, 2/13/17)


     SULFUR (Unverified  Allergy, Unknown, unk, 2/13/17)


 Per patient allergic to sulfur





Patient History


Past Medical History:  see triage record, old chart reviewed


Past Surgical History:  none


Pertinent Family History:  none


Social History:  Denies: smoking


Immunizations:  other


Reviewed Nursing Documentation:  PMH: Agreed; PSxH: Agreed





Nursing Documentation-PMH


Past Medical History:  No History, Except For


Hx Hypertension:  Yes - ANEMIA


Hx Gastrointestinal Problems:  Yes - Kidney Failure





Review of Systems


Eye:  Denies: eye pain, blurred vision


ENT:  Denies: ear pain, nose congestion, throat swelling


Respiratory:  Denies: cough, shortness of breath


Cardiovascular:  Denies: chest pain, palpitations


Gastrointestinal:  Denies: abdominal pain, diarrhea, nausea, vomiting


Genitourinary:  Reports: discharge


Musculoskeletal:  Denies: back pain, joint pain


Skin:  Denies: rash


Neurological:  Denies: headache, numbness


Endocrine:  Denies: increased thirst, increased urine


Hematologic/Lymphatic:  Denies: easy bruising


All Other Systems:  negative except mentioned in HPI





Physical Exam





Vital Signs








  Date Time  Temp Pulse Resp B/P (MAP) Pulse Ox O2 Delivery O2 Flow Rate FiO2


 


9/20/19 20:53 97.5 102 18 136/75 98 Room Air  





Vitals normal


Sp02 EP Interpretation:  reviewed, normal


General Appearance:  well appearing, no apparent distress, alert


Head:  normocephalic, atraumatic


Eyes:  bilateral eye PERRL, bilateral eye EOMI


ENT:  hearing grossly normal, normal pharynx


Neck:  full range of motion, supple, no meningismus


Respiratory:  chest non-tender, lungs clear, normal breath sounds


Cardiovascular #1:  regular rate, rhythm, no murmur


Gastrointestinal:  normal bowel sounds, non tender, no mass, no organomegaly, 

no bruit, non-distended


Genitourinary:  penis normal, other - Greenish discharge


Musculoskeletal:  back normal, gait/station normal, normal range of motion


Psychiatric:  mood/affect normal





Medical Decision Making


Diagnostic Impression:  


 Primary Impression:  


 Urethritis


ER Course


Presents with urethritis.  Most likely gonorrhea.  Also treated for chlamydia.  

Recommend outpatient testing for HIV, hepatitis, syphilis and other STDs.  Told 

patient to have partner treated also.





Last Vital Signs








  Date Time  Temp Pulse Resp B/P (MAP) Pulse Ox O2 Delivery O2 Flow Rate FiO2


 


9/20/19 20:53 97.5 102 18 136/75 (95) 98 Room Air  








Status:  improved


Disposition:  HOME, SELF-CARE


Condition:  Stable


Patient Instructions:  Urethritis, Adult





Additional Instructions:  


Have your partner treated.  Recommend outpatient testing for HIV, hepatitis, 

syphilis and other STDs.  This can be done anonymously.  Follow-up with your 

doctor in 7 days.  Return if symptoms worsen.











Jesus Levy MD Sep 20, 2019 21:13